# Patient Record
Sex: MALE | Race: WHITE | Employment: OTHER | URBAN - METROPOLITAN AREA
[De-identification: names, ages, dates, MRNs, and addresses within clinical notes are randomized per-mention and may not be internally consistent; named-entity substitution may affect disease eponyms.]

---

## 2017-01-05 DIAGNOSIS — F03.90 DEMENTIA WITHOUT BEHAVIORAL DISTURBANCE, UNSPECIFIED DEMENTIA TYPE: Primary | ICD-10-CM

## 2017-01-05 RX ORDER — DONEPEZIL HYDROCHLORIDE 5 MG/1
5 TABLET, FILM COATED ORAL AT BEDTIME
Qty: 90 TABLET | Refills: 0 | Status: SHIPPED | OUTPATIENT
Start: 2017-01-05 | End: 2017-05-02

## 2017-01-05 NOTE — TELEPHONE ENCOUNTER
Fax from pharmacy.  They have Rx for donepezil given 12/26/16 #0 with 1 refill.  They are wondering if you can resend as a 90 day supply?  I started reorder.

## 2017-01-08 PROBLEM — F03.90 DEMENTIA WITHOUT BEHAVIORAL DISTURBANCE, UNSPECIFIED DEMENTIA TYPE: Status: ACTIVE | Noted: 2017-01-08

## 2017-01-27 ENCOUNTER — TELEPHONE (OUTPATIENT)
Dept: FAMILY MEDICINE | Facility: OTHER | Age: 72
End: 2017-01-27

## 2017-01-27 NOTE — TELEPHONE ENCOUNTER
Duane Tegg is a 71 year old male who-   NURSING ASSESSMENT:  Description:  Daughter-in-law calls. Consent on file to speak with her. She is concerned because her dad's Bp this am was 101/62.  His bp medication was increased this past Dec. She is not with him as he is in [place] at this time. She is also concerned because he has been having increased memory confusion. She does not believe that he is taking extra of his medications because a neighbor is checking on him and setting up his medication. She is unable to answer questions regarding associated symptoms at this time.  Advised daughter-in-law to have him get a bp machine at home to start checking bp's daily and logging them. If his SBP drops below 90 or he has symptoms of lightheadedness, weakness, faint then he should seek emergency care. Otherwise he should find a provider locally to see in the mean time for bp follow up since he is not due back to [place] until April or May. She will contact patient to see how he is doing.   Precip. factors: unknown   Associated symptoms:  unknown  Pain scale (0-10)  Unable to rate  Last exam/Treatment:  12/26/2016  Allergies: No Known Allergies  NURSING PLAN: Nursing advice to patient See above     RECOMMENDED DISPOSITION:  Home care advice   Will comply with recommendation: Yes  If further questions/concerns or if symptoms do not improve, worsen or new symptoms develop, call your PCP or [facility] as soon as possible.      Guideline used: [employer]  Telephone Triage Protocols for Nurses, Fourth Edition, Myara Miranda RN

## 2017-01-27 NOTE — TELEPHONE ENCOUNTER
Reason for call:  Patient reporting a symptom     Symptom or request: Blood pressure 101 over 62 about 2 days ago, Daughter thinks that his blood pressure is to low & he does have a cold right now.    Duration (how long have symptoms been present): N/A    Have you been treated for this before? Yes    Additional comments: Daughter wants to know if her dads bp is ok or if she should be concerned. Also she stated that the Dr increased in his dosage on medication for BP.    Phone Number patient can be reached at:  Other phone number: Daughters phone#  [phone]    Best Time:  Anytime    Can we leave a detailed message on this number:  YES    Call taken on 1/27/2017 at 4:32 PM by Leidy Dias

## 2017-01-31 ENCOUNTER — TELEPHONE (OUTPATIENT)
Dept: FAMILY MEDICINE | Facility: OTHER | Age: 72
End: 2017-01-31

## 2017-01-31 DIAGNOSIS — I10 ESSENTIAL HYPERTENSION: Primary | ICD-10-CM

## 2017-01-31 NOTE — TELEPHONE ENCOUNTER
bp machine was order in 2015 but never picked up. On the AVS it looked like a new Rx which was confusing but one has not been ordered since 2015. Since pt never picked it up, could you order a new one to the [facility] [address] [place], [place] - [address] AT [address] OF [place] [address]  Since he is in [place] until end of March at least. With the new changes in medications he and his family would like him to check it daily at home instead of driving somewhere to check it as he is starting to have memory problems    Routing to  for review and order if appropriate.

## 2017-01-31 NOTE — TELEPHONE ENCOUNTER
Reason for call:  Other  Reason for Call: Request for an order or referral:    Order or referral being requested: blood pressure machine    Date needed: as soon as possible    Has the patient been seen by the PCP for this problem? YES    Additional comments: is wondering where this was sent to?     Phone number Patient can be reached at:  Other phone number:  Fatimah ovalle [phone]    Best Time:  any    Can we leave a detailed message on this number?  YES    Call taken on 1/31/2017 at 3:24 PM by Ladan Kim

## 2017-02-09 ENCOUNTER — TRANSFERRED RECORDS (OUTPATIENT)
Dept: HEALTH INFORMATION MANAGEMENT | Facility: CLINIC | Age: 72
End: 2017-02-09

## 2017-02-17 ENCOUNTER — TELEPHONE (OUTPATIENT)
Dept: FAMILY MEDICINE | Facility: OTHER | Age: 72
End: 2017-02-17

## 2017-02-17 NOTE — TELEPHONE ENCOUNTER
Summary:    Patient is due/failing the following:   BP CHECK    Action needed:   Patient needs nurse only appointment.    Type of outreach:    Phone, spoke to patient.  patient is now in [place] and doesnt know when or if he is coming back     Questions for provider review:    None                                                                                                                                    Sylwia Christianson       Chart routed to Care Team .        Panel Management Review      Patient has the following on his problem list:     Hypertension   Last three blood pressure readings:  BP Readings from Last 3 Encounters:   12/26/16 (!) 154/92   01/06/16 (!) 140/94   12/24/15 146/80     Blood pressure: Failed    HTN Guidelines:  Age 18-59 BP range:  Less than 140/90  Age 60-85 with Diabetes:  Less than 140/90  Age 60-85 without Diabetes:  less than 150/90      Composite cancer screening  Chart review shows that this patient is due/due soon for the following None

## 2017-03-10 ENCOUNTER — TRANSFERRED RECORDS (OUTPATIENT)
Dept: HEALTH INFORMATION MANAGEMENT | Facility: CLINIC | Age: 72
End: 2017-03-10

## 2017-03-10 LAB
ALT SERPL-CCNC: 29 U/L (ref 5–49)
AST SERPL-CCNC: 24 U/L (ref 9–48)
CHOLEST SERPL-MCNC: 131 MG/DL
CREAT SERPL-MCNC: 0.9 MG/DL (ref 0.4–1.4)
GFR SERPL CREATININE-BSD FRML MDRD: >60 ML/MIN/1.73M2
GLUCOSE SERPL-MCNC: 94 MG/DL (ref 65–100)
HDLC SERPL-MCNC: 40 MG/DL (ref 40–50)
HEP C HIM: NORMAL
POTASSIUM SERPL-SCNC: 4.6 MMOL/L (ref 3.6–5.3)
TRIGL SERPL-MCNC: 130 MG/DL (ref 35–150)

## 2017-05-02 DIAGNOSIS — F03.90 DEMENTIA WITHOUT BEHAVIORAL DISTURBANCE, UNSPECIFIED DEMENTIA TYPE: ICD-10-CM

## 2017-05-05 RX ORDER — DONEPEZIL HYDROCHLORIDE 5 MG/1
TABLET, FILM COATED ORAL
Qty: 90 TABLET | Refills: 0 | Status: SHIPPED | OUTPATIENT
Start: 2017-05-05 | End: 2017-10-02

## 2017-05-05 NOTE — TELEPHONE ENCOUNTER
Prescription approved per [facility] Refill Protocol.  Patient due for OV in June.    Destiny Osborn, RN, BSN

## 2017-05-08 ENCOUNTER — TRANSFERRED RECORDS (OUTPATIENT)
Dept: HEALTH INFORMATION MANAGEMENT | Facility: CLINIC | Age: 72
End: 2017-05-08

## 2017-09-20 NOTE — PROGRESS NOTES
SUBJECTIVE:                                                    Duane Tegg is a 72 year old male who presents to clinic today for the following health issues:      HPI    Back Pain       Duration: x few years        Specific cause: none    Description:   Location of pain: low back bilateral  Character of pain: stabbing and intermittent  Pain radiation:radiates into the left buttocks  New numbness or weakness in legs, not attributed to pain:  no     Intensity: At its worst 8/10    History:   Pain interferes with job: Not applicable  History of back problems: no prior back problems  Any previous MRI or X-rays: None  Sees a specialist for back pain:  No  Therapies tried without relief: Steroid and Physical Therapy    Alleviating factors:   Improved by: Steroid, only for a bit      Precipitating factors:  Worsened by: Standing and Sitting    Functional and Psychosocial Screen (Melody STarT Back):      -          Accompanying Signs & Symptoms:  Risk of Fracture:  None  Risk of Cauda Equina:  None  Risk of Infection:  None  Risk of Cancer:  None  Risk of Ankylosing Spondylitis:  Onset at age <35, male, AND morning back stiffness. no           Problem list and histories reviewed & adjusted, as indicated.  Additional history: as documented        Patient Active Problem List   Diagnosis     Right bundle branch block     Essential hypertension     Mild cognitive impairment     Dementia without behavioral disturbance, unspecified dementia type     Past Surgical History:   Procedure Laterality Date     COLONOSCOPY N/A 1/6/2016    Procedure: COMBINED COLONOSCOPY, SINGLE OR MULTIPLE BIOPSY/POLYPECTOMY BY BIOPSY;  Surgeon: Ryan Nicholas MD;  Location:  GI       Social History   Substance Use Topics     Smoking status: Former Smoker     Smokeless tobacco: Not on file     Alcohol use Yes     Family History   Problem Relation Age of Onset     DIABETES Father          Current Outpatient Prescriptions   Medication Sig Dispense  "Refill     predniSONE (DELTASONE) 20 MG tablet Take 2 tablets (40 mg) by mouth daily for 5 days 10 tablet 0     losartan (COZAAR) 100 MG tablet TAKE 1 TABLET(100 MG) BY MOUTH DAILY 90 tablet 0     donepezil (ARICEPT) 5 MG tablet TAKE 1 TABLET(5 MG) BY MOUTH AT BEDTIME 90 tablet 0     atorvastatin (LIPITOR) 20 MG tablet Take 1 tablet (20 mg) by mouth daily 90 tablet 3     aspirin 81 MG tablet Take 1 tablet (81 mg) by mouth daily 90 tablet 3     order for DME Equipment being ordered: blood pressure machine and cuff (Patient not taking: Reported on 9/21/2017) 1 each 0     No Known Allergies  BP Readings from Last 3 Encounters:   09/21/17 112/74   12/26/16 (!) 154/92   01/06/16 (!) 140/94    Wt Readings from Last 3 Encounters:   09/21/17 197 lb (89.4 kg)   12/26/16 204 lb (92.5 kg)   12/24/15 204 lb (92.5 kg)                  Labs reviewed in EPIC        ROS:  Constitutional, HEENT, cardiovascular, pulmonary, GI, , musculoskeletal, neuro, skin, endocrine and psych systems are negative, except as otherwise noted.      OBJECTIVE:   /74  Pulse 84  Temp 97.6  F (36.4  C) (Oral)  Resp 16  Ht 5' 7.25\" (1.708 m)  Wt 197 lb (89.4 kg)  BMI 30.63 kg/m2  Body mass index is 30.63 kg/(m^2).   Physical Exam   Constitutional: He is oriented to person, place, and time. He appears well-developed and well-nourished.   HENT:   Head: Normocephalic and atraumatic.   Musculoskeletal:   Milldy reduced strength on the left compared to the right. Normal reflexes, sensations. Positive straight leg rise   Neurological: He is alert and oriented to person, place, and time.   Psychiatric: He has a normal mood and affect.         Diagnostic Test Results:  none     ASSESSMENT/PLAN:     Problem List Items Addressed This Visit     None      Visit Diagnoses     Lumbar radiculopathy    -  Primary    Relevant Medications    predniSONE (DELTASONE) 20 MG tablet    Other Relevant Orders    XR Lumbar Spine 2/3 Views    [facility] PT, HAND, AND " CHIROPRACTIC REFERRAL    Chronic left-sided low back pain with left-sided sciatica        Relevant Medications    predniSONE (DELTASONE) 20 MG tablet    Other Relevant Orders    [facility] PT, HAND, AND CHIROPRACTIC REFERRAL         Symptoms consistent with radiculopathy   Trial prednisone  Get baseline X-ray   Trial therapy   Follow results of X-ray closely  If no response to conservative management - will get MRI for further eval  Discussed home care  Reportable signs and symptoms discussed  RTC if symptoms persist or fail to improve    Heike Antonio MD  [facility]

## 2017-09-21 ENCOUNTER — OFFICE VISIT (OUTPATIENT)
Dept: FAMILY MEDICINE | Facility: OTHER | Age: 72
End: 2017-09-21
Payer: COMMERCIAL

## 2017-09-21 ENCOUNTER — RADIANT APPOINTMENT (OUTPATIENT)
Dept: GENERAL RADIOLOGY | Facility: OTHER | Age: 72
End: 2017-09-21
Attending: FAMILY MEDICINE
Payer: COMMERCIAL

## 2017-09-21 VITALS
HEART RATE: 84 BPM | RESPIRATION RATE: 16 BRPM | WEIGHT: 197 LBS | SYSTOLIC BLOOD PRESSURE: 112 MMHG | HEIGHT: 67 IN | DIASTOLIC BLOOD PRESSURE: 74 MMHG | BODY MASS INDEX: 30.92 KG/M2 | TEMPERATURE: 97.6 F

## 2017-09-21 DIAGNOSIS — Z23 NEED FOR PROPHYLACTIC VACCINATION AND INOCULATION AGAINST INFLUENZA: ICD-10-CM

## 2017-09-21 DIAGNOSIS — G89.29 CHRONIC LEFT-SIDED LOW BACK PAIN WITH LEFT-SIDED SCIATICA: ICD-10-CM

## 2017-09-21 DIAGNOSIS — M54.42 CHRONIC LEFT-SIDED LOW BACK PAIN WITH LEFT-SIDED SCIATICA: ICD-10-CM

## 2017-09-21 DIAGNOSIS — M54.16 LUMBAR RADICULOPATHY: Primary | ICD-10-CM

## 2017-09-21 DIAGNOSIS — M54.16 LUMBAR RADICULOPATHY: ICD-10-CM

## 2017-09-21 PROCEDURE — 90662 IIV NO PRSV INCREASED AG IM: CPT | Performed by: FAMILY MEDICINE

## 2017-09-21 PROCEDURE — 72100 X-RAY EXAM L-S SPINE 2/3 VWS: CPT

## 2017-09-21 PROCEDURE — 99214 OFFICE O/P EST MOD 30 MIN: CPT | Mod: 25 | Performed by: FAMILY MEDICINE

## 2017-09-21 PROCEDURE — G0008 ADMIN INFLUENZA VIRUS VAC: HCPCS | Performed by: FAMILY MEDICINE

## 2017-09-21 RX ORDER — PREDNISONE 20 MG/1
40 TABLET ORAL DAILY
Qty: 10 TABLET | Refills: 0 | Status: SHIPPED | OUTPATIENT
Start: 2017-09-21 | End: 2017-09-26

## 2017-09-21 ASSESSMENT — PAIN SCALES - GENERAL: PAINLEVEL: NO PAIN (0)

## 2017-09-21 NOTE — PROGRESS NOTES
Injectable Influenza Immunization Documentation    1.  Is the person to be vaccinated sick today?   No    2. Does the person to be vaccinated have an allergy to a component   of the vaccine?   No    3. Has the person to be vaccinated ever had a serious reaction   to influenza vaccine in the past?   No    4. Has the person to be vaccinated ever had Guillain-Barré syndrome?   No    Form completed by Leigh Tillman MA

## 2017-09-21 NOTE — MR AVS SNAPSHOT
After Visit Summary   9/21/2017    Duane Tegg    MRN: [id number]           Patient Information     Date Of Birth          1945        Visit Information        Provider Department      9/21/2017 5:40 PM Heike Antonio MD [facility]        Today's Diagnoses     Lumbar radiculopathy    -  1    Chronic left-sided low back pain with left-sided sciatica           Follow-ups after your visit        Additional Services     SH[facility] PT, HAND, AND CHIROPRACTIC REFERRAL       **This order will print in the [facility] Scheduling Office**    Physical Therapy, Hand Therapy and Chiropractic Care are available through:    *[facility]  *[facility]  *[facility]    Call one number to schedule at any of the above locations: [phone].    Your provider has referred you to: Physical Therapy at [facility] or [facility]    Indication/Reason for Referral: Low Back Pain  Onset of Illness:   Therapy Orders: Evaluate and Treat  Special Programs: None  Special Request: Manual Therapy: Myofascial Release/Massage  None    Melody Oliver      Additional Comments for the Therapist or Chiropractor: .    Please be aware that coverage of these services is subject to the terms and limitations of your health insurance plan.  Call member services at your health plan with any benefit or coverage questions.      Please bring the following to your appointment:    *Your personal calendar for scheduling future appointments  *Comfortable clothing                  Follow-up notes from your care team     Return in about 4 weeks (around 10/19/2017).      Future tests that were ordered for you today     Open Future Orders        Priority Expected Expires Ordered    XR Lumbar Spine 2/3 Views Routine 9/21/2017 9/21/2018 9/21/2017            Who to contact     If you have questions or need follow up information about today's clinic visit or your schedule please contact [facility] directly at [phone].  Normal or non-critical lab and imaging results will be communicated to you by TigerTradehart, letter or phone within 4 business days after the clinic has received the results. If you do not hear from us within 7 days, please contact the clinic through [employer]Tradehart or phone. If you have a critical or abnormal lab result, we will notify you by phone as soon as possible.  Submit refill requests through [employer] or call your pharmacy and they will forward the refill request to us. Please allow 3 business days for your refill to be completed.          Additional Information About Your Visit        TigerTradeharSnappyTV Information     [employer] gives you secure access to your electronic health record. If you see a primary care provider, you can also send messages to your care team and make appointments. If you have questions, please call your primary care clinic.  If you do not have a primary care provider, please call [phone] and they will assist you.        Care EveryWhere ID     This is your Care EveryWhere ID. This could be used by other organizations to access your [facility] medical records  [id number]        Your Vitals Were     Pulse Temperature Respirations Height BMI (Body Mass Index)       84 97.6  F (36.4  C) (Oral) 16 5' 7.25\" (1.708 m) 30.63 kg/m2        Blood Pressure from Last 3 Encounters:   09/21/17 112/74   12/26/16 (!) 154/92   01/06/16 (!) 140/94    Weight from Last 3 Encounters:   09/21/17 197 lb (89.4 kg)   12/26/16 204 lb (92.5 kg)   12/24/15 204 lb (92.5 kg)              We Performed the Following     [facility] PT, HAND, AND CHIROPRACTIC REFERRAL          Today's Medication Changes          These changes are accurate as of: 9/21/17  6:36 PM.  If you have any questions, ask your nurse or doctor.               Start taking these medicines.        Dose/Directions    predniSONE 20 MG tablet   Commonly known as:  DELTASONE   Used for:  Lumbar radiculopathy   Started by:  Heike Antonio MD        " Dose:  40 mg   Take 2 tablets (40 mg) by mouth daily for 5 days   Quantity:  10 tablet   Refills:  0            Where to get your medicines      These medications were sent to [facility] - [place], [place] - [address] [facility] AT [facility] [address]  [address], [facility] [address]     Phone:  [phone]     predniSONE 20 MG tablet                Primary Care Provider    Physician No Ref-Primary       No address on file        Equal Access to Services     MARCELLA SY AH: Hadii aad ku hadasho Soomaali, waaxda luqadaha, qaybta kaalmada adeegyada, waxay idiin hayaan nader grace ladanielle moise. So [facility] [phone].    ATENCIÓN: Si habla español, tiene a jorge disposición servicios gratuitos de asistencia lingüística. [facility] [phone].    We comply with applicable federal civil rights laws and [place] laws. We do not discriminate on the basis of race, color, national origin, age, disability sex, sexual orientation or gender identity.            Thank you!     Thank you for choosing [facility]  for your care. Our goal is always to provide you with excellent care. Hearing back from our patients is one way we can continue to improve our services. Please take a few minutes to complete the written survey that you may receive in the mail after your visit with us. Thank you!             Your Updated Medication List - Protect others around you: Learn how to safely use, store and throw away your medicines at www.disposemymeds.org.          This list is accurate as of: 9/21/17  6:36 PM.  Always use your most recent med list.                   Brand Name Dispense Instructions for use Diagnosis    aspirin 81 MG tablet     90 tablet    Take 1 tablet (81 mg) by mouth daily    Benign essential hypertension       atorvastatin 20 MG tablet    LIPITOR    90 tablet    Take 1 tablet (20 mg) by mouth daily    Hyperlipidemia LDL goal <100       donepezil 5 MG tablet    ARIcept    90 tablet    TAKE  1 TABLET(5 MG) BY MOUTH AT BEDTIME    Dementia without behavioral disturbance, unspecified dementia type       losartan 100 MG tablet    COZAAR    90 tablet    TAKE 1 TABLET(100 MG) BY MOUTH DAILY    Benign essential hypertension       order for DME     1 each    Equipment being ordered: blood pressure machine and cuff    Essential hypertension       predniSONE 20 MG tablet    DELTASONE    10 tablet    Take 2 tablets (40 mg) by mouth daily for 5 days    Lumbar radiculopathy

## 2017-09-21 NOTE — NURSING NOTE
"Chief Complaint   Patient presents with     Back Pain     Panel Management     height, flu, tdap, honoring choices        Initial /74  Pulse 84  Temp 97.6  F (36.4  C) (Oral)  Resp 16  Ht 5' 7.25\" (1.708 m)  Wt 197 lb (89.4 kg)  BMI 30.63 kg/m2 Estimated body mass index is 30.63 kg/(m^2) as calculated from the following:    Height as of this encounter: 5' 7.25\" (1.708 m).    Weight as of this encounter: 197 lb (89.4 kg).  Medication Reconciliation: complete  \Leigh Tillman MA    "

## 2017-09-25 ENCOUNTER — TELEPHONE (OUTPATIENT)
Dept: FAMILY MEDICINE | Facility: OTHER | Age: 72
End: 2017-09-25

## 2017-09-25 NOTE — TELEPHONE ENCOUNTER
----- Message from Heike Antonio MD sent at 9/22/2017  5:03 PM CDT -----  Please inform pt that the X-ray of the back showed severe arthritis and degenerative changes which could be causing the pain. I would recommend therapy for 4 wks. If no improvement , I will get MRI for further eval

## 2017-09-25 NOTE — TELEPHONE ENCOUNTER
Contacted pt and let him know of below.  Will follow up with PT and MRI if not improving.  Bel Lyle, CMA

## 2017-09-25 NOTE — TELEPHONE ENCOUNTER
Spoke to patient and informed him of message below. He is scheduled for PT tomorrow and will let us know after 4 weeks how he is doing.  He said that the prednisone seems to be helping and say's it's only for 5 days. Wondering if he could get a refill on that. I told him that I would check with you.    prednisone      Last Written Prescription Date:  09/21/17  Last Fill Quantity: 10,   # refills: 0  Last Office Visit with [facility] or [facility] prescribing provider: 09/21/17  Future Office visit:       Routing refill request to provider for review/approval because:  Drug not on the [facility], [facility] or [facility] refill protocol or controlled substance

## 2017-09-26 ENCOUNTER — THERAPY VISIT (OUTPATIENT)
Dept: PHYSICAL THERAPY | Facility: CLINIC | Age: 72
End: 2017-09-26
Payer: MEDICARE

## 2017-09-26 DIAGNOSIS — G89.29 CHRONIC BILATERAL LOW BACK PAIN WITH LEFT-SIDED SCIATICA: Primary | Chronic | ICD-10-CM

## 2017-09-26 DIAGNOSIS — M53.3 SI (SACROILIAC) JOINT DYSFUNCTION: ICD-10-CM

## 2017-09-26 DIAGNOSIS — M54.42 CHRONIC BILATERAL LOW BACK PAIN WITH LEFT-SIDED SCIATICA: Primary | Chronic | ICD-10-CM

## 2017-09-26 PROCEDURE — 97161 PT EVAL LOW COMPLEX 20 MIN: CPT | Mod: GP | Performed by: PHYSICAL THERAPIST

## 2017-09-26 PROCEDURE — 97140 MANUAL THERAPY 1/> REGIONS: CPT | Mod: GP | Performed by: PHYSICAL THERAPIST

## 2017-09-26 PROCEDURE — G8979 MOBILITY GOAL STATUS: HCPCS | Mod: GP | Performed by: PHYSICAL THERAPIST

## 2017-09-26 PROCEDURE — G8978 MOBILITY CURRENT STATUS: HCPCS | Mod: GP | Performed by: PHYSICAL THERAPIST

## 2017-09-26 NOTE — PROGRESS NOTES
[facility] Initial Evaluation      Subjective:    Patient is a 72 year old male presenting with rehab back hpi. The history is provided by the patient. No  was used.   Duane Tegg is a 72 year old male with a lumbar condition.  Condition occurred with:  Insidious onset.  Condition occurred: for unknown reasons.  This is a chronic condition  Pt presets to PT today with primary complaint of low back pain, states he is much improved at this time as he has started taking prednisone. Pt reports has had the pain for 2 years in duration, chronic. Has some weakness in the LLE as well. Insidious in onset. Pain is rated at 7/10 and is intermittent, and brief, states gets sharp stabs of pain. Pain usually with transitional activity, following sitting too long. He reports prior numbness in the LLE, better since on the steroids. Pt followed up with physician on 9-21-17 and was referred to PT for evaluation and treatment. .    Patient reports pain:  Lower lumbar spine.  Radiates to:  Gluteals left, thigh left, knee left, lower leg left and foot left.  Pain is described as shooting and stabbing and is intermittent and reported as 6/10.  Associated symptoms:  Loss of motion/stiffness, loss of strength, numbness and tingling. Pain is the same all the time (depends on activity).  Symptoms are exacerbated by sitting (transitonal activities, riding long term in car ) and relieved by nothing (Rubbing leg).  Since onset symptoms are gradually improving (with use of the prednisone).  Special tests:  X-ray.  Previous treatment includes other (Steroids).  There was significant improvement following previous treatment.  General health as reported by patient is good.  Pertinent medical history includes:  High blood pressure and osteoarthritis.  Medical allergies: no.    Current medications:  High blood pressure medication.  Current occupation is Retired .        Barriers include:  None as reported by  the patient.    Red flags:  None as reported by the patient.                        Objective:    System         Lumbar/SI Evaluation  ROM:    AROM Lumbar:   Flexion:          75%, no change   Ext:                    50%, pulling anterior    Side Bend:        Left:  20 deg, no pain     Right:  20 deg, no pain   Rotation:           Left:     Right:   Side Glide:        Left:     Right:           Lumbar Myotomes:  normal            Lumbar DTR's:  not assessed        Lumbar Dermtomes:  normal                Neural Tension/Mobility:      Left side:Slump  negative.     Right side:   Slump  negative.   Lumbar Palpation:    Tenderness present at Left:    Quadratus Lumborum  Tenderness not present at Left:    Erector Spinae; Piriformis; PSIS; ASIS; Iliac Crest; Gluteus Medius; Greater Trochanter; Hamstrings; Hip Flexors or Vertebral  Tenderness present at Right: Quadratus Lumborum  Tenderness not present at Right:  Erector Spinae; Piriformis; PSIS; ASIS; Iliac Crest; Gluteus Medius; Greater Trochanter; Hamstrings; Hip flexors or Vertebral      Spinal Segmental Conclusions:     Level: Hypo noted at L1, L2, L3, L4 and L5      SI joint/Sacrum:    Low Riliac crest, low R PSIS, (+) standing flexion L, (+) giletts test L  Side, high L ASIS. Sacral flexion test (+) for L on R torsion         Sacral conclusion left:  Sacral torsion                                               General     ROS    Assessment/Plan:      Patient is a 72 year old male with lumbar and sacral complaints.    Patient has the following significant findings with corresponding treatment plan.                Diagnosis 1:  Low back pain, SI joint dysfunction   Pain -  US, electric stimulation, manual therapy, self management, education, directional preference exercise and home program  Decreased ROM/flexibility - manual therapy, therapeutic exercise and home program  Decreased joint mobility - manual therapy, therapeutic exercise and home program  Inflammation -  US, electric stimulation and self management/home program  Impaired muscle performance - neuro re-education and home program  Decreased function - therapeutic activities and home program    Therapy Evaluation Codes:   1) History comprised of:   Personal factors that impact the plan of care:      None.    Comorbidity factors that impact the plan of care are:      Osteoarthritis.     Medications impacting care: Steroids.  2) Examination of Body Systems comprised of:   Body structures and functions that impact the plan of care:      Hip, Knee, Lumbar spine and Sacral illiac joint.   Activity limitations that impact the plan of care are:      Sitting, Walking and transitional activities .  3) Clinical presentation characteristics are:   Stable/Uncomplicated.  4) Decision-Making    Low complexity using standardized patient assessment instrument and/or measureable assessment of functional outcome.  Cumulative Therapy Evaluation is: Low complexity.    Previous and current functional limitations:  (See Goal Flow Sheet for this information)    Short term and Long term goals: (See Goal Flow Sheet for this information)     Communication ability:  Patient appears to be able to clearly communicate and understand verbal and written communication and follow directions correctly.  Treatment Explanation - The following has been discussed with the patient:   RX ordered/plan of care  Anticipated outcomes  Possible risks and side effects  This patient would benefit from PT intervention to resume normal activities.   Rehab potential is good.    Frequency:  1 X week, once daily  Duration:  for 8 weeks  Discharge Plan:  Achieve all LTG.  Independent in home treatment program.  Reach maximal therapeutic benefit.    Please refer to the daily flowsheet for treatment today, total treatment time and time spent performing 1:1 timed codes.

## 2017-09-26 NOTE — LETTER
DEPARTMENT OF HEALTH AND HUMAN SERVICES  CENTERS FOR MEDICARE & MEDICAID SERVICES    PLAN/UPDATED PLAN OF PROGRESS FOR OUTPATIENT REHABILITATION    PATIENTS NAME:  Tegg, Duane     : 1945    PROVIDER NUMBER:    [id number]    [id number]    PROVIDER NAME: [facility]    MEDICAL RECORD NUMBER: [id number]     START OF CARE DATE:  SOC Date: 17   TYPE:  PT    PRIMARY/TREATMENT DIAGNOSIS: (Pertinent Medical Diagnosis)     Chronic bilateral low back pain with left-sided sciatica  SI (sacroiliac) joint dysfunction    VISITS FROM START OF CARE:  Rxs Used: 1     [facility] Initial Evaluation    Subjective:    Patient is a 72 year old male presenting with rehab back hpi. The history is provided by the patient. No  was used.   Duane Tegg is a 72 year old male with a lumbar condition.  Condition occurred with:  Insidious onset.  Condition occurred: for unknown reasons.  This is a chronic condition  Pt presets to PT today with primary complaint of low back pain, states he is much improved at this time as he has started taking prednisone. Pt reports has had the pain for 2 years in duration, chronic. Has some weakness in the LLE as well. Insidious in onset. Pain is rated at 7/10 and is intermittent, and brief, states gets sharp stabs of pain. Pain usually with transitional activity, following sitting too long. He reports prior numbness in the LLE, better since on the steroids. Pt followed up with physician on 17 and was referred to PT for evaluation and treatment. .    Patient reports pain:  Lower lumbar spine.  Radiates to:  Gluteals left, thigh left, knee left, lower leg left and foot left.  Pain is described as shooting and stabbing and is intermittent and reported as 6/10.  Associated symptoms:  Loss of motion/stiffness, loss of strength, numbness and tingling. Pain is the same all the time (depends on activity).   Symptoms are exacerbated by sitting (transitonal activities, riding long term in car ) and relieved by nothing (Rubbing leg).  Since onset symptoms are gradually improving (with use of the prednisone).  Special tests:  X-ray.  Previous treatment includes other (Steroids).  There was significant improvement following previous treatment.  General health as reported by patient is good.  Pertinent medical history includes:  High blood pressure and osteoarthritis.  Medical allergies: no.    Current medications:  High blood pressure medication.  Current occupation is Retired .        Barriers include:  None as reported by the patient.  Red flags:  None as reported by the patient.    Objective:       Lumbar/SI Evaluation  ROM:    AROM Lumbar:   Flexion:          75%, no change   Ext:                    50%, pulling anterior    Side Bend:        Left:  20 deg, no pain     Right:  20 deg, no pain   Rotation:           Left:     Right:   Side Glide:        Left:     Right:         Lumbar Myotomes:  normal    Lumbar DTR's:  not assessed    Lumbar Dermtomes:  normal    Neural Tension/Mobility:    Left side:Slump  negative.   Right side:   Slump  negative.     Lumbar Palpation:    Tenderness present at Left:    Quadratus Lumborum  Tenderness not present at Left:    Erector Spinae; Piriformis; PSIS; ASIS; Iliac Crest; Gluteus Medius; Greater Trochanter; Hamstrings; Hip Flexors or Vertebral  Tenderness present at Right: Quadratus Lumborum  Tenderness not present at Right:  Erector Spinae; Piriformis; PSIS; ASIS; Iliac Crest; Gluteus Medius; Greater Trochanter; Hamstrings; Hip flexors or Vertebral    Spinal Segmental Conclusions:   Level: Hypo noted at L1, L2, L3, L4 and L5    SI joint/Sacrum:    Low Riliac crest, low R PSIS, (+) standing flexion L, (+) giletts test L  Side, high L ASIS. Sacral flexion test (+) for L on R torsion   Sacral conclusion left:  Sacral torsion    Assessment/Plan:    Patient is a 72 year old male with  lumbar and sacral complaints.    Patient has the following significant findings with corresponding treatment plan.                Diagnosis 1:  Low back pain, SI joint dysfunction   Pain -  US, electric stimulation, manual therapy, self management, education, directional preference exercise and home program  Decreased ROM/flexibility - manual therapy, therapeutic exercise and home program  Decreased joint mobility - manual therapy, therapeutic exercise and home program  Inflammation - US, electric stimulation and self management/home program  Impaired muscle performance - neuro re-education and home program  Decreased function - therapeutic activities and home program    Therapy Evaluation Codes:   1) History comprised of:   Personal factors that impact the plan of care:      None.    Comorbidity factors that impact the plan of care are:      Osteoarthritis.     Medications impacting care: Steroids.  2) Examination of Body Systems comprised of:   Body structures and functions that impact the plan of care:      Hip, Knee, Lumbar spine and Sacral illiac joint.   Activity limitations that impact the plan of care are:      Sitting, Walking and transitional activities .  3) Clinical presentation characteristics are:   Stable/Uncomplicated.  4) Decision-Making    Low complexity using standardized patient assessment instrument and/or measureable assessment of functional outcome.  Cumulative Therapy Evaluation is: Low complexity.    Previous and current functional limitations:  (See Goal Flow Sheet for this information)    Short term and Long term goals: (See Goal Flow Sheet for this information)     Communication ability:  Patient appears to be able to clearly communicate and understand verbal and written communication and follow directions correctly.  Treatment Explanation - The following has been discussed with the patient:   RX ordered/plan of care  Anticipated outcomes  Possible risks and side effects  This patient  "would benefit from PT intervention to resume normal activities.   Rehab potential is good.    Frequency:  1 X week, once daily  Duration:  for 8 weeks  Discharge Plan:  Achieve all LTG.  Independent in home treatment program.  Reach maximal therapeutic benefit.      Caregiver Signature/Credentials _____________________________ Date ________       Treating Provider: Cruz Fish PT   I have reviewed and certified the need for these services and plan of treatment while under my care.        PHYSICIAN'S SIGNATURE:   _________________________________________  Date___________   Heike Antonio MD      Certification period:  Beginning of Cert date period: 09/26/17 to  End of Cert period date: 12/24/17     Functional Level Progress Report: Please see attached \"Goal Flow sheet for Functional level.\"    ____X____ Continue Services or       ________ DC Services                Service dates: From  SOC Date: 09/26/17 date to present                         "

## 2017-09-26 NOTE — MR AVS SNAPSHOT
After Visit Summary   9/26/2017    Duane Tegg    MRN: [id number]           Patient Information     Date Of Birth          1945        Visit Information        Provider Department      9/26/2017 12:10 PM Cruz Fish, PT [facility] Physical Therapy        Today's Diagnoses     Chronic bilateral low back pain with left-sided sciatica    -  1    SI (sacroiliac) joint dysfunction           Follow-ups after your visit        Your next 10 appointments already scheduled     Oct 03, 2017 12:10 PM CDT   SHRAVAN Spine with Cruz Fish PT   [facility] ([facility]  )    [address]  [place] [id number] [phone]            Oct 10, 2017 12:10 PM CDT   SHRAVAN Spine with Cruz Fish PT   [facility] ([facility]  )    [address]  [facility] [id number] [phone]              Who to contact     If you have questions or need follow up information about today's clinic visit or your schedule please contact [facility] [place] [facility] directly at [phone].  Normal or non-critical lab and imaging results will be communicated to you by Proxima Cancionhart, letter or phone within 4 business days after the clinic has received the results. If you do not hear from us within 7 days, please contact the clinic through Proxima Cancionhart or phone. If you have a critical or abnormal lab result, we will notify you by phone as soon as possible.  Submit refill requests through Tarena or call your pharmacy and they will forward the refill request to us. Please allow 3 business days for your refill to be completed.          Additional Information About Your Visit        Proxima Cancionhart Information     Tarena gives you secure access to your electronic health record. If you see a primary care provider, you can also send messages to your care team and  make appointments. If you have questions, please call your primary care clinic.  If you do not have a primary care provider, please call [phone] and they will assist you.        Care EveryWhere ID     This is your Care EveryWhere ID. This could be used by other organizations to access your [place] medical records  [id number]         Blood Pressure from Last 3 Encounters:   09/21/17 112/74   12/26/16 (!) 154/92   01/06/16 (!) 140/94    Weight from Last 3 Encounters:   09/21/17 89.4 kg (197 lb)   12/26/16 92.5 kg (204 lb)   12/24/15 92.5 kg (204 lb)              We Performed the Following     HC PT EVAL, LOW COMPLEXITY     SHRAVAN CERT REPORT     SHRAVAN INITIAL EVAL REPORT     MANUAL THER TECH,1+REGIONS,EA 15 MIN        Primary Care Provider    Physician No Ref-Primary       No address on file        Equal Access to Services     MARCELLA SY : Hadii telma evanso Sostephen, waaxda luqadaha, qaybta kaalmada adelorieyada, jr taveras . So [facility] [phone].    ATENCIÓN: Si habla español, tiene a jorge disposición servicios gratuitos de asistencia lingüística. Arminame al [phone].    We comply with applicable federal civil rights laws and [place] laws. We do not discriminate on the basis of race, color, national origin, age, disability sex, sexual orientation or gender identity.            Thank you!     Thank you for choosing [facility]ER[facility]  for your care. Our goal is always to provide you with excellent care. Hearing back from our patients is one way we can continue to improve our services. Please take a few minutes to complete the written survey that you may receive in the mail after your visit with us. Thank you!             Your Updated Medication List - Protect others around you: Learn how to safely use, store and throw away your medicines at www.disposemymeds.org.          This list is accurate as of: 9/26/17 12:49 PM.  Always use your most  recent med list.                   Brand Name Dispense Instructions for use Diagnosis    aspirin 81 MG tablet     90 tablet    Take 1 tablet (81 mg) by mouth daily    Benign essential hypertension       atorvastatin 20 MG tablet    LIPITOR    90 tablet    Take 1 tablet (20 mg) by mouth daily    Hyperlipidemia LDL goal <100       donepezil 5 MG tablet    ARIcept    90 tablet    TAKE 1 TABLET(5 MG) BY MOUTH AT BEDTIME    Dementia without behavioral disturbance, unspecified dementia type       losartan 100 MG tablet    COZAAR    90 tablet    TAKE 1 TABLET(100 MG) BY MOUTH DAILY    Benign essential hypertension       order for DME     1 each    Equipment being ordered: blood pressure machine and cuff    Essential hypertension       predniSONE 20 MG tablet    DELTASONE    10 tablet    Take 2 tablets (40 mg) by mouth daily for 5 days    Lumbar radiculopathy

## 2017-10-02 ENCOUNTER — TELEPHONE (OUTPATIENT)
Dept: FAMILY MEDICINE | Facility: OTHER | Age: 72
End: 2017-10-02

## 2017-10-02 DIAGNOSIS — I10 BENIGN ESSENTIAL HYPERTENSION: ICD-10-CM

## 2017-10-02 DIAGNOSIS — F03.90 DEMENTIA WITHOUT BEHAVIORAL DISTURBANCE, UNSPECIFIED DEMENTIA TYPE: ICD-10-CM

## 2017-10-02 NOTE — TELEPHONE ENCOUNTER
Reason for Call:  Form, our goal is to have forms completed with 72 hours, however, some forms may require a visit or additional information.    Type of letter, form or note:  SHRAVAN    Who is the form from?: SHRAVAN (if other please explain)    Where did the form come from: form was faxed in    What clinic location was the form placed at?: [facility] - [phone]    Where the form was placed: 's Box    What number is listed as a contact on the form?: [phone]       Additional comments: Fax to     Call taken on 10/2/2017 at 12:02 PM by Shahrzad Obando

## 2017-10-02 NOTE — TELEPHONE ENCOUNTER
donepezil      Last Written Prescription Date: 05/05/17  Last Fill Quantity: 90,  # refills: 0   Last Office Visit with [facility], [facility] or [facility] prescribing provider: 09/21/17

## 2017-10-02 NOTE — TELEPHONE ENCOUNTER
cozaar      Last Written Prescription Date: 07/03/17  Last Fill Quantity: 90, # refills: 0  Last Office Visit with [facility], [facility] or [facility] prescribing provider: 09/21/17       Potassium   Date Value Ref Range Status   03/10/2017 4.6 3.6 - 5.3 mmol/L Final     Creatinine   Date Value Ref Range Status   03/10/2017 0.9 0.4 - 1.4 mg/dL Final     BP Readings from Last 3 Encounters:   09/21/17 112/74   12/26/16 (!) 154/92   01/06/16 (!) 140/94

## 2017-10-03 ENCOUNTER — THERAPY VISIT (OUTPATIENT)
Dept: PHYSICAL THERAPY | Facility: CLINIC | Age: 72
End: 2017-10-03
Payer: MEDICARE

## 2017-10-03 DIAGNOSIS — M53.3 SI (SACROILIAC) JOINT DYSFUNCTION: ICD-10-CM

## 2017-10-03 DIAGNOSIS — G89.29 CHRONIC BILATERAL LOW BACK PAIN WITH LEFT-SIDED SCIATICA: ICD-10-CM

## 2017-10-03 DIAGNOSIS — M54.42 CHRONIC BILATERAL LOW BACK PAIN WITH LEFT-SIDED SCIATICA: ICD-10-CM

## 2017-10-03 PROCEDURE — 97110 THERAPEUTIC EXERCISES: CPT | Mod: GP | Performed by: PHYSICAL THERAPIST

## 2017-10-03 RX ORDER — LOSARTAN POTASSIUM 100 MG/1
TABLET ORAL
Qty: 90 TABLET | Refills: 0 | Status: ON HOLD | OUTPATIENT
Start: 2017-10-03 | End: 2018-10-01

## 2017-10-03 NOTE — TELEPHONE ENCOUNTER
Prescription approved per [facility] Refill Protocol.  Pt is due for labs in March.    Evelia Shaw RN

## 2017-10-05 RX ORDER — DONEPEZIL HYDROCHLORIDE 5 MG/1
5 TABLET, FILM COATED ORAL AT BEDTIME
Qty: 90 TABLET | Refills: 0 | Status: SHIPPED | OUTPATIENT
Start: 2017-10-05 | End: 2018-05-29

## 2017-10-10 ENCOUNTER — THERAPY VISIT (OUTPATIENT)
Dept: PHYSICAL THERAPY | Facility: CLINIC | Age: 72
End: 2017-10-10
Payer: MEDICARE

## 2017-10-10 DIAGNOSIS — G89.29 CHRONIC BILATERAL LOW BACK PAIN WITH LEFT-SIDED SCIATICA: ICD-10-CM

## 2017-10-10 DIAGNOSIS — M53.3 SI (SACROILIAC) JOINT DYSFUNCTION: ICD-10-CM

## 2017-10-10 DIAGNOSIS — M54.42 CHRONIC BILATERAL LOW BACK PAIN WITH LEFT-SIDED SCIATICA: ICD-10-CM

## 2017-10-10 PROCEDURE — 97112 NEUROMUSCULAR REEDUCATION: CPT | Mod: GP | Performed by: PHYSICAL THERAPIST

## 2017-10-10 PROCEDURE — 97110 THERAPEUTIC EXERCISES: CPT | Mod: GP | Performed by: PHYSICAL THERAPIST

## 2017-10-10 NOTE — MR AVS SNAPSHOT
After Visit Summary   10/10/2017    Duane Tegg    MRN: [id number]           Patient Information     Date Of Birth          1945        Visit Information        Provider Department      10/10/2017 12:10 PM Cruz Fish PT [facility] Physical Therapy        Today's Diagnoses     Chronic bilateral low back pain with left-sided sciatica        SI (sacroiliac) joint dysfunction           Follow-ups after your visit        Who to contact     If you have questions or need follow up information about today's clinic visit or your schedule please contact [facility] directly at [phone].  Normal or non-critical lab and imaging results will be communicated to you by JNS Towershart, letter or phone within 4 business days after the clinic has received the results. If you do not hear from us within 7 days, please contact the clinic through JNS Towershart or phone. If you have a critical or abnormal lab result, we will notify you by phone as soon as possible.  Submit refill requests through SnapUp or call your pharmacy and they will forward the refill request to us. Please allow 3 business days for your refill to be completed.          Additional Information About Your Visit        MyChart Information     SnapUp gives you secure access to your electronic health record. If you see a primary care provider, you can also send messages to your care team and make appointments. If you have questions, please call your primary care clinic.  If you do not have a primary care provider, please call [phone] and they will assist you.        Care EveryWhere ID     This is your Care EveryWhere ID. This could be used by other organizations to access your [place] medical records  [id number]         Blood Pressure from Last 3 Encounters:   09/21/17 112/74   12/26/16 (!) 154/92   01/06/16 (!) 140/94    Weight from Last 3 Encounters:   09/21/17 89.4  kg (197 lb)   12/26/16 92.5 kg (204 lb)   12/24/15 92.5 kg (204 lb)              We Performed the Following     NEUROMUSCULAR RE-EDUCATION     THERAPEUTIC EXERCISES        Primary Care Provider Office Phone # Fax #    Heike Antonio [phone]       [address]  [facility] [address]        Equal Access to Services     [facility]: Hadii aad ku hadasho Soomaali, waaxda luqadaha, qaybta kaalmada adeegyada, waxay christianoin hayaan adelorie newmancherriekatherine ladanielle . So [facility] [phone].    ATENCIÓN: Si chidila della, tiene a jorge disposición servicios gratuitos de asistencia lingüística. Tiago al [phone].    We comply with applicable federal civil rights laws and [place] laws. We do not discriminate on the basis of race, color, national origin, age, disability, sex, sexual orientation, or gender identity.            Thank you!     Thank you for choosing [place] [facility] THERAPY  for your care. Our goal is always to provide you with excellent care. Hearing back from our patients is one way we can continue to improve our services. Please take a few minutes to complete the written survey that you may receive in the mail after your visit with us. Thank you!             Your Updated Medication List - Protect others around you: Learn how to safely use, store and throw away your medicines at www.disposemymeds.org.          This list is accurate as of: 10/10/17 12:51 PM.  Always use your most recent med list.                   Brand Name Dispense Instructions for use Diagnosis    aspirin 81 MG tablet     90 tablet    Take 1 tablet (81 mg) by mouth daily    Benign essential hypertension       atorvastatin 20 MG tablet    LIPITOR    90 tablet    Take 1 tablet (20 mg) by mouth daily    Hyperlipidemia LDL goal <100       donepezil 5 MG tablet    ARIcept    90 tablet    Take 1 tablet (5 mg) by mouth At Bedtime    Dementia without behavioral disturbance, unspecified dementia  type       losartan 100 MG tablet    COZAAR    90 tablet    TAKE 1 TABLET(100 MG) BY MOUTH DAILY    Benign essential hypertension       order for DME     1 each    Equipment being ordered: blood pressure machine and cuff    Essential hypertension

## 2018-01-11 PROBLEM — G89.29 CHRONIC BILATERAL LOW BACK PAIN WITH LEFT-SIDED SCIATICA: Chronic | Status: RESOLVED | Noted: 2017-09-26 | Resolved: 2018-01-11

## 2018-01-11 PROBLEM — M54.42 CHRONIC BILATERAL LOW BACK PAIN WITH LEFT-SIDED SCIATICA: Chronic | Status: RESOLVED | Noted: 2017-09-26 | Resolved: 2018-01-11

## 2018-01-11 PROBLEM — M53.3 SI (SACROILIAC) JOINT DYSFUNCTION: Status: RESOLVED | Noted: 2017-09-26 | Resolved: 2018-01-11

## 2018-01-11 NOTE — PROGRESS NOTES
Discharge Note    Progress reporting period is from initial eval to Oct 10, 2017.     Duane failed to return for next follow up visit and current status is unknown.  Please see information below for last relevant information on current status.  Patient seen for 3 visits.  SUBJECTIVE  Subjective changes noted by patient:  Pt reports the low back is doing good, states he no longer has the issues with long term sitting and standing. His primary issue is a heaviness in the legs with long term walking, feels like his L leg drags under him. His shin pain is now gone. Pain rating down to 2/10 level.   .  Current pain level is 2/10.     Previous pain level was  6/10.   Changes in function:  Yes (See Goal flowsheet attached for changes in current functional level)  Adverse reaction to treatment or activity: None    OBJECTIVE  Changes noted in objective findings: SI re check demonstarting level landmarks and symmetrical motion.      ASSESSMENT/PLAN  Diagnosis: low back pain    DIAGP:  Diagnoses of Chronic bilateral low back pain with left-sided sciatica and SI (sacroiliac) joint dysfunction were pertinent to this visit.  Updated problem list and treatment plan:   Pain - HEP  Decreased ROM/flexibility - HEP  Decreased function - HEP  Impaired gait - HEP  STG/LTGs have been met or progress has been made towards goals:  Yes, please see goal flowsheet for most current information  Assessment of Progress: current status is unknown, unable to enter G Codes   Last current status: Pt is progressing as expected   Self Management Plans:  HEP  I have re-evaluated this patient and find that the nature, scope, duration and intensity of the therapy is appropriate for the medical condition of the patient.  Duane continues to require the following intervention to meet STG and LTG's:  HEP.    Recommendations:  Discharge with current home program.  Patient to follow up with MD as needed.    Please refer to the daily flowsheet for treatment  today, total treatment time and time spent performing 1:1 timed codes.

## 2018-01-31 DIAGNOSIS — E78.5 HYPERLIPIDEMIA LDL GOAL <100: ICD-10-CM

## 2018-02-02 RX ORDER — ATORVASTATIN CALCIUM 20 MG/1
20 TABLET, FILM COATED ORAL DAILY
Qty: 90 TABLET | Refills: 0 | Status: SHIPPED | OUTPATIENT
Start: 2018-02-02 | End: 2018-05-29

## 2018-02-02 NOTE — TELEPHONE ENCOUNTER
Atorvastatin    Routing refill request to provider for review/approval because:  Labs not current:  LDL  A break in medication    Peg Hughes RN, BSN

## 2018-04-03 ENCOUNTER — TRANSFERRED RECORDS (OUTPATIENT)
Dept: HEALTH INFORMATION MANAGEMENT | Facility: CLINIC | Age: 73
End: 2018-04-03

## 2018-04-11 ENCOUNTER — TRANSFERRED RECORDS (OUTPATIENT)
Dept: HEALTH INFORMATION MANAGEMENT | Facility: CLINIC | Age: 73
End: 2018-04-11

## 2018-05-29 DIAGNOSIS — F03.90 DEMENTIA WITHOUT BEHAVIORAL DISTURBANCE, UNSPECIFIED DEMENTIA TYPE: ICD-10-CM

## 2018-05-29 DIAGNOSIS — E78.5 HYPERLIPIDEMIA LDL GOAL <100: ICD-10-CM

## 2018-05-31 NOTE — TELEPHONE ENCOUNTER
Donepezil    Routing refill request to provider for review/approval because:  A break in medication      Atorvastatin    Routing refill request to provider for review/approval because:  Labs not current:  LDL    Peg Hughes RN, BSN

## 2018-06-01 RX ORDER — DONEPEZIL HYDROCHLORIDE 5 MG/1
TABLET, FILM COATED ORAL
Qty: 90 TABLET | Refills: 0 | Status: SHIPPED | OUTPATIENT
Start: 2018-06-01 | End: 2018-09-15

## 2018-06-01 RX ORDER — ATORVASTATIN CALCIUM 20 MG/1
TABLET, FILM COATED ORAL
Qty: 90 TABLET | Refills: 0 | Status: ON HOLD | OUTPATIENT
Start: 2018-06-01 | End: 2018-10-01

## 2018-07-09 NOTE — PROGRESS NOTES
SUBJECTIVE:   Duane Tegg is a 72 year old male who presents to clinic today for the following health issues:      History of Present Illness     Hypertension:     Outpatient blood pressures:  Are not being checked    Dietary sodium intake::  Not monitoring salt intake    Back Pain       Duration: few years        Specific cause: none    Description:   Location of pain: low back bilateral  Character of pain: sharp, stabbing and intermittent  Pain radiation:radiates into the right buttocks, radiates into the right leg, radiates into the left buttocks and radiates into the left leg  New numbness or weakness in legs, not attributed to pain:  no     Intensity: Currently 7/10, moderate    History:   Pain interferes with job: Not applicable  History of back problems: no prior back problems  Any previous MRI or X-rays: None  Sees a specialist for back pain:  No  Therapies tried without relief: Physical Therapy and steroid    Alleviating factors:   Improved by: steroid    Precipitating factors:  Worsened by: Standing and Sitting      Accompanying Signs & Symptoms:  Risk of Fracture:  None  Risk of Cauda Equina:  None  Risk of Infection:  None  Risk of Cancer:  None  Risk of Ankylosing Spondylitis:  Onset at age <35, male, AND morning back stiffness. no         Problem list and histories reviewed & adjusted, as indicated.  Additional history: as documented        Patient Active Problem List   Diagnosis     Right bundle branch block     Essential hypertension     Mild cognitive impairment     Dementia without behavioral disturbance, unspecified dementia type     Past Surgical History:   Procedure Laterality Date     COLONOSCOPY N/A 1/6/2016    Procedure: COMBINED COLONOSCOPY, SINGLE OR MULTIPLE BIOPSY/POLYPECTOMY BY BIOPSY;  Surgeon: Ryan Nicholas MD;  Location:  GI       Social History   Substance Use Topics     Smoking status: Former Smoker     Smokeless tobacco: Never Used     Alcohol use Yes     Family History  "  Problem Relation Age of Onset     Diabetes Father          Current Outpatient Prescriptions   Medication Sig Dispense Refill     aspirin 81 MG tablet Take 1 tablet (81 mg) by mouth daily 90 tablet 3     atorvastatin (LIPITOR) 20 MG tablet TAKE 1 TABLET(20 MG) BY MOUTH DAILY 90 tablet 0     diazepam (VALIUM) 10 MG tablet Take 30-60 minutes before procedure.  Do not operate a vehicle after taking this medication. 1 tablet 0     donepezil (ARICEPT) 5 MG tablet TAKE 1 TABLET(5 MG) BY MOUTH AT BEDTIME 90 tablet 0     losartan (COZAAR) 100 MG tablet TAKE 1 TABLET(100 MG) BY MOUTH DAILY 90 tablet 0     order for DME Equipment being ordered: blood pressure machine and cuff (Patient not taking: Reported on 7/12/2018) 1 each 0     No Known Allergies  BP Readings from Last 3 Encounters:   07/12/18 128/76   09/21/17 112/74   12/26/16 (!) 154/92    Wt Readings from Last 3 Encounters:   07/12/18 197 lb (89.4 kg)   09/21/17 197 lb (89.4 kg)   12/26/16 204 lb (92.5 kg)                  Labs reviewed in EPIC    ROS:  Constitutional, HEENT, cardiovascular, pulmonary, gi and gu systems are negative, except as otherwise noted.    OBJECTIVE:     /76 (BP Location: Left arm, Patient Position: Chair, Cuff Size: Adult Regular)  Pulse 83  Temp 97.3  F (36.3  C) (Temporal)  Resp 16  Ht 5' 7.2\" (1.707 m)  Wt 197 lb (89.4 kg)  SpO2 94%  BMI 30.67 kg/m2  Body mass index is 30.67 kg/(m^2).   Physical Exam   Constitutional: He is oriented to person, place, and time. He appears well-developed and well-nourished.   HENT:   Head: Normocephalic and atraumatic.   Eyes: EOM are normal.   Neck: Neck supple.   Cardiovascular: Normal rate, regular rhythm and normal heart sounds.    Pulmonary/Chest: Effort normal and breath sounds normal.   Neurological: He is alert and oriented to person, place, and time.   Psychiatric: He has a normal mood and affect.         Diagnostic Test Results:  none     ASSESSMENT/PLAN:     Problem List Items " Addressed This Visit     RESOLVED: Chronic bilateral low back pain with left-sided sciatica (Chronic)    Relevant Medications    diazepam (VALIUM) 10 MG tablet    Other Relevant Orders    [facility] PT, HAND, AND CHIROPRACTIC REFERRAL    MR Lumbar Spine w/o Contrast    Essential hypertension     Well controlled on the current dose of cozaar         Dementia without behavioral disturbance, unspecified dementia type                Relevant Medications    diazepam (VALIUM) 10 MG tablet      Other Visit Diagnoses     Need for prophylactic vaccination with tetanus-diphtheria (TD)    -  Primary    Balance problems        Relevant Orders    [facility] PT, HAND, AND CHIROPRACTIC REFERRAL    Claustrophobia        Relevant Medications    diazepam (VALIUM) 10 MG tablet         Persistent low back pain not responsive to therapy and mild improvement with oral steroids. Trial repeat therapy and get MRI to look for nerve compromise. Exam is not consistent with any myelopathy . Normal strength and sensations b/l lower extremities. Negative striaght leg rise.     Heike Antonio MD  [facility]

## 2018-07-12 ENCOUNTER — OFFICE VISIT (OUTPATIENT)
Dept: FAMILY MEDICINE | Facility: OTHER | Age: 73
End: 2018-07-12
Payer: COMMERCIAL

## 2018-07-12 ENCOUNTER — HOSPITAL ENCOUNTER (OUTPATIENT)
Dept: MRI IMAGING | Facility: CLINIC | Age: 73
Discharge: HOME | End: 2018-07-12
Attending: FAMILY MEDICINE | Admitting: FAMILY MEDICINE
Payer: MEDICARE

## 2018-07-12 ENCOUNTER — TELEPHONE (OUTPATIENT)
Dept: FAMILY MEDICINE | Facility: OTHER | Age: 73
End: 2018-07-12

## 2018-07-12 VITALS
BODY MASS INDEX: 30.92 KG/M2 | SYSTOLIC BLOOD PRESSURE: 128 MMHG | TEMPERATURE: 97.3 F | HEIGHT: 67 IN | DIASTOLIC BLOOD PRESSURE: 76 MMHG | WEIGHT: 197 LBS | OXYGEN SATURATION: 94 % | HEART RATE: 83 BPM | RESPIRATION RATE: 16 BRPM

## 2018-07-12 DIAGNOSIS — Z23 NEED FOR PROPHYLACTIC VACCINATION WITH TETANUS-DIPHTHERIA (TD): Primary | ICD-10-CM

## 2018-07-12 DIAGNOSIS — M54.42 CHRONIC BILATERAL LOW BACK PAIN WITH LEFT-SIDED SCIATICA: ICD-10-CM

## 2018-07-12 DIAGNOSIS — G89.29 CHRONIC BILATERAL LOW BACK PAIN WITH LEFT-SIDED SCIATICA: ICD-10-CM

## 2018-07-12 DIAGNOSIS — F40.240 CLAUSTROPHOBIA: ICD-10-CM

## 2018-07-12 DIAGNOSIS — I10 ESSENTIAL HYPERTENSION: ICD-10-CM

## 2018-07-12 DIAGNOSIS — R26.89 BALANCE PROBLEMS: ICD-10-CM

## 2018-07-12 DIAGNOSIS — F03.90 DEMENTIA WITHOUT BEHAVIORAL DISTURBANCE, UNSPECIFIED DEMENTIA TYPE: ICD-10-CM

## 2018-07-12 PROCEDURE — 72148 MRI LUMBAR SPINE W/O DYE: CPT

## 2018-07-12 PROCEDURE — 99214 OFFICE O/P EST MOD 30 MIN: CPT | Performed by: FAMILY MEDICINE

## 2018-07-12 RX ORDER — DIAZEPAM 10 MG
TABLET ORAL
Qty: 1 TABLET | Refills: 0 | Status: ON HOLD | OUTPATIENT
Start: 2018-07-12 | End: 2018-10-01

## 2018-07-12 ASSESSMENT — ANXIETY QUESTIONNAIRES
7. FEELING AFRAID AS IF SOMETHING AWFUL MIGHT HAPPEN: NOT AT ALL
GAD7 TOTAL SCORE: 1
2. NOT BEING ABLE TO STOP OR CONTROL WORRYING: NOT AT ALL
3. WORRYING TOO MUCH ABOUT DIFFERENT THINGS: NOT AT ALL
5. BEING SO RESTLESS THAT IT IS HARD TO SIT STILL: NOT AT ALL
GAD7 TOTAL SCORE: 1
4. TROUBLE RELAXING: NOT AT ALL
6. BECOMING EASILY ANNOYED OR IRRITABLE: NOT AT ALL
7. FEELING AFRAID AS IF SOMETHING AWFUL MIGHT HAPPEN: NOT AT ALL
GAD7 TOTAL SCORE: 1
1. FEELING NERVOUS, ANXIOUS, OR ON EDGE: SEVERAL DAYS

## 2018-07-12 ASSESSMENT — PATIENT HEALTH QUESTIONNAIRE - PHQ9
SUM OF ALL RESPONSES TO PHQ QUESTIONS 1-9: 4
SUM OF ALL RESPONSES TO PHQ QUESTIONS 1-9: 4

## 2018-07-12 ASSESSMENT — PAIN SCALES - GENERAL: PAINLEVEL: SEVERE PAIN (7)

## 2018-07-12 NOTE — TELEPHONE ENCOUNTER
Patient had MRI completed this afternoon already.  Did not need Valium for procedure, so script shredded.  Sonja Greene CMA ([facility])

## 2018-07-12 NOTE — MR AVS SNAPSHOT
After Visit Summary   7/12/2018    Duane Tegg    MRN: [id number]           Patient Information     Date Of Birth          1945        Visit Information        Provider Department      7/12/2018 11:20 AM Heike Antonio MD [facility]        Today's Diagnoses     Need for prophylactic vaccination with tetanus-diphtheria (TD)    -  1    Chronic bilateral low back pain with left-sided sciatica        Balance problems           Follow-ups after your visit        Additional Services     [facility] PT, HAND, AND CHIROPRACTIC REFERRAL       **This order will print in the [facility] Scheduling Office**    Physical Therapy, Hand Therapy and Chiropractic Care are available through:    *[facility]  *[facility]  *[facility]    Call one number to schedule at any of the above locations: [phone].    Your provider has referred you to: Physical Therapy at [facility] or [facility]    Indication/Reason for Referral: Low Back Pain, balance problems  Onset of Illness:   Therapy Orders: Evaluate and Treat  Special Programs: core strength and balance training   Special Reques  Exercise: Active/Assistive ROM and Conditioning  Manual Therapy:   Modalities: As Indicated:     Melody Oliver      Additional Comments for the Therapist or Chiropractor:    Please be aware that coverage of these services is subject to the terms and limitations of your health insurance plan.  Call member services at your health plan with any benefit or coverage questions.      Please bring the following to your appointment:    *Your personal calendar for scheduling future appointments  *Comfortable clothing                  Your next 10 appointments already scheduled     Jul 12, 2018  1:00 PM CDT   MR LUMBAR SPINE W/O CONTRAST with PHMR2   [facility] ([facility])    [address] [place] [id number] [phone]           Take your medicines as usual,  unless your doctor tells you not to. Bring a list of your current medicines to your exam (including vitamins, minerals and over-the-counter drugs). Also bring the results of similar scans you may have had.  Please remove any body piercings and hair extensions before you arrive.  Follow your doctor s orders. If you do not, we may have to postpone your exam.  You may or may not receive IV contrast for this exam pending the discretion of the Radiologist.  You do not need to do anything special to prepare.  The MRI machine uses a strong magnet. Please wear clothes without metal (snaps, zippers). A sweatsuit works well, or we may give you a hospital gown.   **IMPORTANT** THE INSTRUCTIONS BELOW ARE ONLY FOR THOSE PATIENTS WHO HAVE BEEN PRESCRIBED SEDATION OR GENERAL ANESTHESIA DURING THEIR MRI PROCEDURE:  IF YOUR DOCTOR PRESCRIBED ORAL SEDATION (take medicine to help you relax during your exam):   You must get the medicine from your doctor (oral medication) before you arrive. Bring the medicine to the exam. Do not take it at home. You ll be told when to take it upon arriving for your exam.   Arrive one hour early. Bring someone who can take you home after the test. Your medicine will make you sleepy. After the exam, you may not drive, take a bus or take a taxi by yourself.  IF YOUR DOCTOR PRESCRIBED IV SEDATION:   Arrive one hour early. Bring someone who can take you home after the test. Your medicine will make you sleepy. After the exam, you may not drive, take a bus or take a taxi by yourself.   No eating 6 hours before your exam. You may have clear liquids up until 4 hours before your exam. (Clear liquids include water, clear tea, black coffee and fruit juice without pulp.)  IF YOUR DOCTOR PRESCRIBED ANESTHESIA (be asleep for your exam):   Arrive 1 1/2 hours early. Bring someone who can take you home after the test. You may not drive, take a bus or take a taxi by yourself.   No eating 8 hours before your exam. You may  "have clear liquids up until 4 hours before your exam. (Clear liquids include water, clear tea, black coffee and fruit juice without pulp.)   You will spend four to five hours in the recovery room.  Please call the Imaging Department at your exam site with any questions.              Future tests that were ordered for you today     Open Future Orders        Priority Expected Expires Ordered    MR Lumbar Spine w/o Contrast Routine  7/12/2019 7/12/2018            Who to contact     If you have questions or need follow up information about today's clinic visit or your schedule please contact [facility] directly at [phone].  Normal or non-critical lab and imaging results will be communicated to you by [facility], letter or phone within 4 business days after the clinic has received the results. If you do not hear from us within 7 days, please contact the clinic through [email or web address] or phone. If you have a critical or abnormal lab result, we will notify you by phone as soon as possible.  Submit refill requests through [facility] or call your pharmacy and they will forward the refill request to us. Please allow 3 business days for your refill to be completed.          Additional Information About Your Visit        MyChart Information     [facility] gives you secure access to your electronic health record. If you see a primary care provider, you can also send messages to your care team and make appointments. If you have questions, please call your primary care clinic.  If you do not have a primary care provider, please call [phone] and they will assist you.        Care EveryWhere ID     This is your Care EveryWhere ID. This could be used by other organizations to access your [place] medical records  [id number]        Your Vitals Were     Pulse Temperature Respirations Height Pulse Oximetry BMI (Body Mass Index)    83 97.3  F (36.3  C) (Temporal) 16 5' 7.2\" (1.707 m) 94% 30.67 kg/m2       Blood Pressure from " Last 3 Encounters:   07/12/18 128/76   09/21/17 112/74   12/26/16 (!) 154/92    Weight from Last 3 Encounters:   07/12/18 197 lb (89.4 kg)   09/21/17 197 lb (89.4 kg)   12/26/16 204 lb (92.5 kg)              We Performed the Following     [facility] PT, HAND, AND CHIROPRACTIC REFERRAL        Primary Care Provider Office Phone # Fax #    Heike Antonio [phone]       [address] [facility] [address]  [facility] [address]        Equal Access to Services     [facility]: Hadii telma rincon Sostephen, waaxda chang, qaybta kaalmacyn mccarthy, jr taveras . So [facility] [phone].    ATENCIÓN: Si habla español, tiene a jorge disposición servicios gratuitos de asistencia lingüística. [facility] [phone].    We comply with applicable federal civil rights laws and [place] laws. We do not discriminate on the basis of race, color, national origin, age, disability, sex, sexual orientation, or gender identity.            Thank you!     Thank you for choosing [facility]  for your care. Our goal is always to provide you with excellent care. Hearing back from our patients is one way we can continue to improve our services. Please take a few minutes to complete the written survey that you may receive in the mail after your visit with us. Thank you!             Your Updated Medication List - Protect others around you: Learn how to safely use, store and throw away your medicines at www.disposemymeds.org.          This list is accurate as of 7/12/18 12:01 PM.  Always use your most recent med list.                   Brand Name Dispense Instructions for use Diagnosis    aspirin 81 MG tablet     90 tablet    Take 1 tablet (81 mg) by mouth daily    Benign essential hypertension       atorvastatin 20 MG tablet    LIPITOR    90 tablet    TAKE 1 TABLET(20 MG) BY MOUTH DAILY    Hyperlipidemia LDL goal <100       donepezil 5 MG tablet    ARIcept    90 tablet    TAKE 1 TABLET(5 MG) BY MOUTH AT  BEDTIME    Dementia without behavioral disturbance, unspecified dementia type       losartan 100 MG tablet    COZAAR    90 tablet    TAKE 1 TABLET(100 MG) BY MOUTH DAILY    Benign essential hypertension       order for DME     1 each    Equipment being ordered: blood pressure machine and cuff    Essential hypertension

## 2018-07-13 ASSESSMENT — PATIENT HEALTH QUESTIONNAIRE - PHQ9: SUM OF ALL RESPONSES TO PHQ QUESTIONS 1-9: 4

## 2018-07-13 ASSESSMENT — ANXIETY QUESTIONNAIRES: GAD7 TOTAL SCORE: 1

## 2018-07-16 ENCOUNTER — TELEPHONE (OUTPATIENT)
Dept: FAMILY MEDICINE | Facility: OTHER | Age: 73
End: 2018-07-16

## 2018-07-16 DIAGNOSIS — M43.10 SPONDYLOLISTHESIS, GRADE 1: Primary | ICD-10-CM

## 2018-07-16 NOTE — TELEPHONE ENCOUNTER
Left message for Duane to call clinic back for results-   See dr. Antonio's note..  And to help him schedule with neurology.

## 2018-07-16 NOTE — TELEPHONE ENCOUNTER
Please inform patient that the MRI of the back showed signs of mild slippage of a vertebral bone forward onto the bone below it and is noted to have some narrowing of the spinal  canal.  Given his severity of weakness in his lower extremity I would recommend being evaluated by neurosurgery.  Referral placed.  Please help schedule.

## 2018-07-17 NOTE — TELEPHONE ENCOUNTER
LM for patient to return phone call to clinic about message below.  Sonja Greene CMA ([facility])

## 2018-07-18 ENCOUNTER — MYC MEDICAL ADVICE (OUTPATIENT)
Dept: FAMILY MEDICINE | Facility: OTHER | Age: 73
End: 2018-07-18

## 2018-07-18 NOTE — TELEPHONE ENCOUNTER
Left message for patient to call back. Please see message below. 3rd attempt. MyChart message sent as well.  Yuly Crawford CMA

## 2018-07-18 NOTE — TELEPHONE ENCOUNTER
Pt returned call, amina benavidez, he asked specifically for Yuly and I offered to help him but he declined. Please call. Thank you!

## 2018-07-26 ENCOUNTER — OFFICE VISIT (OUTPATIENT)
Dept: NEUROSURGERY | Facility: OTHER | Age: 73
End: 2018-07-26
Payer: COMMERCIAL

## 2018-07-26 VITALS — DIASTOLIC BLOOD PRESSURE: 86 MMHG | TEMPERATURE: 98 F | HEART RATE: 82 BPM | SYSTOLIC BLOOD PRESSURE: 142 MMHG

## 2018-07-26 DIAGNOSIS — M43.16 SPONDYLOLISTHESIS, LUMBAR REGION: Primary | ICD-10-CM

## 2018-07-26 PROCEDURE — 99203 OFFICE O/P NEW LOW 30 MIN: CPT | Performed by: NEUROLOGICAL SURGERY

## 2018-07-26 NOTE — PROGRESS NOTES
I was asked by Dr. Antonio to see this patient in consultation    72M w/ L4-5 spondylolisthesis, back and leg pain.  2 years of 7/10, aching low back pain, radiating to the bilateral posterior thighs, worse in the mornings, worse after a long car ride home from [place] 4 weeks ago.  No motor symptoms.  MRI showing L4-5 spondylolisthesis with severe stenosis, bilateral L5-S1 foraminal stenosis.       No past medical history on file.  Past Surgical History:   Procedure Laterality Date     COLONOSCOPY N/A 1/6/2016    Procedure: COMBINED COLONOSCOPY, SINGLE OR MULTIPLE BIOPSY/POLYPECTOMY BY BIOPSY;  Surgeon: Ryan Nicholas MD;  Location:  GI     Social History     Social History     Marital status: Single     Spouse name: N/A     Number of children: N/A     Years of education: N/A     Occupational History     Not on file.     Social History Main Topics     Smoking status: Former Smoker     Smokeless tobacco: Never Used     Alcohol use Yes     Drug use: No     Sexual activity: No     Other Topics Concern     Not on file     Social History Narrative     Family History   Problem Relation Age of Onset     Diabetes Father         ROS: 10 point ROS neg other than the symptoms noted above in the HPI.    Physical Exam  /86  Pulse 82  Temp 98  F (36.7  C) (Temporal)  HEENT:  Normocephalic, atraumatic.  PERRLA.  EOM s intact.  Visual fields full to gross exam  Neck:  Supple, non-tender, without lymphadenopathy.  Heart:  No peripheral edema  Lungs:  No SOB  Abdomen:  Non-distended.   Skin:  Warm and dry.  Extremities:  No edema, cyanosis or clubbing.  Psychiatric:  No apparent distress  Musculoskeletal:  Normal bulk and tone    NEUROLOGICAL EXAMINATION:     Mental status:  Alert and Oriented x 3, speech is fluent.  Cranial nerves:  II-XII intact.   Motor:    Shoulder Abduction:  Right:  5/5   Left:  5/5  Biceps:                      Right:  5/5   Left:  5/5  Triceps:                     Right:  5/5   Left:  5/5  Wrist  Extensors:       Right:  5/5   Left:  5/5  Wrist Flexors:           Right:  5/5   Left:  5/5  interosseus :            Right:  5/5   Left:  5/5   Hip Flexor:                Right: 5/5  Left:  5/5  Hip Adductor:             Right:  5/5  Left:  5/5  Hip Abductor:             Right:  5/5  Left:  5/5  Gastroc Soleus:        Right:  5/5  Left:  5/5  Tib/Ant:                      Right:  5/5  Left:  5/5  EHL:                     Right:  5/5  Left:  5/5  Sensation:  Intact  Reflexes:  Negative Babinski.  Negative Clonus.  Negative Hall's.  Coordination:  Smooth finger to nose testing.   Negative pronator drift.  Smooth tandem walking.    A/P:  72M w/ L4-5 spondylolisthesis, back and leg pain    I had a discussion with the patient, reviewing the history, symptoms, and imaging  Will start a course of PT  Will order HUANG

## 2018-07-26 NOTE — NURSING NOTE
"Duane Tegg is a 72 year old male who presents for:  Chief Complaint   Patient presents with     Neurologic Problem     bilateral low back pain     Results     MRI 07/12/18        Initial Vitals:  /86  Pulse 82  Temp 98  F (36.7  C) (Temporal) Estimated body mass index is 30.67 kg/(m^2) as calculated from the following:    Height as of 7/12/18: 5' 7.2\" (1.707 m).    Weight as of 7/12/18: 197 lb (89.4 kg).. There is no height or weight on file to calculate BSA. BP completed using cuff size: regular  Data Unavailable    Do you feel safe in your environment?  Yes  Do you need any refills today? No    Nursing Comments:         Seb Seaman    "

## 2018-07-26 NOTE — MR AVS SNAPSHOT
After Visit Summary   7/26/2018    Duane Tegg    MRN: [id number]           Patient Information     Date Of Birth          1945        Visit Information        Provider Department      7/26/2018 10:00 AM Anthony Cooper MD [facility]        Care Instructions    1. Order for PT at [facility] in [place]. [phone].   2. Ordered a caudal Epidural Steroid Injection. [place] will call you within 48 hours to schedule this. If no decline in symptoms 2 weeks after injections, call our clinic and talk to a nurse.    Please call our clinic with any questions or concerns: [phone]    TODD Jean-Baptiste          Follow-ups after your visit        Who to contact     If you have questions or need follow up information about today's clinic visit or your schedule please contact [facility] directly at [phone].  Normal or non-critical lab and imaging results will be communicated to you by MyChart, letter or phone within 4 business days after the clinic has received the results. If you do not hear from us within 7 days, please contact the clinic through [employer] or phone. If you have a critical or abnormal lab result, we will notify you by phone as soon as possible.  Submit refill requests through [employer] or call your pharmacy and they will forward the refill request to us. Please allow 3 business days for your refill to be completed.          Additional Information About Your Visit        MyChart Information     [employer] gives you secure access to your electronic health record. If you see a primary care provider, you can also send messages to your care team and make appointments. If you have questions, please call your primary care clinic.  If you do not have a primary care provider, please call [phone] and they will assist you.        Care EveryWhere ID     This is your Care EveryWhere ID. This could be used by other organizations to access your [facility]  records  [id number]        Your Vitals Were     Pulse Temperature                82 98  F (36.7  C) (Temporal)           Blood Pressure from Last 3 Encounters:   07/26/18 142/86   07/12/18 128/76   09/21/17 112/74    Weight from Last 3 Encounters:   07/12/18 197 lb (89.4 kg)   09/21/17 197 lb (89.4 kg)   12/26/16 204 lb (92.5 kg)              Today, you had the following     No orders found for display       Primary Care Provider Office Phone # Fax #    Heike Antonio [phone]       [address]  [facility] [address]        Equal Access to Services     MARCELLA SY : Maykel Jiménez, sana downing, siobhan mccarthy, jr taveras . So [facility] [phone].    ATENCIÓN: Si habla español, tiene a jorge disposición servicios gratuitos de asistencia lingüística. Llame al [phone].    We comply with applicable federal civil rights laws and [place] laws. We do not discriminate on the basis of race, color, national origin, age, disability, sex, sexual orientation, or gender identity.            Thank you!     Thank you for choosing [facility] [place]  for your care. Our goal is always to provide you with excellent care. Hearing back from our patients is one way we can continue to improve our services. Please take a few minutes to complete the written survey that you may receive in the mail after your visit with us. Thank you!             Your Updated Medication List - Protect others around you: Learn how to safely use, store and throw away your medicines at www.disposemymeds.org.          This list is accurate as of 7/26/18 10:36 AM.  Always use your most recent med list.                   Brand Name Dispense Instructions for use Diagnosis    aspirin 81 MG tablet     90 tablet    Take 1 tablet (81 mg) by mouth daily    Benign essential hypertension       atorvastatin 20 MG tablet    LIPITOR    90 tablet    TAKE 1 TABLET(20 MG) BY  MOUTH DAILY    Hyperlipidemia LDL goal <100       diazepam 10 MG tablet    VALIUM    1 tablet    Take 30-60 minutes before procedure.  Do not operate a vehicle after taking this medication.    Chronic bilateral low back pain with left-sided sciatica, Claustrophobia       donepezil 5 MG tablet    ARIcept    90 tablet    TAKE 1 TABLET(5 MG) BY MOUTH AT BEDTIME    Dementia without behavioral disturbance, unspecified dementia type       losartan 100 MG tablet    COZAAR    90 tablet    TAKE 1 TABLET(100 MG) BY MOUTH DAILY    Benign essential hypertension       order for DME     1 each    Equipment being ordered: blood pressure machine and cuff    Essential hypertension

## 2018-07-26 NOTE — PATIENT INSTRUCTIONS
1. Order for PT at [facility] in [place]. [phone].   2. Ordered a caudal Epidural Steroid Injection. [place] will call you within 48 hours to schedule this. If no decline in symptoms 2 weeks after injections, call our clinic and talk to a nurse.    Please call our clinic with any questions or concerns: [phone]    TODD Jean-Baptiste

## 2018-07-26 NOTE — LETTER
7/26/2018         RE: Duane Tegg  [id number] [address] [facility] [address]        Dear Colleague,    Thank you for referring your patient, Duane Tegg, to the [facility]. Please see a copy of my visit note below.    I was asked by Dr. Antonio to see this patient in consultation    72M w/ L4-5 spondylolisthesis, back and leg pain.  2 years of 7/10, aching low back pain, radiating to the bilateral posterior thighs, worse in the mornings, worse after a long car ride home from [place] 4 weeks ago.  No motor symptoms.  MRI showing L4-5 spondylolisthesis with severe stenosis, bilateral L5-S1 foraminal stenosis.       No past medical history on file.  Past Surgical History:   Procedure Laterality Date     COLONOSCOPY N/A 1/6/2016    Procedure: COMBINED COLONOSCOPY, SINGLE OR MULTIPLE BIOPSY/POLYPECTOMY BY BIOPSY;  Surgeon: Ryan Nicholas MD;  Location:  GI     Social History     Social History     Marital status: Single     Spouse name: N/A     Number of children: N/A     Years of education: N/A     Occupational History     Not on file.     Social History Main Topics     Smoking status: Former Smoker     Smokeless tobacco: Never Used     Alcohol use Yes     Drug use: No     Sexual activity: No     Other Topics Concern     Not on file     Social History Narrative     Family History   Problem Relation Age of Onset     Diabetes Father         ROS: 10 point ROS neg other than the symptoms noted above in the HPI.    Physical Exam  /86  Pulse 82  Temp 98  F (36.7  C) (Temporal)  HEENT:  Normocephalic, atraumatic.  PERRLA.  EOM s intact.  Visual fields full to gross exam  Neck:  Supple, non-tender, without lymphadenopathy.  Heart:  No peripheral edema  Lungs:  No SOB  Abdomen:  Non-distended.   Skin:  Warm and dry.  Extremities:  No edema, cyanosis or clubbing.  Psychiatric:  No apparent distress  Musculoskeletal:  Normal bulk and tone    NEUROLOGICAL EXAMINATION:     Mental status:  Alert and Oriented x  3, speech is fluent.  Cranial nerves:  II-XII intact.   Motor:    Shoulder Abduction:  Right:  5/5   Left:  5/5  Biceps:                      Right:  5/5   Left:  5/5  Triceps:                     Right:  5/5   Left:  5/5  Wrist Extensors:       Right:  5/5   Left:  5/5  Wrist Flexors:           Right:  5/5   Left:  5/5  interosseus :            Right:  5/5   Left:  5/5   Hip Flexor:                Right: 5/5  Left:  5/5  Hip Adductor:             Right:  5/5  Left:  5/5  Hip Abductor:             Right:  5/5  Left:  5/5  Gastroc Soleus:        Right:  5/5  Left:  5/5  Tib/Ant:                      Right:  5/5  Left:  5/5  EHL:                     Right:  5/5  Left:  5/5  Sensation:  Intact  Reflexes:  Negative Babinski.  Negative Clonus.  Negative Hall's.  Coordination:  Smooth finger to nose testing.   Negative pronator drift.  Smooth tandem walking.    A/P:  72M w/ L4-5 spondylolisthesis, back and leg pain    I had a discussion with the patient, reviewing the history, symptoms, and imaging  Will start a course of PT  Will order HUANG         Again, thank you for allowing me to participate in the care of your patient.        Sincerely,        Anthony Cooper MD

## 2018-08-01 ENCOUNTER — TELEPHONE (OUTPATIENT)
Dept: PALLIATIVE MEDICINE | Facility: CLINIC | Age: 73
End: 2018-08-01

## 2018-08-01 NOTE — TELEPHONE ENCOUNTER
Pre-screening Questions for Radiology Injections:    Injection to be done at which interventional clinic site? [facility] [place]    Instruct patient to arrive as directed prior to the scheduled appointment time:    [place] minutes before      [place]: 1 hour before     Procedure ordered by DR. RIVAS    Procedure ordered? CAUDAL Epidural Steroid Injection    What insurance would patient like us to bill for this procedure? BCBS, MEDICARE      Worker's comp or MVA (motor vehicle accident) -Any injection DO NOT SCHEDULE and route to Lupe Virk.      [employer] - For SI joint injections, DO NOT SCHEDULE and route Pily Ojeda. OrderWithMe NO PA REQUIRED EFFECTIVE 2017      [employer]- MBB's must be scheduled at LEAST two weeks apart      [employer] - Any injection besides hip/shoulder/knee joint DO NOT SCHEDULE and route to Pily Ojeda. She will obtain PA and call pt back to schedule procedure or notify pt of denial.       [employer]-Route to Pily for review    Any chance of pregnancy? Not Applicable   If YES, do NOT schedule and route to RN pool    Is an  needed? No     Patient has a drive home? (mandatory) YES:     Is patient taking any blood thinners (plavix, coumadin, jantoven, warfarin, heparin, pradaxa or dabigatran )? No   If hold needed, do NOT schedule, route to RN pool     Is patient taking any aspirin products? Yes - Pt takes 81 mg daily; instructed to hold 0 day(s) prior to procedure.      If more than 325mg/day do NOT schedule; route to RN pool     For CERVICAL procedures, hold all aspirin products for 6 days.      Does the patient have a bleeding or clotting disorder? No     If YES, okay to schedule AND route to RN nurse pool    **For any patients with platelet count <100, must be forwarded to provider**    Is patient diabetic?  No  If YES, have them bring their glucometer.    Does patient have an active infection or treated for one within  the past week? No     Is patient currently taking any antibiotics?  No     For patients on chronic, preventative, or prophylactic antibiotics, procedures may be scheduled.     For patients on antibiotics for active or recent infection:    Florentino Clements Burton, [place]-antibiotic course must have been completed for 4 days    Is patient currently taking any steroid medications? (i.e. Prednisone, Medrol)  No     For patients on steroid medications:    Florentino Clements Burton, [place]-steroid course must have been completed for 4 days    Reviewed with patient:  If you are started on any steroids or antibiotics between now and your appointment, you must contact us because it may affect our ability to perform your procedure.  Yes    Is patient actively being treated for cancer or immunocompromised? No  If YES, do NOT schedule and route to RN pool     Are you able to get on and off an exam table with minimal or no assistance? Yes  If NO, do NOT schedule and route to RN pool    Are you able to roll over and lay on your stomach with minimal or no assistance? Yes  If NO, do NOT schedule and route to RN pool     Any allergies to contrast dye, iodine, shellfish, or numbing and steroid medications? No  If YES, route to RN pool AND add allergy information to appointment notes    Allergies: Review of patient's allergies indicates no known allergies.      Has the patient had a flu shot or any other vaccinations within 7 days before or after the procedure.  No     Does patient have an MRI/CT?  YES: MRI  (SI joint, hip injections, lumbar sympathetic blocks, and stellate ganglion blocks do not require an MRI)    Was the MRI done w/in the last 3 years?  Yes    Was MRI done at [facility]? Yes      If not, where was it done? N/A       If MRI was not done at [facility], [facility] or [facility] do NOT schedule and route to nursing.  If pt has an imaging disc, the injection may be scheduled but pt has to bring disc to appt.  If they show up w/out disc the injection cannot be done    Reminders (please tell patient if applicable):       Instructed pt to arrive 30 minutes early for IV start if this is for a cervical procedure, ALL sympathetic (stellate ganglion, hypogastric, or lumbar sympathetic block) and all sedation procedures (RFA, spinal cord stimulation trials).  Not Applicable   -IVs are not routinely placed for Dr. Thomspon cervical cases   -Dr. Thomas: IVs for cervical ESIs and cervical TBDs (not CMBBs/facet inj)      If NPO for sedation, informed patient that it is okay to take medications with sips of water (except if they are to hold blood thinners).  Not Applicable   *DO take blood pressure medication if it is prescribed*      If this is for a cervical HUANG, informed patient that aspirin needs to be held for 6 days.   Not Applicable      For all patients not having spinal cord stimulator (SCS) trials or radiofrequency ablations (RFAs), informed patient:    IV sedation is not provided for this procedure.  If you feel that an oral anti-anxiety medication is needed, you can discuss this further with your referring provider or primary care provider.  The Pain Clinic provider will discuss specifics of what the procedure includes at your appointment.  Most procedures last 10-20 minutes.  We use numbing medications to help with any discomfort during the procedure.  NO      Do not schedule procedures requiring IV placement in the first appointment of the day or first appointment after lunch. Do NOT schedule at 0745, 0815 or 1245.       For patients 85 or older we recommend having an adult stay w/ them for the remainder of the day.       Does the patient have any questions?  NO  Lupe Virk  [facility]

## 2018-08-02 ENCOUNTER — RADIANT APPOINTMENT (OUTPATIENT)
Dept: RADIOLOGY | Facility: CLINIC | Age: 73
End: 2018-08-02
Attending: PSYCHIATRY & NEUROLOGY
Payer: COMMERCIAL

## 2018-08-02 ENCOUNTER — RADIOLOGY INJECTION OFFICE VISIT (OUTPATIENT)
Dept: PALLIATIVE MEDICINE | Facility: CLINIC | Age: 73
End: 2018-08-02
Attending: NEUROLOGICAL SURGERY
Payer: COMMERCIAL

## 2018-08-02 VITALS
RESPIRATION RATE: 16 BRPM | HEART RATE: 69 BPM | DIASTOLIC BLOOD PRESSURE: 83 MMHG | OXYGEN SATURATION: 96 % | SYSTOLIC BLOOD PRESSURE: 163 MMHG

## 2018-08-02 DIAGNOSIS — M43.16 SPONDYLOLISTHESIS, LUMBAR REGION: ICD-10-CM

## 2018-08-02 DIAGNOSIS — M54.16 LUMBAR RADICULOPATHY: ICD-10-CM

## 2018-08-02 DIAGNOSIS — M54.16 LUMBAR RADICULOPATHY: Primary | ICD-10-CM

## 2018-08-02 PROCEDURE — 62323 NJX INTERLAMINAR LMBR/SAC: CPT | Performed by: PSYCHIATRY & NEUROLOGY

## 2018-08-02 ASSESSMENT — PAIN SCALES - GENERAL: PAINLEVEL: MODERATE PAIN (5)

## 2018-08-02 NOTE — NURSING NOTE
Pre-procedure Intake    Have you been fasting? NA    If yes, for how long? No     Are you taking a prescribed blood thinner such as coumadin, Plavix, Xarelto?    No    If yes, when did you take your last dose? No k    Do you take aspirin?  Yes     If cervical procedure, have you held aspirin for 6 days?      Do you have any allergies to contrast dye, iodine, steroid and/or numbing medications?  NO    Are you currently taking antibiotics or have an active infection?  NO    Have you had a fever/elevated temperature within the past week? NO    Are you currently taking oral steroids? NO    Do you have a ? Yes       Are you pregnant or breastfeeding?  Not Applicable    Are the vital signs normal?  Yes    Shanda Caldwell MA

## 2018-08-02 NOTE — NURSING NOTE
Discharge Information    IV Discontiued Time:  NA    Amount of Fluid Infused:  NA    Discharge Criteria = When patient returns to baseline or as per MD order    Consciousness:  Pt is fully awake    Circulation:  BP +/- 20% of pre-procedure level    Respiration:  Patient is able to breathe deeply    O2 Sat:  Patient is able to maintain O2 Sat >92% on room air    Activity:  Moves 4 extremities on command    Ambulation:  Patient is able to stand and walk or stand and pivot into wheelchair    Dressing:  Clean/dry or No Dressing    Notes:   Discharge instructions and AVS given to patient    Patient meets criteria for discharge?  YES    Admitted to PCU?  No    Responsible adult present to accompany patient home?  Yes    Signature/Title:    Jeremi Wolfe RN Care Coordinator  [facility]

## 2018-08-02 NOTE — PROGRESS NOTES
Pre procedure Diagnosis: Lumbar radiculopathy  Post procedure Diagnosis: Same  Procedure performed: L5-S1 interlaminar steroid injection  Anesthesia: none  Complications: none  Operators: Isabel Good MD and Brittni Bran MD (pain fellow)    Indications:   Duane Tegg is a 72 year old male was sent by Dr. Cooper for epidural steroid injection.  They have a history of bilateral lower extremity pain, numbness and tingling.  Exam shows tenderness to palpation on left posterior thigh more than right and they have tried conservative treatment including physical therapy and medications.    MRI was done on 7/12/2018 which showed     IMPRESSION:  1. 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  2. L4-L5 severe central stenosis.  3. Multilevel foraminal stenosis, greatest on the left at L4-L5 and  L5-S1.    Options/alternatives, benefits and risks were discussed with the patient including bleeding, infection, tissue trauma, numbness, weakness, paralysis, spinal cord injury, radiation exposure, headache, reaction to medications including seizure, and effects on the bladder from local anesthetic.  Questions were answered to his satisfaction and he agrees to proceed. Voluntary informed consent was obtained and signed.     Vitals were reviewed: Yes  Allergies were reviewed:  Yes   Medications were reviewed:  Yes   Pre-procedure pain score: 5/10    Procedure:  After getting informed consent, patient was brought into the procedure suite and was placed in a prone position on the procedure table.   A Pause for the Cause was performed.  Patient was prepped and draped in sterile fashion.     The L5-S1 interspace was identified with AP fluoroscopy.  A total of 3ml of 1% lidocaine was used to anesthetize the skin for a left paramedian approach.    A 20gauge 3.5inch Touhy needle was advanced under intermittent fluoroscopy.  A OBED syringe was used to advance the  needle into the epidural space.   After loss of resistance, there was no evidence of blood or CSF on aspiration. Location was verified with both AP and lateral fluoroscopic imaging.    A total of 1ml of Omnipaque 300 was injected demonstrating appropriate epidural spread and was checked in both the AP and lateral views. 9ml was wasted. There was no evidence of intravascular spread.    2 ml of 0.2% ropivacaine with 10mg of dexamethasone and 2ml of preservative free saline was injected.  The needle was removed from the epidural space, flushed with 1% lidocaine and removed.     Hemostasis was achieved, the area was cleaned, and bandaids were placed when appropriate.  The patient tolerated the procedure well, and was taken to the recovery room.    Images were saved to PACS.    Post-procedure pain score: 6/10  Follow-up includes:   -f/u phone call in one week  -f/u with  Dr. Kenneth Good MD  [facility]

## 2018-08-02 NOTE — MR AVS SNAPSHOT
After Visit Summary   8/2/2018    Duane Tegg    MRN: [id number]           Patient Information     Date Of Birth          1945        Visit Information        Provider Department      8/2/2018 10:45 AM Katrin Good MD [facility]        Today's Diagnoses     Spondylolisthesis, lumbar region          Care Instructions    [facility]   Procedure Discharge Instructions    Today you saw:    Dr. Katrin Good      You had an:  Caudal Epidural steroid injection      Medications used:  Lidocaine    Dexamethasone   Omnipaque  Ropivicaine    Normal saline            Be cautious when walking. Numbness and/or weakness in the lower extremities may occur for up to 6-8 hours after the procedure due to effect of the local anesthetic    Do not drive for 6 hours. The effect of the local anesthetic could slow your reflexes.     You may resume your regular activities after 24 hours    Avoid strenuous activity for the first 24 hours    You may shower, however avoid swimming, tub baths or hot tubs for 24 hours following your procedure    You may have a mild to moderate increase in pain for several days following the injection.    It may take up to 14 days for the steroid medication to start working although you may feel the effect as early as a few days after the procedure.       You may use ice packs for 10-15 minutes, 3 to 4 times a day at the injection site for comfort    Do not use heat to painful areas for 6 to 8 hours. This will give the local anesthetic time to wear off and prevent you from accidentally burning your skin.     You may use anti-inflammatory medications (such as Ibuprofen or Aleve or Advil) or Tylenol for pain control if necessary    If you experience any of the following, call the Pain Clinic during work hours at [phone] or the Provider Line after hours at [phone]:  -Fever over 100 degree F  -Swelling, bleeding, redness, drainage, warmth at  the injection site  -Progressive weakness or numbness in your legs   -Loss of bowel or bladder function  -Unusual new onset of pain that is not improving                  Follow-ups after your visit        Who to contact     If you have questions or need follow up information about today's clinic visit or your schedule please contact [facility] directly at [phone].  Normal or non-critical lab and imaging results will be communicated to you by MyChart, letter or phone within 4 business days after the clinic has received the results. If you do not hear from us within 7 days, please contact the clinic through [employer]hart or phone. If you have a critical or abnormal lab result, we will notify you by phone as soon as possible.  Submit refill requests through [employer] or call your pharmacy and they will forward the refill request to us. Please allow 3 business days for your refill to be completed.          Additional Information About Your Visit        MyChart Information     [employer] gives you secure access to your electronic health record. If you see a primary care provider, you can also send messages to your care team and make appointments. If you have questions, please call your primary care clinic.  If you do not have a primary care provider, please call [phone] and they will assist you.        Care EveryWhere ID     This is your Care EveryWhere ID. This could be used by other organizations to access your [place] medical records  [id number]        Your Vitals Were     Pulse                   72            Blood Pressure from Last 3 Encounters:   08/02/18 158/84   07/26/18 142/86   07/12/18 128/76    Weight from Last 3 Encounters:   07/12/18 89.4 kg (197 lb)   09/21/17 89.4 kg (197 lb)   12/26/16 92.5 kg (204 lb)              We Performed the Following     IR Caudal Epidural Injection Single        Primary Care Provider Office Phone # Fax #    Heike Antonio [phone]  [address] [facility] [address]  [facility] [address]        Equal Access to Services     MARCELLA SY : Hadii aad ku hadloretasaurabh Sostephen, waaxda luqadaha, qaybta kaamayacyn mccarthy, jr newmancherriekatherine moise. So [facility] [phone].    ATENCIÓN: Si habla español, tiene a jorge disposición servicios gratuitos de asistencia lingüística. [facility] [phone].    We comply with applicable federal civil rights laws and [place] laws. We do not discriminate on the basis of race, color, national origin, age, disability, sex, sexual orientation, or gender identity.            Thank you!     Thank you for choosing [facility]  for your care. Our goal is always to provide you with excellent care. Hearing back from our patients is one way we can continue to improve our services. Please take a few minutes to complete the written survey that you may receive in the mail after your visit with us. Thank you!             Your Updated Medication List - Protect others around you: Learn how to safely use, store and throw away your medicines at www.disposemymeds.org.          This list is accurate as of 8/2/18 10:52 AM.  Always use your most recent med list.                   Brand Name Dispense Instructions for use Diagnosis    aspirin 81 MG tablet     90 tablet    Take 1 tablet (81 mg) by mouth daily    Benign essential hypertension       atorvastatin 20 MG tablet    LIPITOR    90 tablet    TAKE 1 TABLET(20 MG) BY MOUTH DAILY    Hyperlipidemia LDL goal <100       diazepam 10 MG tablet    VALIUM    1 tablet    Take 30-60 minutes before procedure.  Do not operate a vehicle after taking this medication.    Chronic bilateral low back pain with left-sided sciatica, Claustrophobia       donepezil 5 MG tablet    ARIcept    90 tablet    TAKE 1 TABLET(5 MG) BY MOUTH AT BEDTIME    Dementia without behavioral disturbance, unspecified dementia type       losartan 100 MG tablet    COZAAR    90 tablet    TAKE 1 TABLET(100 MG) BY MOUTH DAILY     Benign essential hypertension       order for DME     1 each    Equipment being ordered: blood pressure machine and cuff    Essential hypertension

## 2018-08-10 ENCOUNTER — TELEPHONE (OUTPATIENT)
Dept: PALLIATIVE MEDICINE | Facility: CLINIC | Age: 73
End: 2018-08-10

## 2018-08-10 ENCOUNTER — THERAPY VISIT (OUTPATIENT)
Dept: PHYSICAL THERAPY | Facility: CLINIC | Age: 73
End: 2018-08-10
Payer: MEDICARE

## 2018-08-10 DIAGNOSIS — M54.50 LUMBAGO: Primary | ICD-10-CM

## 2018-08-10 PROCEDURE — 97110 THERAPEUTIC EXERCISES: CPT | Mod: GP | Performed by: PHYSICAL THERAPIST

## 2018-08-10 PROCEDURE — G8981 BODY POS CURRENT STATUS: HCPCS | Mod: GP | Performed by: PHYSICAL THERAPIST

## 2018-08-10 PROCEDURE — 97161 PT EVAL LOW COMPLEX 20 MIN: CPT | Mod: GP | Performed by: PHYSICAL THERAPIST

## 2018-08-10 PROCEDURE — G8982 BODY POS GOAL STATUS: HCPCS | Mod: GP | Performed by: PHYSICAL THERAPIST

## 2018-08-10 NOTE — MR AVS SNAPSHOT
After Visit Summary   8/10/2018    Duane Tegg    MRN: [id number]           Patient Information     Date Of Birth          1945        Visit Information        Provider Department      8/10/2018 9:40 AM Hilligoss, Amanda K, PT [facility]        Today's Diagnoses     Lumbago    -  1       Follow-ups after your visit        Your next 10 appointments already scheduled     Aug 16, 2018  8:40 AM CDT   SHRAVAN Spine with Amanda K Hilligoss, PT   [facility] ([facility]  )    [address]  [facility] [address]   [phone]            Aug 22, 2018 11:20 AM CDT   SHRAVAN Spine with Aileen Moreno PT   [facility] ([facility]  )    [address]  [facility] [address]   [phone]            Aug 30, 2018 11:20 AM CDT   SHRAVAN Spine with Amanda K Hilligoss, PT   [facility] ([facility]  )    [address]  [facility] [address]   [phone]              Who to contact     If you have questions or need follow up information about today's clinic visit or your schedule please contact [facility] directly at [phone].  Normal or non-critical lab and imaging results will be communicated to you by MyChart, letter or phone within 4 business days after the clinic has received the results. If you do not hear from us within 7 days, please contact the clinic through [employer] or phone. If you have a critical or abnormal lab result, we will notify you by phone as soon as possible.  Submit refill requests through [employer] or call your pharmacy and they will forward the refill request to us. Please allow 3 business days for your refill to be completed.          Additional Information About Your Visit        MyChart Information      [employer] gives you secure access to your electronic health record. If you see a primary care provider, you can also send messages to your care team and make appointments. If you have questions, please call your primary care clinic.  If you do not have a primary care provider, please call [phone] and they will assist you.        Care EveryWhere ID     This is your Care EveryWhere ID. This could be used by other organizations to access your [place] medical records  [id number]         Blood Pressure from Last 3 Encounters:   08/02/18 163/83   07/26/18 142/86   07/12/18 128/76    Weight from Last 3 Encounters:   07/12/18 89.4 kg (197 lb)   09/21/17 89.4 kg (197 lb)   12/26/16 92.5 kg (204 lb)              We Performed the Following     HC PT EVAL, LOW COMPLEXITY     SHRAVAN CERT REPORT     SHRAVAN INITIAL EVAL REPORT     THERAPEUTIC EXERCISES        Primary Care Provider Office Phone # Fax #    Heike Antonio [phone]       [address] [place] [id number]        Equal Access to Services     [facility]: Hadii aad ku hadasho Soomaali, waaxda luqadaha, qaybta kaalmada fbaio, jr taveras . So [facility] [phone].    ATENCIÓN: Si habla español, tiene a jorge disposición servicios gratwillardos de asistencia lingüística. [facility] [phone].    We comply with applicable federal civil rights laws and [place] laws. We do not discriminate on the basis of race, color, national origin, age, disability, sex, sexual orientation, or gender identity.            Thank you!     Thank you for choosing [facility]  for your care. Our goal is always to provide you with excellent care. Hearing back from our patients is one way we can continue to improve our services. Please take a few minutes to complete the written survey that you may receive in the mail after your visit with us. Thank you!             Your Updated Medication  List - Protect others around you: Learn how to safely use, store and throw away your medicines at www.disposemymeds.org.          This list is accurate as of 8/10/18 12:46 PM.  Always use your most recent med list.                   Brand Name Dispense Instructions for use Diagnosis    aspirin 81 MG tablet     90 tablet    Take 1 tablet (81 mg) by mouth daily    Benign essential hypertension       atorvastatin 20 MG tablet    LIPITOR    90 tablet    TAKE 1 TABLET(20 MG) BY MOUTH DAILY    Hyperlipidemia LDL goal <100       diazepam 10 MG tablet    VALIUM    1 tablet    Take 30-60 minutes before procedure.  Do not operate a vehicle after taking this medication.    Chronic bilateral low back pain with left-sided sciatica, Claustrophobia       donepezil 5 MG tablet    ARIcept    90 tablet    TAKE 1 TABLET(5 MG) BY MOUTH AT BEDTIME    Dementia without behavioral disturbance, unspecified dementia type       losartan 100 MG tablet    COZAAR    90 tablet    TAKE 1 TABLET(100 MG) BY MOUTH DAILY    Benign essential hypertension       order for DME     1 each    Equipment being ordered: blood pressure machine and cuff    Essential hypertension

## 2018-08-10 NOTE — PROGRESS NOTES
[facility] Initial Evaluation  Subjective:  Patient is a 72 year old male presenting with rehab back hpi. The history is provided by the patient. No  was used.   Duane Tegg is a 72 year old male with a lumbar condition.  Condition occurred with:  Degenerative joint disease.  Condition occurred: other.  This is a chronic condition  Has had low back pain for at least 2 years. Exacerbation of sx after a long car ride home from [place] Late June 2018.    Patient reports pain:  Lower lumbar spine.  Radiates to:  Gluteals right, gluteals left, thigh right and thigh left.  Pain is described as aching and is intermittent and reported as 4/10 and 7/10 (4-7/10 depending on day).  Associated symptoms:  Loss of motion/stiffness and loss of strength. Pain is worse in the A.M..  Symptoms are exacerbated by sitting and relieved by NSAID's and activity/movement.  Since onset symptoms are gradually worsening (before injection; gradually improving since injection on 8/2/18).  Special tests:  MRI (see chart (multiple level degenerative changes)).  Previous treatment includes physical therapy.  There was mild improvement following previous treatment.  General health as reported by patient is good.  Pertinent medical history includes:  High blood pressure.  Medical allergies: no.  Other surgeries include:  No.  Current medications:  Anti-inflammatory, high blood pressure medication and other (see chart).  Current occupation is Retired, Lives along w/ 2 cats; Hobbies include golfing..        Barriers include:  None as reported by the patient.    Red flags:  None as reported by the patient.                        Objective:  System         Lumbar/SI Evaluation  ROM:  Arom wnl lumbar: hamstring length [id number] deg B.  AROM Lumbar:   Flexion:            To ankles ++pull B LE  Ext:                    80% limited (compensates w/ knee flex)    Side Bend:        Left:  To knee +pull low back    Right:   To knee +pull low back  Rotation:           Left:  Min limitation +pull low back    Right:  Min limitation +pull low back  Side Glide:        Left:     Right:         Strength: Hip Flex, abd, Ext, Knee flex, and Ext all 5/5 B; Lower abdominal MMT: 2/5  Lumbar Myotomes:  Lumbar myotomes: all levels WNL when tested in sitting; (-) Toe walking, (-) heelwalking.                  Neural Tension/Mobility:      Left side:SLR or Slump  negative.     Right side:   Slump or SLR  negative.                                                        General     ROS    Assessment/Plan:    Patient is a 72 year old male with lumbar complaints.    Patient has the following significant findings with corresponding treatment plan.                Diagnosis 1:  Low Back pain Pain -  hot/cold therapy, electric stimulation, manual therapy, self management, education, directional preference exercise and home program  Decreased ROM/flexibility - manual therapy, therapeutic exercise, therapeutic activity and home program  Decreased strength - therapeutic exercise, therapeutic activities and home program  Impaired muscle performance - neuro re-education and home program  Decreased function - therapeutic activities and home program    Therapy Evaluation Codes:   1) History comprised of:   Personal factors that impact the plan of care:      Age, Coping style, Overall behavior pattern and Time since onset of symptoms.    Comorbidity factors that impact the plan of care are:      High blood pressure.     Medications impacting care: High blood pressure.  2) Examination of Body Systems comprised of:   Body structures and functions that impact the plan of care:      Lumbar spine.   Activity limitations that impact the plan of care are:      Bending, Cooking, Driving, Dressing, Lifting, Running, Sitting, Stairs, Standing and Walking.  3) Clinical presentation characteristics are:   Stable/Uncomplicated.  4) Decision-Making    Low complexity using  standardized patient assessment instrument and/or measureable assessment of functional outcome.  Cumulative Therapy Evaluation is: Low complexity.    Previous and current functional limitations:  (See Goal Flow Sheet for this information)    Short term and Long term goals: (See Goal Flow Sheet for this information)     Communication ability:  Patient appears to be able to clearly communicate and understand verbal and written communication and follow directions correctly.  Treatment Explanation - The following has been discussed with the patient:   RX ordered/plan of care  Anticipated outcomes  Possible risks and side effects  This patient would benefit from PT intervention to resume normal activities.   Rehab potential is good.    Frequency:  1 X week, once daily  Duration:  for 4 weeks  Discharge Plan:  Achieve all LTG.  Independent in home treatment program.  Reach maximal therapeutic benefit.    Please refer to the daily flowsheet for treatment today, total treatment time and time spent performing 1:1 timed codes.

## 2018-08-10 NOTE — TELEPHONE ENCOUNTER
Patient had a L5-S1 interlaminar steroid injection i on 8/2/18.  Called patient for an update.      Left message that we were calling for an update about how he was doing after the injection.  LM that if he has any problems or questions to call the clinic at [phone].

## 2018-08-10 NOTE — LETTER
DEPARTMENT OF HEALTH AND HUMAN SERVICES  CENTERS FOR MEDICARE & MEDICAID SERVICES    PLAN/UPDATED PLAN OF PROGRESS FOR OUTPATIENT REHABILITATION    PATIENTS NAME:  Tegg, Duane     : 1945    PROVIDER NUMBER:    [id number]    [id number]    PROVIDER NAME: [place] [facility]    MEDICAL RECORD NUMBER: [id number]     START OF CARE DATE:  SOC Date: 08/10/18   TYPE:  PT    PRIMARY/TREATMENT DIAGNOSIS: (Pertinent Medical Diagnosis)  Lumbago    VISITS FROM START OF CARE:  Rxs Used: 1     [place] [facility] Initial Evaluation  Subjective:  Patient is a 72 year old male presenting with rehab back hpi. The history is provided by the patient. No  was used.   Duane Tegg is a 72 year old male with a lumbar condition.  Condition occurred with:  Degenerative joint disease.  Condition occurred: other.  This is a chronic condition  Has had low back pain for at least 2 years. Exacerbation of sx after a long car ride home from [place] Late 2018.    Patient reports pain:  Lower lumbar spine.  Radiates to:  Gluteals right, gluteals left, thigh right and thigh left.  Pain is described as aching and is intermittent and reported as 4/10 and 7/10 (4-7/10 depending on day).  Associated symptoms:  Loss of motion/stiffness and loss of strength. Pain is worse in the A.M..  Symptoms are exacerbated by sitting and relieved by NSAID's and activity/movement.  Since onset symptoms are gradually worsening (before injection; gradually improving since injection on 18).  Special tests:  MRI (see chart (multiple level degenerative changes)).  Previous treatment includes physical therapy.  There was mild improvement following previous treatment.  General health as reported by patient is good.  Pertinent medical history includes:  High blood pressure.  Medical allergies: no.  Other surgeries include:  No.  Current medications:  Anti-inflammatory, high blood  pressure medication and other (see chart).  Current occupation is Retired, Lives along w/ 2 cats; Hobbies include golfing..        Barriers include:  None as reported by the patient.  Red flags:  None as reported by the patient.    Objective:       Lumbar/SI Evaluation  ROM:  Arom wnl lumbar: hamstring length [id number] deg B.  AROM Lumbar:   Flexion:            To ankles ++pull B LE  Ext:                    80% limited (compensates w/ knee flex)    Side Bend:        Left:  To knee +pull low back    Right:  To knee +pull low back  Rotation:           Left:  Min limitation +pull low back    Right:  Min limitation +pull low back  Side Glide:        Left:     Right:    Strength: Hip Flex, abd, Ext, Knee flex, and Ext all 5/5 B; Lower abdominal MMT: 2/5    Lumbar Myotomes:  Lumbar myotomes: all levels WNL when tested in sitting; (-) Toe walking, (-) heelwalking.    Neural Tension/Mobility:    Left side:SLR or Slump  negative.   Right side:   Slump or SLR  negative.     Assessment/Plan:    Patient is a 72 year old male with lumbar complaints.    Patient has the following significant findings with corresponding treatment plan.                Diagnosis 1:  Low Back pain Pain -  hot/cold therapy, electric stimulation, manual therapy, self management, education, directional preference exercise and home program  Decreased ROM/flexibility - manual therapy, therapeutic exercise, therapeutic activity and home program  Decreased strength - therapeutic exercise, therapeutic activities and home program  Impaired muscle performance - neuro re-education and home program  Decreased function - therapeutic activities and home program    Therapy Evaluation Codes:   1) History comprised of:   Personal factors that impact the plan of care:      Age, Coping style, Overall behavior pattern and Time since onset of symptoms.    Comorbidity factors that impact the plan of care are:      High blood pressure.     Medications impacting care: High  "blood pressure.  2) Examination of Body Systems comprised of:   Body structures and functions that impact the plan of care:      Lumbar spine.   Activity limitations that impact the plan of care are:      Bending, Cooking, Driving, Dressing, Lifting, Running, Sitting, Stairs, Standing and Walking.  3) Clinical presentation characteristics are:   Stable/Uncomplicated.  4) Decision-Making    Low complexity using standardized patient assessment instrument and/or measureable assessment of functional outcome.  Cumulative Therapy Evaluation is: Low complexity.    Previous and current functional limitations:  (See Goal Flow Sheet for this information)    Short term and Long term goals: (See Goal Flow Sheet for this information)   Communication ability:  Patient appears to be able to clearly communicate and understand verbal and written communication and follow directions correctly.  Treatment Explanation - The following has been discussed with the patient:   RX ordered/plan of care  Anticipated outcomes  Possible risks and side effects  This patient would benefit from PT intervention to resume normal activities.   Rehab potential is good.  Frequency:  1 X week, once daily  Duration:  for 4 weeks  Discharge Plan:  Achieve all LTG.  Independent in home treatment program.  Reach maximal therapeutic benefit.    Caregiver Signature/Credentials _____________________________ Date ________       Treating Provider: Amanda Hilligoss, DPT   I have reviewed and certified the need for these services and plan of treatment while under my care.        PHYSICIAN'S SIGNATURE:   _________________________________________  Date___________   Anthony Cooper MD    Certification period:  Beginning of Cert date period: 08/10/18 to  End of Cert period date: 11/07/18     Functional Level Progress Report: Please see attached \"Goal Flow sheet for Functional level.\"    ____X____ Continue Services or       ________ DC Services                Service " dates: From  SOC Date: 08/10/18 date to present

## 2018-08-16 ENCOUNTER — THERAPY VISIT (OUTPATIENT)
Dept: PHYSICAL THERAPY | Facility: CLINIC | Age: 73
End: 2018-08-16
Payer: MEDICARE

## 2018-08-16 DIAGNOSIS — R26.89 IMPAIRMENT OF BALANCE: ICD-10-CM

## 2018-08-16 DIAGNOSIS — M54.50 LUMBAGO: Primary | ICD-10-CM

## 2018-08-16 PROCEDURE — 97112 NEUROMUSCULAR REEDUCATION: CPT | Mod: GP | Performed by: PHYSICAL THERAPIST

## 2018-08-16 PROCEDURE — 97110 THERAPEUTIC EXERCISES: CPT | Mod: GP | Performed by: PHYSICAL THERAPIST

## 2018-08-22 ENCOUNTER — THERAPY VISIT (OUTPATIENT)
Dept: PHYSICAL THERAPY | Facility: CLINIC | Age: 73
End: 2018-08-22
Payer: MEDICARE

## 2018-08-22 DIAGNOSIS — M54.50 LUMBAGO: ICD-10-CM

## 2018-08-22 DIAGNOSIS — R26.89 IMPAIRMENT OF BALANCE: ICD-10-CM

## 2018-08-22 PROCEDURE — 97112 NEUROMUSCULAR REEDUCATION: CPT | Mod: GP | Performed by: PHYSICAL THERAPIST

## 2018-08-22 PROCEDURE — 97110 THERAPEUTIC EXERCISES: CPT | Mod: GP | Performed by: PHYSICAL THERAPIST

## 2018-08-30 ENCOUNTER — THERAPY VISIT (OUTPATIENT)
Dept: PHYSICAL THERAPY | Facility: CLINIC | Age: 73
End: 2018-08-30
Payer: MEDICARE

## 2018-08-30 DIAGNOSIS — R26.89 IMPAIRMENT OF BALANCE: ICD-10-CM

## 2018-08-30 DIAGNOSIS — M54.50 LUMBAGO: ICD-10-CM

## 2018-08-30 PROCEDURE — 97112 NEUROMUSCULAR REEDUCATION: CPT | Mod: GP | Performed by: PHYSICAL THERAPIST

## 2018-08-30 PROCEDURE — 97110 THERAPEUTIC EXERCISES: CPT | Mod: GP | Performed by: PHYSICAL THERAPIST

## 2018-08-30 NOTE — PROGRESS NOTES
Subjective:  HPI  Oswestry Score: 22.22 %                 Objective:  System    Physical Exam    General     ROS    Assessment/Plan:    DISCHARGE REPORT    Progress reporting period is from 8/10/18 to 8/30/18.       SUBJECTIVE  Continues to have radiating pain into R calf, especially first thing in the morning. Does feel balance has been improving, especially since starting balance exercises. Also had vision assessed and he states he does have some impairment in depth perception. Feels he would like to trial independent progression of HEP for now.    Current Pain level: 6/10 (first thing in AM; 2/10 current).     Initial Pain level: 4/10.   Changes in function:  Yes (See Goal flowsheet attached for changes in current functional level)  Adverse reaction to treatment or activity: None    OBJECTIVE  Lumbar AROM: Flex to Ankles ++pull B LE, Ext 60% limited, SB to knee B, Rotation L min limitation, R min limitation + pain; Hamstring length lacking 30 B; SLS 3 sec B; DGI Score 22/24 (improved from initial score of 18/24 on 8/16/18)     ASSESSMENT/PLAN  Updated problem list and treatment plan: Diagnosis 1:  Low Back pain  Pain -  self management and home program  Decreased ROM/flexibility - home program  Decreased strength - home program  Impaired balance - home program  Impaired muscle performance - home program  Decreased function - home program  STG/LTGs have been met or progress has been made towards goals:  Yes (See Goal flow sheet completed today.)  Assessment of Progress: Patient is meeting short term goals and is progressing towards long term goals.  Self Management Plans:  Patient is independent in a home treatment program.  Patient is independent in self management of symptoms.  I have re-evaluated this patient and find that the nature, scope, duration and intensity of the therapy is appropriate for the medical condition of the patient.  Duane continues to require the following intervention to meet STG and LTG's:   PT intervention is no longer required to meet STG/LTG.    Recommendations:  This patient is ready to be discharged from therapy and continue their home treatment program.    Please refer to the daily flowsheet for treatment today, total treatment time and time spent performing 1:1 timed codes.

## 2018-08-30 NOTE — MR AVS SNAPSHOT
After Visit Summary   8/30/2018    Duane Tegg    MRN: [id number]           Patient Information     Date Of Birth          1945        Visit Information        Provider Department      8/30/2018 11:20 AM Hilligoss, Amanda K, PT [facility]        Today's Diagnoses     Impairment of balance        Lumbago           Follow-ups after your visit        Who to contact     If you have questions or need follow up information about today's clinic visit or your schedule please contact [facility] directly at [phone].  Normal or non-critical lab and imaging results will be communicated to you by [employer], letter or phone within 4 business days after the clinic has received the results. If you do not hear from us within 7 days, please contact the clinic through [employer] or phone. If you have a critical or abnormal lab result, we will notify you by phone as soon as possible.  Submit refill requests through ver[employer]CAR or call your pharmacy and they will forward the refill request to us. Please allow 3 business days for your refill to be completed.          Additional Information About Your Visit        MyChart Information     [employer]iCAR gives you secure access to your electronic health record. If you see a primary care provider, you can also send messages to your care team and make appointments. If you have questions, please call your primary care clinic.  If you do not have a primary care provider, please call [phone] and they will assist you.        Care EveryWhere ID     This is your Care EveryWhere ID. This could be used by other organizations to access your [employer] medical records  [id number]         Blood Pressure from Last 3 Encounters:   08/02/18 163/83   07/26/18 142/86   07/12/18 128/76    Weight from Last 3 Encounters:   07/12/18 89.4 kg (197 lb)   09/21/17 89.4 kg (197 lb)   12/26/16 92.5 kg (204  wojciech)              We Performed the Following     SHRAVAN PROGRESS NOTES REPORT     NEUROMUSCULAR RE-EDUCATION     THERAPEUTIC EXERCISES        Primary Care Provider Office Phone # Fax #    Heike Antonio [phone]       [address] [place] [address]        Equal Access to Services     MRACELLA SY : Hadii telma raza hadolretao Soomaali, waaxda luqadaha, qaybta kaalmada adeegyada, jr newmancherriekatherine moise. So [facility] [phone].    ATENCIÓN: Si habla español, tiene a jorge disposición servicios gratuitos de asistencia lingüística. Llame al [phone].    We comply with applicable federal civil rights laws and [place] laws. We do not discriminate on the basis of race, color, national origin, age, disability, sex, sexual orientation, or gender identity.            Thank you!     Thank you for choosing [facility]  for your care. Our goal is always to provide you with excellent care. Hearing back from our patients is one way we can continue to improve our services. Please take a few minutes to complete the written survey that you may receive in the mail after your visit with us. Thank you!             Your Updated Medication List - Protect others around you: Learn how to safely use, store and throw away your medicines at www.disposemymeds.org.          This list is accurate as of 8/30/18  3:10 PM.  Always use your most recent med list.                   Brand Name Dispense Instructions for use Diagnosis    aspirin 81 MG tablet     90 tablet    Take 1 tablet (81 mg) by mouth daily    Benign essential hypertension       atorvastatin 20 MG tablet    LIPITOR    90 tablet    TAKE 1 TABLET(20 MG) BY MOUTH DAILY    Hyperlipidemia LDL goal <100       diazepam 10 MG tablet    VALIUM    1 tablet    Take 30-60 minutes before procedure.  Do not operate a vehicle after taking this medication.    Chronic bilateral low back pain with left-sided  sciatica, Claustrophobia       donepezil 5 MG tablet    ARIcept    90 tablet    TAKE 1 TABLET(5 MG) BY MOUTH AT BEDTIME    Dementia without behavioral disturbance, unspecified dementia type       losartan 100 MG tablet    COZAAR    90 tablet    TAKE 1 TABLET(100 MG) BY MOUTH DAILY    Benign essential hypertension       order for DME     1 each    Equipment being ordered: blood pressure machine and cuff    Essential hypertension

## 2018-09-15 DIAGNOSIS — F03.90 DEMENTIA WITHOUT BEHAVIORAL DISTURBANCE, UNSPECIFIED DEMENTIA TYPE: ICD-10-CM

## 2018-09-18 RX ORDER — DONEPEZIL HYDROCHLORIDE 5 MG/1
TABLET, FILM COATED ORAL
Qty: 90 TABLET | Refills: 1 | Status: ON HOLD | OUTPATIENT
Start: 2018-09-18 | End: 2018-10-01

## 2018-09-18 NOTE — TELEPHONE ENCOUNTER
"Requested Prescriptions   Pending Prescriptions Disp Refills     donepezil (ARICEPT) 5 MG tablet [Pharmacy Med Name: DONEPEZIL 5MG TABLETS] 90 tablet 0     Sig: TAKE 1 TABLET(5 MG) BY MOUTH AT BEDTIME    Miscellaneous Dementia Agents Passed    9/15/2018  3:59 PM       Passed - Recent (12 mo) or future (30 days) visit within the authorizing provider's specialty    Patient had office visit in the last 12 months or has a visit in the next 30 days with authorizing provider or within the authorizing provider's specialty.  See \"Patient Info\" tab in inbasket, or \"Choose Columns\" in Meds & Orders section of the refill encounter.           Passed - Patient is 18 years of age or older        Last OV 07/12/2018  Last filled 06/01/2018  Prescription approved per [facility] Refill Protocol.  Justin Aceves, RN, BSN          "

## 2018-09-28 ENCOUNTER — HOSPITAL ENCOUNTER (OUTPATIENT)
Facility: CLINIC | Age: 73
Setting detail: OBSERVATION
Discharge: NURSING, REHAB OR LONG-TERM CARE | End: 2018-10-01
Attending: FAMILY MEDICINE | Admitting: FAMILY MEDICINE
Payer: MEDICARE

## 2018-09-28 ENCOUNTER — APPOINTMENT (OUTPATIENT)
Dept: CT IMAGING | Facility: CLINIC | Age: 73
End: 2018-09-28
Attending: FAMILY MEDICINE
Payer: MEDICARE

## 2018-09-28 ENCOUNTER — APPOINTMENT (OUTPATIENT)
Dept: GENERAL RADIOLOGY | Facility: CLINIC | Age: 73
End: 2018-09-28
Attending: FAMILY MEDICINE
Payer: MEDICARE

## 2018-09-28 DIAGNOSIS — I10 ESSENTIAL HYPERTENSION: ICD-10-CM

## 2018-09-28 DIAGNOSIS — R29.898 WEAKNESS OF BOTH LOWER EXTREMITIES: ICD-10-CM

## 2018-09-28 DIAGNOSIS — I10 BENIGN ESSENTIAL HYPERTENSION: ICD-10-CM

## 2018-09-28 DIAGNOSIS — M62.81 MUSCLE WEAKNESS (GENERALIZED): ICD-10-CM

## 2018-09-28 DIAGNOSIS — M48.061 SPINAL STENOSIS AT L4-L5 LEVEL: ICD-10-CM

## 2018-09-28 DIAGNOSIS — G31.84 MILD COGNITIVE IMPAIRMENT: ICD-10-CM

## 2018-09-28 DIAGNOSIS — E78.5 HYPERLIPIDEMIA LDL GOAL <100: ICD-10-CM

## 2018-09-28 DIAGNOSIS — J06.9 VIRAL UPPER RESPIRATORY ILLNESS: ICD-10-CM

## 2018-09-28 DIAGNOSIS — M48.062 SPINAL STENOSIS OF LUMBAR REGION WITH NEUROGENIC CLAUDICATION: ICD-10-CM

## 2018-09-28 DIAGNOSIS — F03.90 DEMENTIA WITHOUT BEHAVIORAL DISTURBANCE, UNSPECIFIED DEMENTIA TYPE: ICD-10-CM

## 2018-09-28 DIAGNOSIS — Z87.891 PERSONAL HISTORY OF SMOKING: ICD-10-CM

## 2018-09-28 LAB
ALBUMIN SERPL-MCNC: 3.2 G/DL (ref 3.4–5)
ALP SERPL-CCNC: 107 U/L (ref 40–150)
ALT SERPL W P-5'-P-CCNC: 52 U/L (ref 0–70)
ANION GAP SERPL CALCULATED.3IONS-SCNC: 8 MMOL/L (ref 3–14)
AST SERPL W P-5'-P-CCNC: 40 U/L (ref 0–45)
BASOPHILS # BLD AUTO: 0 10E9/L (ref 0–0.2)
BASOPHILS NFR BLD AUTO: 0.5 %
BILIRUB SERPL-MCNC: 0.9 MG/DL (ref 0.2–1.3)
BUN SERPL-MCNC: 20 MG/DL (ref 7–30)
CALCIUM SERPL-MCNC: 8.2 MG/DL (ref 8.5–10.1)
CHLORIDE SERPL-SCNC: 101 MMOL/L (ref 94–109)
CO2 SERPL-SCNC: 25 MMOL/L (ref 20–32)
CREAT SERPL-MCNC: 0.91 MG/DL (ref 0.66–1.25)
CRP SERPL-MCNC: 63.1 MG/L (ref 0–8)
DIFFERENTIAL METHOD BLD: ABNORMAL
EOSINOPHIL NFR BLD AUTO: 2.4 %
ERYTHROCYTE [DISTWIDTH] IN BLOOD BY AUTOMATED COUNT: 12.3 % (ref 10–15)
GFR SERPL CREATININE-BSD FRML MDRD: 81 ML/MIN/1.7M2
GLUCOSE SERPL-MCNC: 113 MG/DL (ref 70–99)
HCT VFR BLD AUTO: 37.9 % (ref 40–53)
HGB BLD-MCNC: 12.9 G/DL (ref 13.3–17.7)
IMM GRANULOCYTES # BLD: 0 10E9/L (ref 0–0.4)
IMM GRANULOCYTES NFR BLD: 0.3 %
LACTATE BLD-SCNC: 0.9 MMOL/L (ref 0.7–2)
LIPASE SERPL-CCNC: 94 U/L (ref 73–393)
LYMPHOCYTES # BLD AUTO: 1.5 10E9/L (ref 0.8–5.3)
LYMPHOCYTES NFR BLD AUTO: 18.5 %
MCH RBC QN AUTO: 30.4 PG (ref 26.5–33)
MCHC RBC AUTO-ENTMCNC: 34 G/DL (ref 31.5–36.5)
MCV RBC AUTO: 89 FL (ref 78–100)
MONOCYTES # BLD AUTO: 1 10E9/L (ref 0–1.3)
MONOCYTES NFR BLD AUTO: 12.5 %
NEUTROPHILS # BLD AUTO: 5.2 10E9/L (ref 1.6–8.3)
NEUTROPHILS NFR BLD AUTO: 65.8 %
NRBC # BLD AUTO: 0 10*3/UL
NRBC BLD AUTO-RTO: 0 /100
PLATELET # BLD AUTO: 146 10E9/L (ref 150–450)
POTASSIUM SERPL-SCNC: 3.9 MMOL/L (ref 3.4–5.3)
PROT SERPL-MCNC: 7.2 G/DL (ref 6.8–8.8)
RBC # BLD AUTO: 4.25 10E12/L (ref 4.4–5.9)
SODIUM SERPL-SCNC: 134 MMOL/L (ref 133–144)
WBC # BLD AUTO: 7.9 10E9/L (ref 4–11)

## 2018-09-28 PROCEDURE — 99285 EMERGENCY DEPT VISIT HI MDM: CPT | Mod: 25 | Performed by: FAMILY MEDICINE

## 2018-09-28 PROCEDURE — 86140 C-REACTIVE PROTEIN: CPT | Performed by: FAMILY MEDICINE

## 2018-09-28 PROCEDURE — 96361 HYDRATE IV INFUSION ADD-ON: CPT | Performed by: FAMILY MEDICINE

## 2018-09-28 PROCEDURE — 83690 ASSAY OF LIPASE: CPT | Performed by: FAMILY MEDICINE

## 2018-09-28 PROCEDURE — 99285 EMERGENCY DEPT VISIT HI MDM: CPT | Mod: Z6 | Performed by: FAMILY MEDICINE

## 2018-09-28 PROCEDURE — 72131 CT LUMBAR SPINE W/O DYE: CPT

## 2018-09-28 PROCEDURE — 25000128 H RX IP 250 OP 636: Performed by: FAMILY MEDICINE

## 2018-09-28 PROCEDURE — 83605 ASSAY OF LACTIC ACID: CPT | Performed by: FAMILY MEDICINE

## 2018-09-28 PROCEDURE — 85025 COMPLETE CBC W/AUTO DIFF WBC: CPT | Performed by: FAMILY MEDICINE

## 2018-09-28 PROCEDURE — 96374 THER/PROPH/DIAG INJ IV PUSH: CPT | Performed by: FAMILY MEDICINE

## 2018-09-28 PROCEDURE — 71046 X-RAY EXAM CHEST 2 VIEWS: CPT | Mod: TC

## 2018-09-28 PROCEDURE — 80053 COMPREHEN METABOLIC PANEL: CPT | Performed by: FAMILY MEDICINE

## 2018-09-28 RX ORDER — SODIUM CHLORIDE 9 MG/ML
1000 INJECTION, SOLUTION INTRAVENOUS CONTINUOUS
Status: DISCONTINUED | OUTPATIENT
Start: 2018-09-28 | End: 2018-10-01 | Stop reason: HOSPADM

## 2018-09-28 RX ORDER — IPRATROPIUM BROMIDE AND ALBUTEROL SULFATE 2.5; .5 MG/3ML; MG/3ML
3 SOLUTION RESPIRATORY (INHALATION) ONCE
Status: COMPLETED | OUTPATIENT
Start: 2018-09-28 | End: 2018-09-29

## 2018-09-28 RX ADMIN — SODIUM CHLORIDE 1000 ML: 9 INJECTION, SOLUTION INTRAVENOUS at 23:42

## 2018-09-28 NOTE — IP AVS SNAPSHOT
Tegg, Duane #[id number] (CSN:[id number])  (73 year old M)  (Adm: 18)     [id number]               [address] MEDICAL SURGICAL: [phone]            Patient Demographics     Patient Name Sex          Age SSN Address Phone    Tegg, Duane Male 1945 (73 year old) [id number]  [facility] [address] [phone] (Home)  [phone] (Mobile)      Emergency Contact(s)     Name Relation Home Work Mobile    YUKI SLATER Son [phone]    SAW SLATER Son   [phone]    EDUARDA SLATER Relative [phone]    GLORIA SLATER Relative [phone]      Admission Information     Attending Provider Admitting Provider Admission Type Admission Date/Time    Nickolas Boggs MD Gould, Wilfred Edwin, MD Emergency 18  2304    Discharge Date Hospital Service Auth/Cert Status Service Area     Hospitalist Incomplete [facility]    Unit Room/Bed Admission Status        2A MEDICAL SURGICAL 255/255-01 Admission (Confirmed)       Admission     Complaint    Leg weakness, bilateral      Hospital Account     Name Acct ID Class Status Primary Coverage    Tegg, Duane [id number] Observation Open MEDICARE - MEDICARE FOR HB SUPPLEMENT            Guarantor Account (for Hospital Account #[id number])     Name Relation to Pt Service Area Active? Acct Type    Tegg, Duane Self FCS Yes Personal/Family    Address Phone           [place], [place] [id number] [phone](H)              Coverage Information (for Hospital Account #[id number])     1. MEDICARE/MEDICARE FOR HB SUPPLEMENT     F/O Payor/Plan Precert #    MEDICARE/MEDICARE FOR HB SUPPLEMENT     Subscriber Subscriber #    Tegg, Duane [id number]    Address Phone    ATTN CLAIMS  [address]  [place] [address] [phone]          2. BCBS/BCBS PLATINUM BLUE     F/O Payor/Plan Precert #    BCBS/BCBS PLATINUM BLUE     Subscriber Subscriber #    Tegg, Duane [id number]  "   Address Phone    [address] [address]  [place], [place] [id number] [phone]                                                      INTERAGENCY TRANSFER FORM - PHYSICIAN ORDERS   9/28/2018                       [address] MEDICAL SURGICAL: [phone]            Attending Provider: Nickolas Boggs MD     Allergies:  No Known Allergies    Infection:  None   Service:  HOSPITALIST    Ht:  1.727 m (5' 8\")   Wt:  91.7 kg (202 lb 2.6 oz)   Admission Wt:  91.7 kg (202 lb 2.6 oz)    BMI:  30.74 kg/m 2   BSA:  2.1 m 2            ED Clinical Impression     Diagnosis Description Comment Added By Time Added    Spinal stenosis at L4-L5 level [M48.061] Spinal stenosis at L4-L5 level [M48.061]  Ryan Benitez DO 9/29/2018  2:02 AM    Weakness of both lower extremities [R29.898] Weakness of both lower extremities [R29.898]  Ryan Benitez DO 9/29/2018  2:57 AM    Viral upper respiratory illness [J06.9, B97.89] Viral upper respiratory illness [J06.9, B97.89]  Ryan Benitez DO 9/29/2018  2:57 AM    Spinal stenosis of lumbar region with neurogenic claudication [M48.062] Spinal stenosis of lumbar region with neurogenic claudication [M48.062]  Ryan Benitez DO 9/29/2018  2:57 AM    Mild cognitive impairment [G31.84] Mild cognitive impairment [G31.84]  Ryan Benitez DO 9/29/2018  2:57 AM    Essential hypertension [I10] Essential hypertension [I10]  Ryan Benitez DO 9/29/2018  2:57 AM      Hospital Problems as of 10/1/2018              Priority Class Noted POA    Essential hypertension Medium  12/7/2015 Yes    Dementia without behavioral disturbance, unspecified dementia type Medium  1/8/2017 Yes    * (Principal)Weakness of both lower extremities Medium  9/29/2018 Yes    Spinal stenosis of lumbar region with neurogenic claudication Medium  9/29/2018 Yes    Viral upper respiratory illness Medium  9/29/2018 Yes    Hyperlipidemia LDL goal <130 Medium  9/29/2018 Yes    "   Non-Hospital Problems as of 10/1/2018              Priority Class Noted    Right bundle branch block Medium  11/26/2015    Mild cognitive impairment Medium  12/26/2015    Lumbago Medium  8/10/2018    Leg weakness, bilateral Medium  9/29/2018      Code Status History     Date Active Date Inactive Code Status Order ID Comments User Context    10/1/2018 11:52 AM  Full Code [id number]  Nickolas Boggs MD Outpatient    9/29/2018  3:13 AM 10/1/2018 11:52 AM Full Code [id number]  Nickolas Boggs MD Inpatient      Current Code Status     Date Active Code Status Order ID Comments User Context       Prior         Medication Review      START taking        Dose / Directions Comments    predniSONE 20 MG tablet   Commonly known as:  DELTASONE   Used for:  Spinal stenosis at L4-L5 level        Dose:  20 mg   Take 1 tablet (20 mg) by mouth daily for 5 days   Quantity:  5 tablet   Refills:  0          CONTINUE these medications which have NOT CHANGED        Dose / Directions Comments    aspirin 81 MG tablet   Used for:  Benign essential hypertension        Dose:  81 mg   Take 1 tablet (81 mg) by mouth daily   Quantity:  90 tablet   Refills:  3        atorvastatin 20 MG tablet   Commonly known as:  LIPITOR   Used for:  Hyperlipidemia LDL goal <100        TAKE 1 TABLET(20 MG) BY MOUTH DAILY   Quantity:  90 tablet   Refills:  0        donepezil 5 MG tablet   Commonly known as:  ARIcept   Used for:  Dementia without behavioral disturbance, unspecified dementia type        TAKE 1 TABLET(5 MG) BY MOUTH AT BEDTIME   Quantity:  90 tablet   Refills:  1        losartan 100 MG tablet   Commonly known as:  COZAAR   Used for:  Benign essential hypertension        TAKE 1 TABLET(100 MG) BY MOUTH DAILY   Quantity:  90 tablet   Refills:  0        order for DME   Used for:  Essential hypertension        Equipment being ordered: blood pressure machine and cuff   Quantity:  1 each   Refills:  0          STOP taking     diazepam 10 MG  "tablet   Commonly known as:  VALIUM                   After Care     Activity - Up with assistive device           Advance Diet as Tolerated       Follow this diet upon discharge: Regular       General info for SNF       Length of Stay Estimate: Short Term Care: Estimated # of Days <30  Condition at Discharge: Improving  Level of care:skilled   Rehabilitation Potential: Good  Admission H&P remains valid and up-to-date: Yes  Recent Chemotherapy: N/A  Use Nursing Home Standing Orders: Yes       Mantoux instructions       Give two-step Mantoux (PPD) Per Facility Policy Yes               Further instructions from your care team       Neurology Appointment:  Monday Oct. 8th at 1:00pm (check in 15 min early and have photo ID and ins card with you)  Dr. Raj BARBER of Neurology/[facility]  [id number] [facility] [place] [id number]  [phone]    Referrals     Occupational Therapy Adult Consult       Evaluate and treat as clinically indicated.    Reason:  Dementia with poor memory and impulsiveness       Physical Therapy Adult Consult       Evaluate and treat as clinically indicated.    Reason:  Leg weakness             Follow-Up Appointment Instructions     Follow Up and recommended labs and tests       Follow up with Nursing home physician.  No follow up labs or test are needed.             Statement of Approval     Ordered          10/01/18 1153  I have reviewed and agree with all the recommendations and orders detailed in this document.  EFFECTIVE NOW     Approved and electronically signed by:  Nickolas Boggs MD                                                 INTERAGENCY TRANSFER FORM - NURSING   9/28/2018                       [address] MEDICAL SURGICAL: [phone]            Attending Provider: Nickolas Boggs MD     Allergies:  No Known Allergies    Infection:  None   Service:  HOSPITALIST    Ht:  1.727 m (5' 8\")   Wt:  91.7 kg (202 lb 2.6 oz)   Admission Wt:  91.7 kg (202 lb 2.6 oz)    BMI:  30.74 " kg/m 2   BSA:  2.1 m 2            Advance Directives        Scanned docmt in ACP Activity?           No scanned doc        Immunizations     Name Date      Influenza (High Dose) 3 valent vaccine 10/01/18     Influenza (High Dose) 3 valent vaccine 09/21/17     Influenza (High Dose) 3 valent vaccine 12/26/16     Influenza (High Dose) 3 valent vaccine 12/24/15     Pneumo Conj 13-V (2010&after) 12/24/15     Pneumococcal 23 valent 12/26/16     Pneumococcal 23 valent 08/14/08     Tdap (Adacel,Boostrix) 08/27/07       ASSESSMENT     Discharge Profile Flowsheet     EXPECTED DISCHARGE     Patient's communication style  spoken language (English or Bilingual) 09/28/18 2300    Expected Discharge Date  10/01/18 10/01/18 1019   FINAL RESOURCES      DISCHARGE NEEDS ASSESSMENT     Resources List  Skilled Nursing Facility 10/01/18 1252    Patient/family verbalizes understanding of discharge plan recommendations?  Yes 10/01/18 1019   [facility] [phone], Fax: [phone] 10/01/18 1252    Medical Team notified of plan?  yes 10/01/18 1019   SKIN      Equipment Currently Used at Home  none 09/30/18 1056   Inspection of bony prominences  Full 10/01/18 0740    Transportation Available  family or friend will provide 10/01/18 1019   Inspection under devices  Full 10/01/18 0740    # of Referrals Placed by CTS  Post Acute Facilities 10/01/18 1019   Skin WDL  WDL 10/01/18 0740    Does Patient Need a Referral for Clinic CC  No 10/01/18 1252   SAFETY      Primary Care Clinic Name  [facility] 09/30/18 1056   Safety WDL  WDL 10/01/18 0740    Primary Care MD Name  Dr. Heike Antonio 09/30/18 1056   All Alarms  alarm(s) activated and audible 10/01/18 0740    COMMUNICATION ASSESSMENT                        Assessment WDL (Within Defined Limits) Definitions           Safety WDL     Effective: 09/28/15    Row Information: <b>WDL Definition:</b> Bed in low position, wheels locked; call  "light in reach; upper side rails up x 2; ID band on<br> <font color=\"gray\"><i>Item=AS safety wdl>>List=AS safety wdl>>Version=F14</i></font>      Skin WDL     Effective: 09/28/15    Row Information: <b>WDL Definition:</b> Warm; dry; intact; elastic; without discoloration; pressure points without redness<br> <font color=\"gray\"><i>Item=AS skin wdl>>List=AS skin wdl>>Version=F14</i></font>      Vitals     Vital Signs Flowsheet     VITAL SIGNS     Best Eye Response  4-->(E4) spontaneous 10/01/18 0734    Temp  96.2  F (35.7  C) 10/01/18 0718   Best Motor Response  6-->(M6) obeys commands 10/01/18 0734    Temp src  Oral 10/01/18 0718   Best Verbal Response  4-->(V4) confused 10/01/18 0734    Resp  20 10/01/18 0718   Asha Coma Scale Score  14 10/01/18 0734    Pulse  55 10/01/18 0718   HEIGHT AND WEIGHT      Heart Rate  55 10/01/18 0718   Height  1.727 m (5' 8\") 09/29/18 0318    Pulse/Heart Rate Source  Monitor 10/01/18 0718   Height Method  Stated 09/29/18 0318    BP  142/69 10/01/18 0718   Weight  91.7 kg (202 lb 2.6 oz) 09/29/18 0318    BP Location  Left arm 10/01/18 0718   Weight Method  Bed scale 09/29/18 0318    OXYGEN THERAPY     Bed Scale  Standard (fitted sheet, draw sheet/ pad, cover/flat sheet, blanket, two pillows) 09/29/18 0318    SpO2  94 % 10/01/18 0718   BSA (Calculated - sq m)  2.1 09/29/18 0318    O2 Device  None (Room air) 10/01/18 0718   BMI (Calculated)  30.8 09/29/18 0318    PAIN/COMFORT     DAILY CARE      Patient Currently in Pain  denies 10/01/18 1038   Activity Management  activity adjusted per tolerance 10/01/18 0319    Preferred Pain Scale  CAPA (Clinically Aligned Pain Assessment) ([facility] Adults Only) 10/01/18 1038   Activity Assistance Provided  assistance, 1 person 10/01/18 0319    CLINICALLY ALIGNED PAIN ASSESSMENT (CAPA) ([facility] ADULTS ONLY)     Assistive Device Utilized  gait belt;walker 10/01/18 0319    Comfort  negligible pain 10/01/18 0734   " Additional Documentation  Activity Device Assistance (Row) 09/29/18 1818    MALA COMA SCALE                   Patient Lines/Drains/Airways Status    Active LINES/DRAINS/AIRWAYS     None            Patient Lines/Drains/Airways Status    Active PICC/CVC     None            Intake/Output Detail Report     Date Intake     Output    Shift P.O. I.V. IV Piggyback Total Total       Noc 09/29/18 2300 - 09/30/18 0659 -- -- -- -- -- 0    Day 09/30/18 0700 - 09/30/18 1459 -- 1793 -- 1793 -- 1793    Laureen 09/30/18 1500 - 09/30/18 2259 500 -- -- 500 -- 500    Noc 09/30/18 2300 - 10/01/18 0659 -- -- -- -- -- 0    Day 10/01/18 0700 - 10/01/18 1459 200 348 -- 548 -- 548      Last Void/BM       Most Recent Value    Urine Occurrence 1 at 09/30/2018 1838    Stool Occurrence       Case Management/Discharge Planning     Case Management/Discharge Planning Flowsheet     REFERRAL INFORMATION     DISCHARGE PLANNING      Did the Initial Social Work Assessment result in a Social Work Case?  Yes 10/01/18 1019   Patient/family verbalizes understanding of discharge plan recommendations?  Yes 10/01/18 1019    Admission Type  observation 09/30/18 1056   Medical Team notified of plan?  yes 10/01/18 1019    # of Referrals Placed by CTS  Post Acute Facilities 10/01/18 1019   Transportation Available  family or friend will provide 10/01/18 1019    Reason For Consult  discharge planning 09/30/18 1056   Does Patient Need a Referral for Clinic CC  No 10/01/18 1252    Primary Care Clinic Name  [facility] 09/30/18 1056   FINAL RESOURCES      Primary Care MD Name  Dr. Heike nAtonio 09/30/18 1056   Equipment Currently Used at Home  none 09/30/18 1056    LIVING ENVIRONMENT     Resources List  Skilled Nursing Facility 10/01/18 1252    Lives With  alone 09/30/18 1056   Skilled Nursing Facility  [facility] [phone], Fax: [phone] 10/01/18 1252    Living Arrangements  house (town home) 09/30/18 1056   ABUSE RISK SCREEN       ASSESSMENT OF FAMILY/SOCIAL SUPPORT     QUESTION TO PATIENT:  Has a member of your family or a partner(now or in the past) intimidated, hurt, manipulated, or controlled you in any way?  patient declined to answer or is unable to answer 09/28/18 2302    Who is your support system?  Children 09/30/18 1056   QUESTION TO PATIENT: Do you feel safe going back to the place where you are living?  patient declined to answer or is unable to answer 09/28/18 2302    Description of Support System  Supportive 09/30/18 1056   OBSERVATION: Is there reason to believe there has been maltreatment of a vulnerable adult (ie. Physical/Sexual/Emotional abuse, self neglect, lack of adequate food, shelter, medical care, or financial exploitation)?  no 09/28/18 2302    Support Assessment  Adequate family and caregiver support 09/30/18 1056   OTHER      EXPECTED DISCHARGE     Are you depressed or being treated for depression?  No 09/29/18 0324    Expected Discharge Date  10/01/18 10/01/18 1019                       [address] SURGICAL: [phone]            Medication Administration Report for Tegg, Duane as of 10/01/18 1331   Legend:    Given Hold Not Given Due Canceled Entry Other Actions    Time Time (Time) Time  Time-Action       Inactive    Active    Linked        Medications 09/25/18 09/26/18 09/27/18 09/28/18 09/29/18 09/30/18 10/01/18    acetaminophen (TYLENOL) Suppository 650 mg  Dose: 650 mg  Freq: EVERY 4 HOURS PRN Route: RE  PRN Reason: mild pain  Start: 09/29/18 0313   Admin Instructions: Alternate ibuprofen (if ordered) with acetaminophen.  Maximum acetaminophen dose from all sources = 75 mg/kg/day not to exceed 4 grams/day.    Admin. Amount: 1 suppository (1 × 650 mg suppository)  Dispense Loc: [facility] ADS Med Surg               acetaminophen (TYLENOL) tablet 650 mg  Dose: 650 mg  Freq: EVERY 4 HOURS PRN Route: PO  PRN Reason: mild pain  Start: 09/29/18 0313   Admin Instructions: Alternate ibuprofen (if  "ordered) with acetaminophen.  Maximum acetaminophen dose from all sources = 75 mg/kg/day not to exceed 4 grams/day.    Admin. Amount: 2 tablet (2 × 325 mg tablet)  Last Admin: 09/29/18 0332  Dispense Loc: [facility] ADS Med Surg         0332 (650 mg)-Given             aspirin EC tablet 81 mg  Dose: 81 mg  Freq: DAILY Route: PO  Start: 09/29/18 0900   Admin. Amount: 1 tablet (1 × 81 mg tablet)  Last Admin: 10/01/18 0912  Dispense Loc: [facility] ADS Med Surg         0913 (81 mg)-Given        1110 (81 mg)-Given        0912 (81 mg)-Given           calcium carbonate (TUMS) chewable tablet 1,000 mg  Dose: 1,000 mg  Freq: 3 TIMES DAILY PRN Route: PO  PRN Reason: heartburn  Start: 09/30/18 2238   Admin. Amount: 2 tablet (2 × 500 mg tablet)  Last Admin: 09/30/18 2244  Dispense Loc: [facility] ADS Med Surg          2244 (1,000 mg)-Given            donepezil (ARIcept) tablet 5 mg  Dose: 5 mg  Freq: AT BEDTIME Route: PO  Start: 09/29/18 2200   Admin. Amount: 1 tablet (1 × 5 mg tablet)  Last Admin: 09/30/18 2035  Dispense Loc: [facility] ADS Med Surg         2150 (5 mg)-Given        2035 (5 mg)-Given        [ ] 2100           lidocaine (LMX4) kit  Freq: EVERY 1 HOUR PRN Route: Top  PRN Reason: pain  PRN Comment: with VAD insertion or accessing implanted port.  Start: 09/29/18 0313   Admin Instructions: Do NOT give if patient has a history of allergy to any local anesthetic or any \"yordy\" product.  Apply 30 minutes prior to VAD insertion or port access. MAX Dose: 2.5 g (  of 5 g tube).    Dispense Loc: [facility] ADS Med Surg               lidocaine 1 % 1 mL  Dose: 1 mL  Freq: EVERY 1 HOUR PRN Route: OTHER  PRN Comment: mild pain with VAD insertion or accessing implanted port  Start: 09/29/18 0313   Admin Instructions: Do NOT give if patient has a history of allergy to any local anesthetic or any \"yordy\" product. MAX dose 1 mL subcutaneous OR intradermal in divided doses.    Admin. Amount: 1 mL  Dispense Loc: [facility] ADS Med Surg  Volume: 2 mL               LORazepam " (ATIVAN) half-tab 0.25-0.5 mg  Dose: 0.25-0.5 mg  Freq: EVERY 4 HOURS PRN Route: PO  PRN Reason: other  PRN Comment: restlessness  Start: 09/29/18 0313   Admin. Amount: 1-2 half-tab (1-2 × 0.25 mg half-tab)  Last Admin: 09/29/18 0332  Dispense Loc: [facility] ADS Med Surg         0332 (0.25 mg)-Given             losartan (COZAAR) tablet 100 mg  Dose: 100 mg  Freq: DAILY Route: PO  Start: 09/29/18 0900   Admin. Amount: 2 tablet (2 × 50 mg tablet)  Last Admin: 10/01/18 0912  Dispense Loc: [facility] ADS Med Surg         0913 (100 mg)-Given        1110 (100 mg)-Given        0912 (100 mg)-Given           methylPREDNISolone sodium succinate (solu-MEDROL) injection 62.5 mg  Dose: 62.5 mg  Freq: EVERY 6 HOURS Route: IV  Start: 09/29/18 0800   Admin Instructions: Give IV Doses 125mg and less IV Push over 2-3 minutes - reconstitute with 2 mL of bacteriostatic water if no diluent is provided. Doses greater than 125 mg need to be in at least 50 mL IVPB.    Admin. Amount: 62.5 mg = 1 mL Conc: 125 mg/2 mL  Last Admin: 10/01/18 0912  Dispense Loc: [facility] ADS Med Surg  Volume: 2 mL         0821 (62.5 mg)-Given       1322 (62.5 mg)-Given       1943 (62.5 mg)-Given        0210 (62.5 mg)-Given       1117 (62.5 mg)-Given       1622 (62.5 mg)-Given       2237 (62.5 mg)-Given        0419 (62.5 mg)-Given       0912 (62.5 mg)-Given       [ ] 1600       [ ] 2200           naloxone (NARCAN) injection 0.1-0.4 mg  Dose: 0.1-0.4 mg  Freq: EVERY 2 MIN PRN Route: IV  PRN Reason: opioid reversal  Start: 09/29/18 0313   Admin Instructions: For respiratory rate LESS than or EQUAL to 8.  Partial reversal dose:  0.1 mg titrated q 2 minutes for Analgesia Side Effects Monitoring Sedation Level of 3 (frequently drowsy, arousable, drifts to sleep during conversation).Full reversal dose:  0.4 mg bolus for Analgesia Side Effects Monitoring Sedation Level of 4 (somnolent, minimal or no response to stimulation).  For ordered IV doses 0.1-2mg give IVP. Give each 0.4mg over 15  seconds in emergency situations. For non-emergent situations further dilute in 9mL of NS to facilitate titration of response.    Admin. Amount: 0.1-0.4 mg = 0.25-1 mL Conc: 0.4 mg/mL  Dispense Loc: [facility] ADS Med Surg  Volume: 1 mL               ondansetron (ZOFRAN-ODT) ODT tab 4 mg  Dose: 4 mg  Freq: EVERY 6 HOURS PRN Route: PO  PRN Reasons: nausea,vomiting  Start: 09/29/18 0313   Admin Instructions: This is Step 1 of nausea and vomiting management.  If nausea not resolved in 15 minutes, go to Step 2 prochlorperazine (COMPAZINE). Do not push through foil backing. Peel back foil and gently remove. Place on tongue immediately. Administration with liquid unnecessary  With dry hands, peel back foil backing and gently remove tablet; do not push oral disintegrating tablet through foil backing; administer immediately on tongue and oral disintegrating tablet dissolves in seconds; then swallow with saliva; liquid not required.    Admin. Amount: 1 tablet (1 × 4 mg tablet)  Dispense Loc: [facility] ADS Med Surg              Or  ondansetron (ZOFRAN) injection 4 mg  Dose: 4 mg  Freq: EVERY 6 HOURS PRN Route: IV  PRN Reasons: nausea,vomiting  Start: 09/29/18 0313   Admin Instructions: This is Step 1 of nausea and vomiting management.  If nausea not resolved in 15 minutes, go to Step 2 prochlorperazine (COMPAZINE).  Irritant. For ordered IV doses 0.1-4 mg, give IV Push undiluted over 2-5 minutes.    Admin. Amount: 4 mg = 2 mL Conc: 4 mg/2 mL  Dispense Loc: [facility] ADS Med Surg  Infused Over: 2-5 Minutes  Volume: 2 mL               oxyCODONE IR (ROXICODONE) tablet 5-10 mg  Dose: 5-10 mg  Freq: EVERY 3 HOURS PRN Route: PO  PRN Reason: other  PRN Comment: pain control or improvement in physical function.  Start: 09/29/18 0313   Admin Instructions: Start with the lowest dose.  May adjust dose by 5 mg every 3 hours as needed for pain control or improvement in physical function.  Hold dose for analgesic side effects.  Notify provider to assess for  uncontrolled pain or analgesic side effects.    Admin. Amount: 1-2 tablet (1-2 × 5 mg tablet)  Dispense Loc: [facility] ADS Med Surg               polyethylene glycol (MIRALAX/GLYCOLAX) Packet 17 g  Dose: 17 g  Freq: DAILY PRN Route: PO  PRN Reason: constipation  Start: 09/29/18 0313   Admin Instructions: Give in 8oz of  water, juice, or soda. Hold for loose stools.  This is the second step of a three step constipation treatment.  1 Packet = 17 grams. Mixed prescribed dose in 8 ounces of water. Follow with 8 oz. of water.    Admin. Amount: 17 g  Dispense Loc: [facility] ADS Med Surg               prochlorperazine (COMPAZINE) injection 5 mg  Dose: 5 mg  Freq: EVERY 6 HOURS PRN Route: IV  PRN Reasons: nausea,vomiting  Start: 09/29/18 0313   Admin Instructions: This is Step 2 of nausea and vomiting management. Give if nausea not resolved 15 minutes after giving ondansetron (ZOFRAN). If nausea not resolved in 15 minutes, go to Step 3 metoclopramide (REGLAN), if ordered.  For ordered IV doses 0.1-10 mg, give IV Push undiluted. Each 5mg over 1 minute.    Admin. Amount: 5 mg = 1 mL Conc: 5 mg/mL  Last Admin: 09/30/18 1112  Dispense Loc: [facility] ADS Med Surg  Infused Over: 1-2 Minutes  Volume: 1 mL          1112 (5 mg)-Given           Or  prochlorperazine (COMPAZINE) tablet 5 mg  Dose: 5 mg  Freq: EVERY 6 HOURS PRN Route: PO  PRN Reason: vomiting  Start: 09/29/18 0313   Admin Instructions: This is Step 2 of nausea and vomiting management. Give if nausea not resolved 15 minutes after giving ondansetron (ZOFRAN). If nausea not resolved in 15 minutes, go to Step 3 metoclopramide (REGLAN), if ordered.    Admin. Amount: 1 tablet (1 × 5 mg tablet)  Dispense Loc: [facility] ADS Med Surg                     Or  prochlorperazine (COMPAZINE) Suppository 12.5 mg  Dose: 12.5 mg  Freq: EVERY 12 HOURS PRN Route: RE  PRN Reasons: nausea,vomiting  Start: 09/29/18 0313   Admin Instructions: This is Step 2 of nausea and vomiting management. Give if nausea not  resolved 15 minutes after giving ondansetron (ZOFRAN). If nausea not resolved in 15 minutes, go to Step 3 metoclopramide (REGLAN), if ordered.    Admin. Amount: 0.5 suppository (0.5 × 25 mg suppository)  Dispense Loc: Contact Rx for dose                      senna-docusate (SENOKOT-S;PERICOLACE) 8.6-50 MG per tablet 1 tablet  Dose: 1 tablet  Freq: 2 TIMES DAILY PRN Route: PO  PRN Reason: constipation  Start: 09/29/18 0313   Admin Instructions: If no bowel movement in 24 hours, increase to 2 tablets PO.  Hold for loose stools.  This is the first step of a three step constipation treatment.    Admin. Amount: 1 tablet  Dispense Loc: [facility] ADS Med Surg              Or  senna-docusate (SENOKOT-S;PERICOLACE) 8.6-50 MG per tablet 2 tablet  Dose: 2 tablet  Freq: 2 TIMES DAILY PRN Route: PO  PRN Reason: constipation  Start: 09/29/18 0313   Admin Instructions: Hold for loose stools.  This is the first step of a three step constipation treatment.    Admin. Amount: 2 tablet  Dispense Loc: [facility] ADS Med Surg               sodium chloride (PF) 0.9% PF flush 3 mL  Dose: 3 mL  Freq: EVERY 8 HOURS Route: IK  Start: 09/29/18 0315   Admin Instructions: And Q1H PRN, to lock peripheral IV dormant line.    Admin. Amount: 3 mL  Last Admin: 09/29/18 1133  Dispense Loc: [facility] Floor Stock  Volume: 3 mL         (0333)-Not Given       1133 (3 mL)-Given       (1815)-Not Given        (0438)-Not Given       (1021)-Not Given       (1840)-Not Given        (0422)-Not Given       (1234)-Not Given       [ ] 1915           sodium chloride (PF) 0.9% PF flush 3 mL  Dose: 3 mL  Freq: EVERY 1 HOUR PRN Route: IK  PRN Reason: line flush  Start: 09/29/18 0313   Admin Instructions: for peripheral IV flush post IV meds    Admin. Amount: 3 mL  Dispense Loc: [facility] Floor Stock  Volume: 3 mL               sodium chloride 0.9% infusion  Rate: 125 mL/hr Dose: 1000 mL  Freq: CONTINUOUS Route: IV  Last Dose: Stopped (10/01/18 1154)  Start: 09/28/18 2332   Admin Instructions:  Administer after the bolus.    Admin. Amount: 1,000 mL  Last Admin: 10/01/18 0419  Dispense Loc: [facility] Floor Stock  Volume: 1,000 mL         0124 (1,000 mL)-New Bag       1539 (1,000 mL)-Rate/Dose Verify       1811 (1,000 mL)-New Bag        0211 (1,000 mL)-New Bag       1247 (1,000 mL)-New Bag       2035 (1,000 mL)-New Bag        0419 (1,000 mL)-New Bag       1154-Stopped          Completed Medications  Medications 09/25/18 09/26/18 09/27/18 09/28/18 09/29/18 09/30/18 10/01/18         Dose: 1,000 mL  Freq: ONCE Route: IV  Last Dose: Stopped (18)  Start: 18   End: 18   Admin. Amount: 1,000 mL  Last Admin: 18  Dispense Loc: [facility] Floor Stock  Infused Over: 1 Hours  Administrations Remainin  Volume: 1,000 mL        2342 (1,000 mL)-New Bag        0123-Stopped               Dose: 10 mL  Freq: ONCE Route: IV  Start: 1845   End: 18 1011   Admin Instructions: Supplied by, and administered by [facility].    Admin. Amount: 10 mL  Last Admin: 18 1011  Dispense Loc: [facility] Floor Stock  Administrations Remainin  Volume: 10 mL          1011 (10 mL)-Given              Dose: 0.5 mL  Freq: PRIOR TO DISCHARGE Route: IM  Start: 18 1000   End: 10/01/18 0939   Admin Instructions: Administer when afebrile (less than 100.4F) x 24 hours, or Prior to Discharge.  If not administering when scheduled , change the due time by following the instructions in the reference link below.  If patient refuses vaccine, chart as Vaccine Refused.    Admin. Amount: 0.5 mL  Last Admin: 10/01/18 0939  Dispense Loc: [facility]  Administrations Remainin  Volume: 0.5 mL           0939 (0.5 mL)-Given             Dose: 3 mL  Freq: ONCE Route: NEBULIZATION  Start: 18   End: 09/29/18 0044   Admin Instructions: For shortness of breath    Admin. Amount: 3 mL  Last Admin: 18  Dispense Loc: [facility] ED  Administrations Remainin  Volume: 3 mL         44 (3  mL)-Given               Dose: 125 mg  Freq: ONCE Route: IV  Start: 18   End: 18   Admin Instructions: Give IV Doses 125mg and less IV Push over 2-3 minutes - reconstitute with 2 mL of bacteriostatic water if no diluent is provided. Doses greater than 125 mg need to be in at least 50 mL IVPB.    Admin. Amount: 125 mg = 2 mL Conc: 125 mg/2 mL  Last Admin: 18  Dispense Loc: [facility] ED  Administrations Remainin  Volume: 2 mL         020 (125 mg)-Given          Medications 09/25/18 09/26/18 09/27/18 09/28/18 09/29/18 09/30/18 10/01/18               INTERAGENCY TRANSFER FORM - NOTES (H&P, Discharge Summary, Consults, Procedures, Therapies)   2018                       [address] SURGICAL: [phone]               History & Physicals      H&P by Nickolas Boggs MD at 2018  2:10 AM     Author:  Nickolas Boggs MD Service:  Hospitalist Author Type:  Physician    Filed:  2018  2:26 AM Date of Service:  2018  2:10 AM Creation Time:  2018  2:19 AM    Status:  Signed :  Nickolas Boggs MD (Physician)         [facility]    History and Physical  Hospitalist       Date of Admission:  2018[WG1.1]    Assessment & Plan[WG1.2]   Duane Tegg is a 73 year old male who presents with increasing weakness over the past 2 days.  He lives alone and currently is not able to safely stand by himself and care for self.  Some of the symptoms have been associated with upper respiratory symptoms for the past 2 days with a dry cough but more concerning is persistent low back pain with a history of severe lumbar stenosis.  He has had outpatient physical therapy with some improvement and steroid injections, but over the past 2 days the pain is worsened with increased weakness in his legs.  In the emergency department he was afebrile with normal vital signs, slightly tachycardic with normal CBC, normal BMP,  normal lactate with an elevated CRP.  Lumbar CT scan showing severe lumbar stenosis.  Case was discussed with neurosurgery on-call, Dr. Sydney Bojroquez, who is recommending IV steroids, PT/OT evaluation and MRI.  She told ER, that she will call back later today to reassess and determine whether he should be potentially transferred to [facility] for possible lumbar back surgery.  If the back is not the issue, it may be that he is not safe to go home, and may need TCU placement.  This point patient will be registered observation for further evaluation including MRI later today[WG1.1]    Weakness of both lower extremities[WG1.3]  Worsening over the past 2 days now to the point that he cannot stand safely by himself  Concern is that this could be secondary to severe lumbar stenosis, exacerbated by current URI   Per recommendation of neurosurgery, will bring in, has been given IV steroids and will complete MRI in a.m.  PT/OT evaluation has been ordered  Neurosurgery to call in later today to reassess and determine whether he should be transferred for consideration of possible back surgery[WG1.1]    Spinal stenosis of lumbar region with neurogenic claudication[WG1.3]  Noted previously on MRI having finished a course of PT and received spinal steroid injections  Worsening symptoms with increased weakness of the legs over the past 2 days  CT showing severe lumbar stenosis  Per recommendation of neurosurgery, will be given IV steroids and will order MRI for evaluation later to this morning  Neurosurgery to call back regarding next steps[WG1.1]    Essential hypertension[WG1.3]  Controlled at home taking losartan  Continue[WG1.1]    Viral upper respiratory illness[WG1.3]  Several day history of dry cough with low-grade temps and increased fatigue  Lab work today with normal CBC and chest x-ray without any infiltrate  Symptomatic cares[WG1.1]    Dementia without behavioral disturbance, unspecified dementia type[WG1.3]  Lives at  home alone with family stopping by  Increased confusion over the past several days, not currently safe to be at home alone  PT/OT evaluation in a.m.[WG1.1]    Hyperlipidemia LDL goal <130[WG1.3]  Controlled at home taking Lipitor  Continue at discharge      DVT Prophylaxis: Observation status  Code Status: Full Code[WG1.1]  Disposition Plan[WG1.2]   Expected discharge: Tomorrow, recommended to prior living arrangement once adequate pain management/ tolerating PO medications, safe disposition plan/ TCU bed available and MRI completed with neurosurgery recommendations done.     Entered:[WG1.1] Nickolas Boggs MD 09/29/2018[WG1.2],[WG1.1] 2:20 AM[WG1.2]   Information in the above section will display in the discharge planner report.[WG1.1]      Nickolas Boggs MD    Primary Care Physician   Heike Antonio    Chief Complaint[WG1.2]   73-year-old male with increasing weakness    History is obtained from the patient, electronic health record, emergency department physician and patient's family, including 2 sons and a daughter-in-law[WG1.1]    History of Present Illness[WG1.2]   Duane Tegg is a 73 year old male who presents with increasing weakness.  He lives at home alone with a history of mild dementia, getting by with family stopping by.  For the past 2 days he has been more weak with a  dry cough and fatigue, not eating well.  Family notes he is been more confused and that he is not been able to get up by himself.  Patient has a history of spinal stenosis, followed by neurosurgery.  MRI earlier this year showed severe spinal stenosis and this was followed up with spinal injections and PT therapy.  He seemed to improve somewhat.  He notes now that his pain in his lower back is worse, possibly exacerbated by the coughing.  He denies any falls although he so weak he feels like he might fall.  He said chills, denies fever.  He said decreased appetite with some nausea but no vomiting, no diarrhea and no urinary  symptoms other than he is been potentially having more incontinence than baseline.[WG1.1]    Past Medical History[WG1.2]    I have reviewed this patient's medical history and updated it with pertinent information if needed.[WG1.1]   History reviewed. No pertinent past medical history.[WG1.4]    Past Surgical History[WG1.2]   I have reviewed this patient's surgical history and updated it with pertinent information if needed.[WG1.1]  Past Surgical History:   Procedure Laterality Date     COLONOSCOPY N/A 1/6/2016    Procedure: COMBINED COLONOSCOPY, SINGLE OR MULTIPLE BIOPSY/POLYPECTOMY BY BIOPSY;  Surgeon: Ryan Nicholas MD;  Location: [facility][WG1.4]       Prior to Admission Medications   Prior to Admission Medications   Prescriptions Last Dose Informant Patient Reported? Taking?   aspirin 81 MG tablet Unknown at Unknown time  No No   Sig: Take 1 tablet (81 mg) by mouth daily   atorvastatin (LIPITOR) 20 MG tablet Past Week at Unknown time  No Yes   Sig: TAKE 1 TABLET(20 MG) BY MOUTH DAILY   diazepam (VALIUM) 10 MG tablet   No No   Sig: Take 30-60 minutes before procedure.  Do not operate a vehicle after taking this medication.   donepezil (ARICEPT) 5 MG tablet Past Week at Unknown time  No Yes   Sig: TAKE 1 TABLET(5 MG) BY MOUTH AT BEDTIME   losartan (COZAAR) 100 MG tablet Past Week at Unknown time  No Yes   Sig: TAKE 1 TABLET(100 MG) BY MOUTH DAILY   order for DME   No No   Sig: Equipment being ordered: blood pressure machine and cuff   Patient not taking: Reported on 7/12/2018      Facility-Administered Medications: None     Allergies   No Known Allergies    Social History[WG1.2]   I have reviewed this patient's social history and updated it with pertinent information if needed. Duane Tegg[WG1.1]  reports that he has quit smoking. He has never used smokeless tobacco. He reports that he drinks alcohol. He reports that he does not use illicit drugs.[WG1.5]    Family History[WG1.2]   I have reviewed this patient's family  history and updated it with pertinent information if needed.[WG1.1]   Family History   Problem Relation Age of Onset     Diabetes Father[WG1.5]        Review of Systems[WG1.2]   The 10 point Review of Systems is negative other than noted in the HPI or here.[WG1.1]     Physical Exam   Temp: 98.7  F (37.1  C)   BP: 120/70 Pulse: 92   Resp: 20 SpO2: 91 % O2 Device: None (Room air)[WG1.2]    Vital Signs with Ranges[WG1.1]  Temp:  [98.7  F (37.1  C)] 98.7  F (37.1  C)  Pulse:  [] 92  Resp:  [20] 20  BP: (119-165)/(63-86) 120/70  SpO2:  [91 %-95 %] 91 %  0 lbs 0 oz[WG1.2]    Constitutional: Appropriate age male, lying on the gurney.  He seems somewhat impulsive and a little disoriented  Eyes: Pupils equal, reactive, EOM intact  HEENT: Nose mouth throat unremarkable  Respiratory: Lungs are clear  Cardiovascular: Regular rate and rhythm, no murmur heard  GI: Belly soft, nontender  Lymph/Hematologic: Cervical nodes negative  Genitourinary: Not examined  Skin: No lesions or rashes  Musculoskeletal: Moving arms and legs without difficulty.  No deformity.  Tender across the low lumbar area with palpation over the spinal processes.  Did not attempt to get up and put through range of motion.  Neurologic: No signs of focal deficits.  Cranial nerves are normal.  Psychiatric: He is alert oriented to place but not time.  He seems impulsive and needs to be redirected to stay in bed[WG1.1]    Data[WG1.2]   Data reviewed today:  I personally reviewed the chest x-ray image(s) showing No signs of infiltrate, maybe some atelectasis at the right base.[WG1.1]    Recent Labs  Lab 09/28/18  2321   WBC 7.9   HGB 12.9*   MCV 89   *      POTASSIUM 3.9   CHLORIDE 101   CO2 25   BUN 20   CR 0.91   ANIONGAP 8   GERI 8.2*   *   ALBUMIN 3.2*   PROTTOTAL 7.2   BILITOTAL 0.9   ALKPHOS 107   ALT 52   AST 40   LIPASE 94       Recent Results (from the past 24 hour(s))   XR Chest 2 Views    Narrative    CHEST 2 VIEWS  9/29/2018  12:39 AM     HISTORY: Cough. Weakness.    COMPARISON: None.    FINDINGS: A linear opacity in the right lung base most likely  represents atelectasis or scarring. The lungs are otherwise clear.  Normal-sized cardiac silhouette. Atherosclerotic calcification in the  thoracic aorta.      Impression    IMPRESSION: No convincing evidence of active cardiopulmonary disease.[WG1.2]          Revision History        User Key Date/Time User Provider Type Action    > WG1.5 9/29/2018  2:26 AM Nickolas Boggs MD Physician Sign     WG1.4 9/29/2018  2:24 AM Nickolas Boggs MD Physician      WG1.3 9/29/2018  2:22 AM Nickolas Boggs MD Physician      WG1.2 9/29/2018  2:20 AM Nickolas Boggs MD Physician      WG1.1 9/29/2018  2:19 AM Nickolas Boggs MD Physician                      Discharge Summaries      Discharge Summaries by Nickolas Boggs MD at 10/1/2018 11:40 AM     Author:  Nickolas Boggs MD Service:  Hospitalist Author Type:  Physician    Filed:  10/1/2018 12:09 PM Date of Service:  10/1/2018 11:40 AM Creation Time:  10/1/2018 11:53 AM    Status:  Signed :  Nickolas Boggs MD (Physician)         [facility]    Discharge Summary  Hospitalist    Date of Admission:  9/28/2018  Date of Discharge:[WG1.1]  10/1/2018[WG1.2]  Discharging Provider:[WG1.1] Nickolas Boggs MD[WG1.2]  Date of Service (when I saw the patient):[WG1.1] 10/01/18    Discharge Diagnoses   Principal Problem:    Weakness of both lower extremities  Active Problems:    Essential hypertension    Dementia without behavioral disturbance, unspecified dementia type    Spinal stenosis of lumbar region with neurogenic claudication    Viral upper respiratory illness    Hyperlipidemia LDL goal <130        History of Present Illness[WG1.2]   Copied from H&P:  Duane Tegg is a 73 year old male who presents with increasing weakness.  He lives at home alone with a history of mild  dementia, getting by with family stopping by.  For the past 2 days he has been more weak with a  dry cough and fatigue, not eating well.  Family notes he is been more confused and that he is not been able to get up by himself.  Patient has a history of spinal stenosis, followed by neurosurgery.  MRI earlier this year showed severe spinal stenosis and this was followed up with spinal injections and PT therapy.  He seemed to improve somewhat.  He notes now that his pain in his lower back is worse, possibly exacerbated by the coughing.  He denies any falls although he so weak he feels like he might fall.  He said chills, denies fever.  He said decreased appetite with some nausea but no vomiting, no diarrhea and no urinary symptoms other than he is been potentially having more incontinence than baseline.[WG1.1]      Hospital Course[WG1.2]   Duane Tegg was admitted on 9/28/2018.  The following problems were addressed during his hospitalization:[WG1.1]    Principal Problem:    Weakness of both lower extremities[WG1.3]   Assessment: Etiology as MRI of the lumbar back is unremarkable for worsening stenosis.  MR I of the head and MRA of the head and neck showed no stroke or vessel obstruction or narrowing.  Vestibular examination by physical therapy has not shown an inner ear issue.  Patient did have an upper respiratory illness that began 3-4 days ago with known underlying dementia and an eye surgery 3 years ago which has made patient have difficulty with depth perception.  Did discuss with patient and his family that at this time no reversible etiology can be identified and it may be a combination of all the above which has allowed this to happen.  At this time patient is unsafe to return home alone and patient and his family are wanting TCU placement for ongoing physical therapy and initiation of occupational therapy services.  Patient be discharged to TCU at [facility].[WG1.1]  Active Problems:     Essential hypertension[WG1.3]   Discussed with patient and family that improved blood pressure control in the future may be appropriate but given his balance issues at this time, will continue home regimen of losartan 100 mg daily and monitor.  If blood pressure become elevated above 160s systolic, would consider addition of a second agent.  Ongoing monitoring at TCU[WG1.1]      Dementia without behavioral disturbance, unspecified dementia type[WG1.3]   present at baseline with family very supportive, patient currently lives home alone with family checking on him daily (by phone or in person) with increasing confusion for the 3 days prior to hospitalization but work up negative as above    Plan: Will discharge to TCU and monitor cognitive status and improvement.  Did discuss options and goals for long term management as patient's memory continues to decline.  Family is interested in learning more about assistive living facilities and will work with the TCU staff to explore options.[WG1.1]       Spinal stenosis of lumbar region with neurogenic claudication[WG1.3]   chronic and stable on MRI performed during this hospitalization , had been treated initially with IV steroids and will transition oral steroids for 5 more days after discharge[WG1.1]      Viral upper respiratory illness[WG1.3]   noted for 2-3 days prior to coming to the ED with patient having one fever of 101.3 early in this observation stay but work up negative and patient having no recurrence, no signs of respiratory worsening[WG1.1]      Hyperlipidemia LDL goal <130[WG1.3]   on lipitor at home.,  Will continue at discharge[WG1.1]        Nickolas Boggs MD    Significant Results and Procedures[WG1.2]    No change on MRI in lumbar spine[WG1.1]    Pending Results[WG1.2]   These results will be followed up by Dr. Antonio[WG1.1]  Unresulted Labs Ordered in the Past 30 Days of this Admission     Date and Time Order Name Status Description    9/29/2018  1010 Blood culture Preliminary     9/29/2018 1010 Blood culture Preliminary           Code Status[WG1.2]   Full Code[WG1.1]       Primary Care Physician   Heike Antonio[WG1.2]    Physical Exam   Temp: 96.2  F (35.7  C) Temp src: Oral BP: 142/69 Pulse: 55 Heart Rate: 55 Resp: 20 SpO2: 94 % O2 Device: None (Room air)    Vitals:    09/29/18 0315   Weight: 91.7 kg (202 lb 2.6 oz)[WG1.4]     Vital Signs with Ranges[WG1.1]  Temp:  [96  F (35.6  C)-96.6  F (35.9  C)] 96.2  F (35.7  C)  Pulse:  [55-75] 55  Heart Rate:  [55-75] 55  Resp:  [18-20] 20  BP: (142-162)/(69-81) 142/69  SpO2:  [94 %] 94 %  I/O last 3 completed shifts:  In: 2293 [P.O.:500; I.V.:1793]  Out: -[WG1.4]     Constitutional: awake, alert, cooperative, no apparent distress, and appears stated age  Respiratory: No increased work of breathing, good air exchange, clear to auscultation bilaterally, no crackles or wheezing  Cardiovascular: Normal apical impulse, regular rate and rhythm, normal S1 and S2, no S3 or S4, and no murmur noted  Neuropsychiatric: Pleasant but is mildly confused[WG1.1]    Discharge Disposition[WG1.2]   Discharged to rehabilitation facility  Condition at discharge: Good[WG1.1]    Consultations This Hospital Stay   SOCIAL WORK IP CONSULT  PHYSICAL THERAPY ADULT IP CONSULT  OCCUPATIONAL THERAPY ADULT IP CONSULT  PHYSICAL THERAPY ADULT IP CONSULT  OCCUPATIONAL THERAPY ADULT IP CONSULT    Time Spent on this Encounter[WG1.2]   INickolas, personally saw the patient today and spent less than or equal to 30 minutes discharging this patient.[WG1.1]    Discharge Orders     General info for SNF   Length of Stay Estimate: Short Term Care: Estimated # of Days <30  Condition at Discharge: Improving  Level of care:skilled   Rehabilitation Potential: Good  Admission H&P remains valid and up-to-date: Yes  Recent Chemotherapy: N/A  Use Nursing Home Standing Orders: Yes     Mantoux instructions   Give two-step Mantoux (PPD) Per Facility  Policy Yes     Follow Up and recommended labs and tests   Follow up with Nursing home physician.  No follow up labs or test are needed.     Activity - Up with assistive device     Full Code     Physical Therapy Adult Consult   Evaluate and treat as clinically indicated.    Reason:  Leg weakness     Occupational Therapy Adult Consult   Evaluate and treat as clinically indicated.    Reason:  Dementia with poor memory and impulsiveness       Discharge Medications   Current Discharge Medication List      START taking these medications    Details   predniSONE (DELTASONE) 20 MG tablet Take 1 tablet (20 mg) by mouth daily for 5 days  Qty: 5 tablet, Refills: 0    Associated Diagnoses: Spinal stenosis at L4-L5 level         CONTINUE these medications which have CHANGED    Details   aspirin 81 MG tablet Take 1 tablet (81 mg) by mouth daily  Qty: 90 tablet, Refills: 3    Associated Diagnoses: Benign essential hypertension      atorvastatin (LIPITOR) 20 MG tablet TAKE 1 TABLET(20 MG) BY MOUTH DAILY  Qty: 90 tablet, Refills: 0    Associated Diagnoses: Hyperlipidemia LDL goal <100      donepezil (ARICEPT) 5 MG tablet TAKE 1 TABLET(5 MG) BY MOUTH AT BEDTIME  Qty: 90 tablet, Refills: 1    Associated Diagnoses: Dementia without behavioral disturbance, unspecified dementia type      losartan (COZAAR) 100 MG tablet TAKE 1 TABLET(100 MG) BY MOUTH DAILY  Qty: 90 tablet, Refills: 0    Associated Diagnoses: Benign essential hypertension         CONTINUE these medications which have NOT CHANGED    Details   order for DME Equipment being ordered: blood pressure machine and cuff  Qty: 1 each, Refills: 0    Associated Diagnoses: Essential hypertension         STOP taking these medications       diazepam (VALIUM) 10 MG tablet Comments:   Reason for Stopping:             Allergies   No Known Allergies  Data[WG1.2]   Most Recent 3 CBC's:[WG1.1]  Recent Labs   Lab Test  09/29/18   1036  09/28/18   2321  12/26/16   1145   WBC  4.8  7.9  5.8   HGB   12.4*  12.9*  14.6   MCV  89  89  91   PLT  140*  146*  200[WG1.5]      Most Recent 3 BMP's:[WG1.1]  Recent Labs   Lab Test  09/29/18   1036  09/28/18   2321 03/10/17  12/26/16   1145   NA  141  134   --   142   POTASSIUM  4.1  3.9  4.6  4.2   CHLORIDE  108  101   --   107   CO2  24  25   --   23   BUN  16  20   --   17   CR  0.79  0.91  0.9  0.78   ANIONGAP  9  8   --   12   GERI  8.4*  8.2*   --   8.7   GLC  200*  113*  94  100*[WG1.5]     Most Recent 2 LFT's:[WG1.1]  Recent Labs   Lab Test  09/28/18   2321 03/10/17  12/26/16   1145   AST  40  24  14   ALT  52  29  29   ALKPHOS  107   --   74   BILITOTAL  0.9   --   0.8[WG1.5]     Most Recent INR's and Anticoagulation Dosing History:  Anticoagulation Dose History     There is no flowsheet data to display.        Most Recent 3 Troponin's:[WG1.1]No lab results found.[WG1.5]  Most Recent Cholesterol Panel:[WG1.1]  Recent Labs   Lab Test 03/10/17  01/08/16   0945   CHOL  131  172   LDL   --   105*   HDL  40  35*   TRIG  130  162*[WG1.5]     Most Recent 6 Bacteria Isolates From Any Culture (See EPIC Reports for Culture Details):[WG1.1]  Recent Labs   Lab Test  09/29/18   1035  09/29/18   1031   CULT  No growth after 2 days  No growth after 2 days[WG1.5]     Most Recent TSH, T4 and A1c Labs:[WG1.1]  Recent Labs   Lab Test  12/26/16   1145   TSH  1.14     Results for orders placed or performed during the hospital encounter of 09/28/18   XR Chest 2 Views    Narrative    CHEST 2 VIEWS  9/29/2018 12:39 AM     HISTORY: Cough. Weakness.    COMPARISON: None.    FINDINGS: A linear opacity in the right lung base most likely  represents atelectasis or scarring. The lungs are otherwise clear.  Normal-sized cardiac silhouette. Atherosclerotic calcification in the  thoracic aorta.      Impression    IMPRESSION: No convincing evidence of active cardiopulmonary disease.    JUANA GARCIA MD   Lumbar spine CT w/o contrast    Narrative    CT OF THE LUMBAR SPINE WITHOUT CONTRAST   9/29/2018 12:30 AM     COMPARISON: Lumbar spine MRI 7/12/2018    HISTORY:  Acute bilateral leg weakness, question of spinal stenosis.      TECHNIQUE: Axial images of the lumbar spine were acquired without  intravenous contrast. Multiplanar reformations were created from the  axial source images.      FINDINGS:  Grade 1 degenerative anterolisthesis of L4 upon L5 again  noted without change. Alignment of the lumbar vertebrae is otherwise  normal. Vertebral body heights of the lumbar spine remain normal.  There is no evidence for fracture of the lumbar spine. There is  moderate-severe degenerative facet arthropathy bilaterally at the  L3-L4, L4-L5 and L5-S1 levels. Posterior disc bulges at the L3-L4,  L4-L5 and L5-S1 levels are again noted without change. Moderate-severe  spinal canal narrowing at the L4-L5 level persists without change.  There is no other significant degenerative spinal canal narrowing of  the lumbar spine.      Impression    IMPRESSION: Degenerative changes of the lumbar spine as described  above without significant change from the comparison MRI.    Radiation dose for this scan was reduced using automated exposure  control, adjustment of the mA and/or kV according to patient size, or  iterative reconstruction technique.    KACIE ERWIN MD   MR Lumbar Spine w/o Contrast    Narrative    MRI OF THE LUMBAR SPINE WITHOUT CONTRAST  9/29/2018 8:50 AM     COMPARISON: Lumbar spine MRI 7/12/2018    HISTORY:  Increasing weakness in legs.     TECHNIQUE: Multiplanar, multisequence MRI images of the lumbar spine  were acquired without IV contrast.    FINDINGS: There are five lumbar-type vertebrae for the purposes of  this dictation.     There is continued normal alignment of the lumbar vertebrae. Vertebral  body heights of the lumbar spine remain normal.  Marrow signal  throughout the lumbar vertebrae remains normal. There is no evidence  for fracture or pathologic bony lesion of the lumbar spine.    There is  loss of disc height, disc desiccation and posterior disc  bulging at the L3-L4, L4-L5 and L5-S1 levels. The lumbar  intervertebral discs are otherwise within normal limits in height,  contour and signal intensity. There is a rudimentary disc at the S1-S2  level that remains normal in appearance.    The tip of the conus medullaris is at the lower L1 level which is  within normal limits. There is no evidence for intrathecal  abnormality.     Level by level:     L1-L2: There is no spinal canal or neural foraminal stenosis at this  level. No change from the comparison study.    L2-L3: There is mild facet arthropathy bilaterally. There is no spinal  canal or neural foraminal stenosis at this level. No change from the  comparison study.    L3-L4: There is a minimal circumferential disc bulge and mild facet  arthropathy bilaterally. These findings result in minimal right  foraminal narrowing but no spinal canal or left foraminal stenosis. No  change from the comparison study.    L4-L5: There is a circumferential disc bulge, thickening of the  ligamentum flavum bilaterally and severe facet arthropathy  bilaterally. These findings result in moderate-severe spinal canal  stenosis, moderate left foraminal stenosis and mild right foraminal  narrowing. No change from the comparison study.    L5-S1: There is a circumferential disc bulge and severe facet  arthropathy bilaterally. These findings result in severe left  foraminal stenosis, moderate right foraminal stenosis and mild spinal  canal narrowing. No change from the comparison study.      Impression    IMPRESSION: Degenerative changes of the lumbar spine as described  above without significant change from the comparison study.    KACIE ERWIN MD   CT Head w/o contrast*    Narrative    CT OF THE HEAD WITHOUT CONTRAST 9/29/2018 3:55 PM     COMPARISON: None.    HISTORY: Increased confusion and balance issues, left leg weakness all  starting approximately 3 days  ago.    TECHNIQUE: 5 mm thick axial CT images of the head were acquired  without IV contrast material.    FINDINGS: There is moderate diffuse cerebral volume loss. There are  subtle patchy areas of decreased density in the cerebral white matter  bilaterally that are consistent with sequela of chronic small vessel  ischemic disease.    The ventricles and basal cisterns are within normal limits in  configuration given the degree of cerebral volume loss.  There is no  midline shift. There are no extra-axial fluid collections.    No intracranial hemorrhage, mass or recent infarct.    The visualized paranasal sinuses are well-aerated. There is no  mastoiditis. There are no fractures of the visualized bones.      Impression    IMPRESSION: Diffuse cerebral volume loss and cerebral white matter  changes consistent with chronic small vessel ischemic disease. No  evidence for acute intracranial pathology.    Radiation dose for this scan was reduced using automated exposure  control, adjustment of the mA and/or kV according to patient size, or  iterative reconstruction technique.     AKCIE ERWIN MD   MR Brain w/o Contrast    Narrative    MRI OF THE BRAIN WITHOUT AND WITH CONTRAST 9/30/2018 10:12 AM     COMPARISON: Head CT 9/29/2018.    HISTORY:  Increasing confusion, balance impairment, left leg weakness  starting 3 days ago.      TECHNIQUE: Axial diffusion-weighted with ADC map, axial T2-weighted  with fat saturation, axial T1-weighted, axial turboFLAIR and coronal  T1-weighted images of the brain were acquired without intravenous  contrast.  Following intravenous administration of gadolinium (10 mL  Gadavist), axial T1-weighted images of the brain were acquired.     FINDINGS: There is moderate diffuse cerebral volume loss. There are  multiple tiny scattered focal areas of abnormal T2 signal  hyperintensity in the cerebral white matter bilaterally that are  consistent with sequela of chronic small vessel ischemic  disease.    The ventricles and basal cisterns are within normal limits in  configuration given the degree of cerebral volume loss.  There is no  midline shift.  There are no extra-axial fluid collections.  There is  no evidence for stroke or acute intracranial hemorrhage.  There is no  abnormal contrast enhancement in the brain or its coverings.    There is no sinusitis or mastoiditis.      Impression    IMPRESSION:  Diffuse cerebral volume loss and cerebral white matter  changes consistent with chronic small vessel ischemic disease. No  evidence for acute intracranial pathology.    KACIE ERWIN MD   MRA Brain ([place]) wo Contrast    Narrative    MR ANGIOGRAM OF THE HEAD WITHOUT CONTRAST   9/30/2018 10:12 AM     COMPARISON: None.    HISTORY: Increasing confusion, balance impairment, left leg weakness  starting 3 days ago.     TECHNIQUE:  3D time-of-flight MR angiogram of the head without  contrast. MIP reconstruction of all MR angiographic data was  performed.    FINDINGS:  The P1 segment of the left posterior cerebral artery is not  visualized and is likely hypoplastic or congenitally absent. The left  posterior cerebral artery is supplied by the patent left posterior  communicating artery. The bilateral distal internal carotid, basilar,  bilateral anterior cerebral, bilateral middle cerebral and bilateral  posterior cerebral arteries are patent and unremarkable with no  evidence for cerebral artery stenosis or aneurysm. The anterior  communicating artery is patent and unremarkable. The posterior  communicating artery on the left is patent and unremarkable.       Impression    IMPRESSION:  Normal MR angiogram of the head.      KACIE ERWIN MD   MRA Neck (Carotids) wo & w Contrast    Narrative    MRA NECK WITHOUT AND WITH CONTRAST  9/30/2018 10:13 AM     COMPARISON: None.    HISTORY:   Increasing confusion, balance impairment, left leg weakness  starting 3 days ago.     TECHNIQUE: 2D time-of-flight MR  angiogram of the neck without contrast  and 3D MR angiogram of the neck with 10 mL Gadavist gadolinium IV  contrast.  MIP reconstruction of all MR angiographic data was  performed. Estimates of carotid stenoses are made relative to the  distal internal carotid artery diameters except as noted.    FINDINGS:    Carotids: The common carotid arteries bilaterally are widely patent.  There is mild atherosclerotic narrowing (less than 50% luminal  diameter stenosis) at the origin of the right internal carotid artery.  The internal carotid arteries bilaterally are otherwise patent and  unremarkable.    Vertebrals: The origins of the vertebral arteries bilaterally are  obscured by motion artifact. The visualized portions of the vertebral  arteries bilaterally are patent without stenosis.    Aortic arch: The arteries as they arise from the aortic arch are  normally arranged with no evidence for stenosis.      Impression    IMPRESSION:    Mild atherosclerotic narrowing of the origin of the  right internal carotid artery. Nonvisualization of the origins of the  vertebral arteries bilaterally. Otherwise, normal neck MRA.    KACIE ERWIN MD[WG1.5]          Revision History        User Key Date/Time User Provider Type Action    > WG1.5 10/1/2018 12:09 PM Nickolas Boggs MD Physician Sign     WG1.4 10/1/2018 11:59 AM Nickolas Boggs MD Physician      WG1.3 10/1/2018 11:55 AM Nickolas Boggs MD Physician      WG1.2 10/1/2018 11:54 AM Nickolas Boggs MD Physician      WG1.1 10/1/2018 11:53 AM Nickolas Boggs MD Physician                      Consult Notes      Consults by Narda Lyons RN at 9/30/2018 10:50 AM     Author:  Narda Lyons RN Service:  (none) Author Type:      Filed:  9/30/2018 11:05 AM Date of Service:  9/30/2018 10:50 AM Creation Time:  9/30/2018 10:49 AM    Status:  Signed :  Narda Lyons RN ()     Consult Orders:    1. Social Work IP Consult  [[id number]] ordered by Nickolas Boggs MD at 09/29/18 0216                Care Transition Initial Assessment - RN    Reason For Consult: discharge planning   Met with: Patient and Family.  DATA   Principal Problem:    Weakness of both lower extremities  Active Problems:    Essential hypertension    Dementia without behavioral disturbance, unspecified dementia type    Spinal stenosis of lumbar region with neurogenic claudication    Viral upper respiratory illness    Hyperlipidemia LDL goal <130    Leg weakness, bilateral       Cognitive Status: awake, alert and oriented.  Primary [facility] Clinic [facility]  Primary Care MD Name: Dr. Heike Antonio  Contact information and PCP information verified: Yes  Lives With: alone  Living Arrangements: house (town home)     Description of Support System: Supportive   Who is your support system?: Children   Support Assessment: Adequate family and caregiver support   Insurance concerns: No Insurance issues identified  ASSESSMENT  Patient currently receives the following services: none        Identified issues/concerns regarding health management: Met with patient and children/family.  Prior to hospitalization patient was independent and living alone in his townhouse.  His family lives close and checks on him often.  Disposition not determined at this time, however, family has offered for patient to stay with them for recovery if needed.  Pt does not have life line currently, but would like information to think about.      PLAN  Financial costs for the patient include na .  Patient given options and choices for discharge Not at this time unclear of disposition needs .  Patient/family is agreeable to the plan?  Family is supportive and involved in care  Patient anticipates discharging to:  Patient would like to go back home is possible.     Patient anticipates needs for home equipment: Does not know  Plan/Disposition: unknown at this time   Appointments: na       Care  (CTS) will continue to follow as needed.    Sloane CHAMPION, RN, PHN  RN Care Coordinator  Care Transitions Department  [facility] [phone]  [facility] [phone][PW1.1]       Revision History        User Key Date/Time User Provider Type Action    > PW1.1 9/30/2018 11:05 AM Narda Lyons, RN Case Manager Sign                     Progress Notes - Physician (Notes for yesterday and today)      Progress Notes by Crissy Laird MD at 9/30/2018  2:38 PM     Author:  Crissy Laird MD Service:  Hospitalist Author Type:  Physician    Filed:  9/30/2018  8:03 PM Date of Service:  9/30/2018  2:38 PM Creation Time:  9/30/2018  2:38 PM    Status:  Signed :  Crissy Laird MD (Physician)         [facility] Progress Note[EP1.1]          Assessment and Plan:   Assessment:[EP1.2]   Principal Problem:    Weakness of both lower extremities[EP1.3]; balance impairment; impulsivity[EP1.2]    Assessment:[EP1.3] Etiology as MRI of the lumbar back is unremarkable for worsening stenosis.  MR I of the head and MRA of the head and neck showed no stroke or vessel obstruction or narrowing.  Vestibular examination by physical therapy has not shown an inner ear issue.  Patient did have an upper respiratory illness that began 3-4 days ago with known underlying dementia and an eye surgery 3 years ago which has made patient have difficulty with depth perception.  Did discuss with patient and his family that at this time no reversible etiology can be identified and it may be a combination of all the above which has allowed this to happen.  At this time patient is unsafe to return home alone and patient and his family are wanting TCU placement for ongoing physical therapy and initiation of occupational therapy services.  They are aware of the out-of-pocket cost and would like to proceed[EP1.2]    Plan:[EP1.3] Social work will be  consulted and will start looking for TCU placement for ongoing PT and OT services.  Patient is medically stable to discharge once appropriate placement is identified.  If patient does have clinical worsening, would consider inpatient admission for further evaluation.[EP1.2]    Active Problems:    Essential hypertension    Assessment:[EP1.3] blood pressures have been slightly elevated in the 140-150 range overnight[EP1.2]    Plan:[EP1.3] Discussed with patient and family that improved blood pressure control in the future may be appropriate but given his balance issues at this time, will continue home regimen of losartan 100 mg daily and monitor.  If blood pressure become elevated above 160s systolic, would consider addition of a second agent.  Ongoing monitoring at TCU[EP1.2]      Viral upper respiratory illness    Assessment:[EP1.3] noted for 2-3 days prior to coming to the ED with patient having one fever of 101.3 early in this observation stay but work up negative and patient having no recurrence, no signs of respiratory worsening.[EP1.2]     Plan:[EP1.3] Discussed that illness may be contributing to patient's decline (may have been the tipping point that allowed for decompensation).  Continue supportive cares.[EP1.2]        Dementia without behavioral disturbance, unspecified dementia type    Assessment:[EP1.3] present at baseline with family very supportive, patient currently lives home alone with family checking on him daily (by phone or in person) with increasing confusion for the 3 days prior to hospitalization but work up negative as above[EP1.2]    Plan:[EP1.3] Will discharge to TCU and monitor cognitive status and improvement.  Did discuss options and goals for long term management as patient's memory continues to decline.  Family is interested in learning more about assistive living facilities and will work with the TCU staff to explore options.[EP1.2]       Spinal stenosis of lumbar region with  neurogenic claudication    Assessment:[EP1.3] chronic and stable on MRI performed during this hospitalization[EP1.2]     Plan:[EP1.3] no acute intervention needed, ongoing neurosurgery as an outpatient[EP1.2]       Hyperlipidemia LDL goal <130    Assessment:[EP1.3] on lipitor at home[EP1.2]    Plan:[EP1.3] restart at time of discharge[EP1.2]      # Pain Assessment:  Current Pain Score 9/29/2018   Patient currently in pain? denies[EP1.1]   Duane s pain level was assessed and he currently denies pain.[EP1.2]        Disposition Plan   Discharge Planning and Disposition    1.  Clinically stable enough to begin D/C disposition and planning:[EP1.1] Yes      2.  Patient Goals for Discharge:      A.  Clinical - The following need to be achieved for discharge:  stable functional status, stable neurologic status and stable vital signs    B.  Diagnostic testing and/or procedures completed and all applicable results available before discharge - cultures, endoscopy, imaging, cardiac testing:  Met    C.  Anticipated post discharge treatment plan formulated and the following needs have been identified: rehab (PT, OT, ST)    3. Potential Barriers to Discharge:  awaiting TCU placement    4. Recommended Disposition:  Transitional Care Unit[EP1.2]          Entered: Crissy Laird 09/30/2018, 2:38 PM       VTE:[EP1.1]  Observation status[EP1.2]   Code Status:[EP1.1]  Full code[EP1.2]        Interval History:[EP1.1]   About the same today.  Vitals signs stable with no recurrance of the one time fever noted over 24 hours ago and no other symptoms concerning for worsening infection - still has mild cough and viral URI symptoms.  Tolerating medications and treatment plan without significant side effects or problems.  Eating and voiding well.  No new concerns today.  Continues to have difficulty with worse than normal confusion and balance issues.[EP1.2]          Significant Problems:[EP1.1]   History reviewed. No pertinent past  "medical history.[EP1.4]         Physical Exam:   Blood pressure (!) 169/96, pulse 80, temperature 96.8  F (36  C), temperature source Oral, resp. rate 18, height 1.727 m (5' 8\"), weight 91.7 kg (202 lb 2.6 oz), SpO2 95 %.[EP1.1]  Constitutional:   awake, alert, cooperative, no apparent distress, and appears stated age     Lungs:   No increased work of breathing, good air exchange, clear to auscultation bilaterally, no crackles or wheezing     Cardiovascular:   Normal apical impulse, regular rate and rhythm     Abdomen:   Bowel sounds present, abdomen soft and non-tender     Musculoskeletal:   Patient has 4-5/5 strength of upper and lower extremity muscles, no change in sensation of the lower legs on exam  no lower extremity pitting edema present     Neurologic:   Awake, alert, oriented to person and knows he is hospitalized but cannot remember the name.  CN 3-12 intact, strength and range of motion examined in the bed and is normal overall without focal deficit.  Patient is slightly tearful at times when discussing his current status and inability to care for himself but is focused on improving as much as he can going forward.[EP1.2]              Data:[EP1.1]   All laboratory and imaging data in the past 24 hours reviewed[EP1.2]    Attestation:  I have reviewed today's vital signs, notes, medications, labs and imaging.     Electronically Signed:  Crissy Laird MD    Note: Chart documentation done in part with Dragon Voice Recognition software. Although reviewed after completion, some word and grammatical errors may remain.[EP1.1]          Revision History        User Key Date/Time User Provider Type Action    > EP1.3 9/30/2018  8:03 PM Crissy Laird MD Physician Sign     EP1.4 9/30/2018  7:47 PM Crissy Laird MD Physician      EP1.2 9/30/2018  7:46 PM Crissy Laird MD Physician      EP1.1 9/30/2018  2:38 PM Crissy Laird MD Physician                 "   Procedure Notes     No notes of this type exist for this encounter.         Progress Notes - Therapies (Notes from 09/28/18 through 10/01/18)      Progress Notes by Aileen Brown PT at 9/29/2018  2:15 PM     Author:  Aileen Brown PT Service:  (none) Author Type:  Physical Therapist    Filed:  9/29/2018  2:15 PM Date of Service:  9/29/2018  2:15 PM Creation Time:  9/29/2018  2:15 PM    Status:  Signed :  Aileen Brown PT (Physical Therapist)            09/29/18 1400   Quick Adds   Type of Visit Initial PT Evaluation   Living Environment   Lives With alone   Living Arrangements house   Home Accessibility bed and bath on same level   Number of Stairs to Enter Home 2   Number of Stairs Within Home 0   Living Environment Comment Pt was an inconsistent historian during evaluation this date. Pt reports he lives alone and did not receive assist with any ADL/IADL tasks, however did state his sons check in on him frequently. Pt stated he drives, and later stated he no longer drives. No family present during evaluation. Unable to determine full living environment history and prior functional level at this time   Functional Level Prior   Ambulation 0-->independent   Transferring 0-->independent   Toileting 0-->independent   Bathing 0-->independent   Dressing 0-->independent   Eating 0-->independent   Communication 0-->understands/communicates without difficulty   Swallowing 0-->swallows foods/liquids without difficulty   Cognition 2 - difficulty with organizing thoughts  (baseline dementia)   Fall history within last six months yes   Number of times patient has fallen within last six months 1   Which of the above functional risks had a recent onset or change? ambulation;transferring;cognition;fall history   General Information   Onset of Illness/Injury or Date of Surgery - Date 09/28/18   Referring Physician    Patient/Family Goals Statement pt does not state   Pertinent History of Current Problem  (include personal factors and/or comorbidities that impact the POC) weakness issues, difficulties with amb   Precautions/Limitations fall precautions   Cognitive Status Examination   Orientation place   Level of Consciousness alert   Follows Commands and Answers Questions 75% of the time   Personal Safety and Judgment impaired   Memory impaired   Cognitive Comment Patient is slow to answer questions and is often confused by the questions asked, very slow to answer   Pain Assessment   Patient Currently in Pain No   Range of Motion (ROM)   ROM Comment WNL   Strength   Strength Comments WNL   Bed Mobility   Bed Mobility Comments supervision   Transfer Skills   Transfer Comments Mod A of 1, 4x LOB   Gait   Gait Comments Amb 100 ft CG A needed assist 4x with LOB.    Balance   Balance Comments poor, pt denies any spinning of the room   Coordination   Coordination Comments poor, when trying to turn to go back where he came from had LOB, stand to sit LOB   General Therapy Interventions   Planned Therapy Interventions balance training;gait training;transfer training   Clinical Impression   Criteria for Skilled Therapeutic Intervention yes, treatment indicated   PT Diagnosis balance is poor, gait difficulties and signs of dementia. possibly central nervous system problem.    Influenced by the following impairments dementia   Functional limitations due to impairments cannot wallk alone, cannot transfer alone   Clinical Presentation Stable/Uncomplicated   Clinical Presentation Rationale not quit sure why pt has the recent [facility]   Clinical Decision Making (Complexity) Moderate complexity   Therapy Frequency` daily   Predicted Duration of Therapy Intervention (days/wks) 4 days   Anticipated Discharge Disposition (pt at this point requires 24 - 7 supervision)   Risk & Benefits of therapy have been explained Yes   Patient, Family & other staff in agreement with plan of care Yes   Clinical Impression Comments Patient has great mm  "tone, good strength, but is requiring supervision and CGA when up because of sudden LOB   [facility] AM-PAC TM \"6 Clicks\"   2016, Trustees of [facility], under license to [employer].  All rights reserved.   6 Clicks Short Forms Basic Mobility Inpatient Short Form   [facility] AM-PAC  \"6 Clicks\" V.2 Basic Mobility Inpatient Short Form   1. Turning from your back to your side while in a flat bed without using bedrails? 3 - A Little   2. Moving from lying on your back to sitting on the side of a flat bed without using bedrails? 3 - A Little   3. Moving to and from a bed to a chair (including a wheelchair)? 3 - A Little   4. Standing up from a chair using your arms (e.g., wheelchair, or bedside chair)? 2 - A Lot   5. To walk in hospital room? 2 - A Lot   6. Climbing 3-5 steps with a railing? 2 - A Lot   Basic Mobility Raw Score (Score out of 24.Lower scores equate to lower levels of function) 15   Total Evaluation Time   Total Evaluation Time (Minutes) 15   Thank you for the referral,            Aileen Brown PT[SR1.1]       Revision History        User Key Date/Time User Provider Type Action    > SR1.1 9/29/2018  2:15 PM Aileen Brown PT Physical Therapist Sign            Progress Notes by Catrina Currie OT at 9/29/2018 12:12 PM     Author:  Catrina Currie OT Service:  (none) Author Type:  Occupational Therapist    Filed:  9/29/2018 12:12 PM Date of Service:  9/29/2018 12:12 PM Creation Time:  9/29/2018 12:12 PM    Status:  Signed :  Catrina Currie OT (Occupational Therapist)          09/29/18 1030   Quick Adds   Type of Visit Initial Occupational Therapy Evaluation   Living Environment   Lives With alone   Living Arrangements house   Home Accessibility bed and bath on same level;tub/shower is not walk in   Number of Stairs to Enter Home 2   Number of Stairs Within Home 0   Living Environment Comment Pt was an inconsistent historian during evaluation this date. Pt reports he lives " alone and did not receive assist with any ADL/IADL tasks, however did state his sons check in on him frequently. Pt stated he drives, and later stated he no longer drives. No family present during evaluation. Unable to determine full living environment history and prior functional level at this time   Self-Care   Dominant Hand right   Usual Activity Tolerance good   Current Activity Tolerance moderate   Regular Exercise no   Functional Level Prior   Ambulation 0-->independent   Transferring 0-->independent   Toileting 0-->independent   Bathing 0-->independent   Dressing 0-->independent   Eating 0-->independent   Communication 0-->understands/communicates without difficulty   Swallowing 0-->swallows foods/liquids without difficulty   Cognition 2 - difficulty with organizing thoughts  (baseline dementia)   Fall history within last six months yes   Number of times patient has fallen within last six months 1   Which of the above functional risks had a recent onset or change? ambulation;transferring;cognition;fall history   General Information   Onset of Illness/Injury or Date of Surgery - Date 09/28/18   Referring Physician Nickolas Boggs MD   Patient/Family Goals Statement Pt stated he plans to return home   Additional Occupational Profile Info/Pertinent History of Current Problem Pt is a 73 year old male being seen for OT evaluation for self care deficits and confusion. Pt presented to the emergency room with his 2 sons and daughter-in-law due to concerns of increased weakness and some confusion over the last few days. Pt also reports some confusion, dizziness, and balance deficits over the past few months. Pt has a history of dementia. Pt reports he lives alone and did not receive assistance with any ADL/IADL tasks.   Precautions/Limitations fall precautions   Cognitive Status Examination   Orientation person;place  (oriented to place with some verbal prompts)   Level of Consciousness confused   Able to Follow  Commands mild impairment   Personal Safety (Cognitive) at risk behaviors demonstrated;decreased awareness, need for assist;decreased awareness, need for safety;decreased insight to deficits;moderate impairment   Memory impaired   Attention Distractible during evaluation   Cognitive Comment Pt scored a 15/30 on the [facility] cognitive screen, indicating moderate-severe cognitive impairment. Pt has a dx of dementia with unknown cognitive baseline. Pt also demonstrated impairments with memory, command following, insight, and personal safety. Pt was an inconsistent historian this date and provided multiple/changing answers to questions. Based on pt's current cognitive level of function, pt would benefit from 24/7 supervision/assist with ADL/IADL tasks   Visual Perception   Occulomotor Impaired visual pursuits, saccades, and convergence. Sporatic tracking of moving objects. Convergence not present. Decreased efficiency with saccades.    Bathing   Level of [place] minimum assist (75% patients effort)   Physical Assist/Nonphysical Assist 1 person assist   Upper Body Dressing   Level of [place]: Dress Upper Body contact guard   Physical Assist/Nonphysical Assist: Dress Upper Body 1 person assist   Lower Body Dressing   Level of [place]: Dress Lower Body contact guard   Physical Assist/Nonphysical Assist: Dress Lower Body 1 person assist   Toileting   Level of [place]: Toilet contact guard   Physical Assist/Nonphysical Assist: Toilet 1 person assist   Grooming   Level of [place]: Grooming contact guard   Physical Assist/Nonphysical Assist: Grooming 1 person assist   Eating/Self Feeding   Level of [place]: Eating independent   Instrumental Activities of Daily Living (IADL)   Previous Responsibilities meal prep;housekeeping;laundry;shopping;yardwork;finances;medication management;driving   IADL Comments Unknown accuracy of previous responsibilities as pt is a poor historian   Activities of Daily  "Living Analysis   Impairments Contributing to Impaired Activities of Daily Living cognition impaired;balance impaired;strength decreased   Clinical Impression   Criteria for Skilled Therapeutic Interventions Met yes, treatment indicated   OT Diagnosis decreased independence with ADL/IADL tasks   Influenced by the following impairments impaired balance, cognition, visual deficits   Assessment of Occupational Performance 3-5 Performance Deficits   Identified Performance Deficits bathing, dressing, toileting, home mgmt   Clinical Decision Making (Complexity) Moderate complexity   Therapy Frequency daily   Predicted Duration of Therapy Intervention (days/wks) 1-3 days   Anticipated Equipment Needs at Discharge shower chair   Anticipated Discharge Disposition Home with Assist;Transitional Care Facility;Home with Home Therapy   Risks and Benefits of Treatment have been explained. Yes   Patient, Family & other staff in agreement with plan of care Yes   Clinical Impression Comments Pt would benefit from skilled OT services to increase independence with daily tasks and to ensure safe d/c plan from inpatient stay   [facility] TM \"6 Clicks\"   2016, Trustees of [facility], under license to [employer].  All rights reserved.   6 Clicks Short Forms Daily Activity Inpatient Short Form   [facility]  \"6 Clicks\" Daily Activity Inpatient Short Form   1. Putting on and taking off regular lower body clothing? 3 - A Little   2. Bathing (including washing, rinsing, drying)? 3 - A Little   3. Toileting, which includes using toilet, bedpan or urinal? 3 - A Little   4. Putting on and taking off regular upper body clothing? 3 - A Little   5. Taking care of personal grooming such as brushing teeth? 3 - A Little   6. Eating meals? 4 - None   Daily Activity Raw Score (Score out of 24.Lower scores equate to lower levels of function) 19   Total Evaluation Time   Total Evaluation Time (Minutes) 25     Catrina " HANK RyanR/L  [facility]  [phone][NS1.1]       Revision History        User Key Date/Time User Provider Type Action    > NS1.1 9/29/2018 12:12 PM Catrina Currie OT Occupational Therapist Sign                                                      INTERAGENCY TRANSFER FORM - LAB / IMAGING / EKG / EMG RESULTS   9/28/2018                       [address] SURGICAL: [phone]            Unresulted Labs     None         Lab Results - 3 Days      Blood culture [[id number]]  Resulted: 10/01/18 0818, Result status: Preliminary result    Ordering provider: Crissy Laird MD  09/29/18 1010 Resulting lab: [facility]    Specimen Information    Type Source Collected On   Blood  09/29/18 1031          Components       Value Reference Range Flag Lab   Specimen Description Blood      Special Requests Right Arm   FN Lab   Culture Micro No growth after 2 days   [facility]            Blood culture [[id number]]  Resulted: 10/01/18 0818, Result status: Preliminary result    Ordering provider: Crissy Laird MD  09/29/18 1010 Resulting lab: [facility]    Specimen Information    Type Source Collected On   Blood  09/29/18 1035          Components       Value Reference Range Flag Lab   Specimen Description Blood      Special Requests Left Arm   FN Lab   Culture Micro No growth after 2 days   [facility]            Basic metabolic panel [[id number]] (Abnormal)  Resulted: 09/29/18 1102, Result status: Final result    Ordering provider: Crissy Laird MD  09/29/18 1010 Resulting lab: [facility]    Specimen Information    Type Source Collected On   Blood  09/29/18 1036          Components       Value Reference Range Flag Lab   Sodium 141 133 - 144 mmol/L  [facility]   Potassium 4.1 3.4 - 5.3 mmol/L  [facility]   Chloride 108 94 - 109 mmol/L  [facility]   Carbon Dioxide 24 20 - 32 mmol/L  [facility]   Anion Gap 9 3 - 14  mmol/L  [facility]   Glucose 200 70 - 99 mg/dL H [facility]   Urea Nitrogen 16 7 - 30 mg/dL  [facility]   Creatinine 0.79 0.66 - 1.25 mg/dL  [facility]   GFR Estimate >90 >60 mL/min/1.7m2  [facility]   Comment:  Non  GFR Calc   GFR Estimate If Black >90 >60 mL/min/1.7m2  [facility]   Comment:  African American GFR Calc   Calcium 8.4 8.5 - 10.1 mg/dL L [facility]            CBC with platelets [[id number]] (Abnormal)  Resulted: 09/29/18 1044, Result status: Final result    Ordering provider: Crissy Laird MD  09/29/18 1010 Resulting lab: [facility]    Specimen Information    Type Source Collected On   Blood  09/29/18 1036          Components       Value Reference Range Flag Lab   WBC 4.8 4.0 - 11.0 10e9/L  [facility]   RBC Count 4.12 4.4 - 5.9 10e12/L L [facility]   Hemoglobin 12.4 13.3 - 17.7 g/dL L [facility]   Hematocrit 36.8 40.0 - 53.0 % L [facility]   MCV 89 78 - 100 fl  [facility]   MCH 30.1 26.5 - 33.0 pg  [facility]   MCHC 33.7 31.5 - 36.5 g/dL  [facility]   RDW 12.3 10.0 - 15.0 %  [facility]   Platelet Count 140 150 - 450 10e9/L L [facility]            UA reflex to Microscopic and Culture [[id number]] (Abnormal)  Resulted: 09/29/18 0026, Result status: Edited Result - FINAL    Ordering provider: Ryan Benitez DO  09/28/18 2331 Resulting lab: [facility]    Specimen Information    Type Source Collected On   Unspecified Urine Urine Midstream 09/29/18 0002          Components       Value Reference Range Flag Lab   Color Urine Yellow   [facility]   Appearance Urine Slightly Cloudy   [facility]   Glucose Urine Negative NEG^Negative mg/dL  [facility]   Bilirubin Urine Negative NEG^Negative  [facility]   Ketones Urine Negative NEG^Negative mg/dL  [facility]   Specific [place] Urine 1.025 1.003 - 1.035  [facility]   Blood Urine Negative NEG^Negative  [facility]   pH Urine 5.0 5.0 - 7.0 pH  [facility]   Protein Albumin Urine Negative NEG^Negative mg/dL  [facility]   Urobilinogen mg/dL 2.0 0.0 - 2.0 mg/dL   [facility]   Nitrite Urine Negative NEG^Negative  [facility]   Leukocyte Esterase Urine Negative NEG^Negative  [facility]   Source Unspecified Urine   [facility]   RBC Urine 1 0 - 2 /HPF  [facility]   WBC Urine 1 0 - 5 /HPF  [facility]   Mucous Urine Present NEG^Negative /LPF A [facility]            Comprehensive metabolic panel [[id number]] (Abnormal)  Resulted: 09/28/18 2347, Result status: Final result    Ordering provider: Ryan Benitez,   09/28/18 2331 Resulting lab: [facility]    Specimen Information    Type Source Collected On   Blood  09/28/18 2321          Components       Value Reference Range Flag Lab   Sodium 134 133 - 144 mmol/L  [facility]   Potassium 3.9 3.4 - 5.3 mmol/L  [facility]   Chloride 101 94 - 109 mmol/L  [facility]   Carbon Dioxide 25 20 - 32 mmol/L  [facility]   Anion Gap 8 3 - 14 mmol/L  [facility]   Glucose 113 70 - 99 mg/dL H [facility]   Urea Nitrogen 20 7 - 30 mg/dL  [facility]   Creatinine 0.91 0.66 - 1.25 mg/dL  [facility]   GFR Estimate 81 >60 mL/min/1.7m2  [facility]   Comment:  Non  GFR Calc   GFR Estimate If Black >90 >60 mL/min/1.7m2  [facility]   Comment:  African American GFR Calc   Calcium 8.2 8.5 - 10.1 mg/dL L [facility]   Bilirubin Total 0.9 0.2 - 1.3 mg/dL  [facility]   Albumin 3.2 3.4 - 5.0 g/dL L [facility]   Protein Total 7.2 6.8 - 8.8 g/dL  [facility]   Alkaline Phosphatase 107 40 - 150 U/L  [facility]   ALT 52 0 - 70 U/L  [facility]   AST 40 0 - 45 U/L  [facility]            Lipase [[id number]]  Resulted: 09/28/18 2347, Result status: Final result    Ordering provider: Ryan Benitez,   09/28/18 2331 Resulting lab: [facility]    Specimen Information    Type Source Collected On   Blood  09/28/18 2321          Components       Value Reference Range Flag Lab   Lipase 94 73 - 393 U/L  [facility]            CRP inflammation [[id number]] (Abnormal)  Resulted: 09/28/18 2347, Result status: Final result    Ordering provider: Ryan Benitez, DO  09/28/18 3310  Resulting lab: [facility]    Specimen Information    Type Source Collected On   Blood  09/28/18 2321          Components       Value Reference Range Flag Lab   CRP Inflammation 63.1 0.0 - 8.0 mg/L H [facility]            CBC with platelets differential [[id number]] (Abnormal)  Resulted: 09/28/18 2336, Result status: Final result    Ordering provider: Ryan Benitez,   09/28/18 2331 Resulting lab: [facility]    Specimen Information    Type Source Collected On   Blood  09/28/18 2321          Components       Value Reference Range Flag Lab   WBC 7.9 4.0 - 11.0 10e9/L  FN Lab   RBC Count 4.25 4.4 - 5.9 10e12/L L [facility]   Hemoglobin 12.9 13.3 - 17.7 g/dL L FN Lab   Hematocrit 37.9 40.0 - 53.0 % L [facility]   MCV 89 78 - 100 fl  [facility]   MCH 30.4 26.5 - 33.0 pg  [facility]   MCHC 34.0 31.5 - 36.5 g/dL  [facility]   RDW 12.3 10.0 - 15.0 %  [facility]   Platelet Count 146 150 - 450 10e9/L L [facility]   Diff Method Automated Method   [facility]   % Neutrophils 65.8 %  [facility]   % Lymphocytes 18.5 %  [facility]   % Monocytes 12.5 %  [facility]   % Eosinophils 2.4 %  [facility]   % Basophils 0.5 %  [facility]   % Immature Granulocytes 0.3 %  [facility]   Nucleated RBCs 0 0 /100  [facility]   Absolute Neutrophil 5.2 1.6 - 8.3 10e9/L  FN Lab   Absolute Lymphocytes 1.5 0.8 - 5.3 10e9/L  [facility]   Absolute Monocytes 1.0 0.0 - 1.3 10e9/L  [facility]   Absolute Basophils 0.0 0.0 - 0.2 10e9/L  [facility]   Abs Immature Granulocytes 0.0 0 - 0.4 10e9/L  [facility]   Absolute Nucleated RBC 0.0   [facility]            Lactic acid whole blood [[id number]]  Resulted: 09/28/18 2334, Result status: Final result    Ordering provider: Ryan Benitez,   09/28/18 2331 Resulting lab: [facility]    Specimen Information    Type Source Collected On   Blood  09/28/18 0551          Components       Value Reference Range Flag Lab   Lactic Acid 0.9 0.7 - 2.0 mmol/L  [facility]            Testing Performed By      Lab - Abbreviation Name Director Address Valid Date Range    22 - [facility] Unknown [address] [place] [address] 05/08/15 1057 - Present               Imaging Results - 3 Days      MRA Neck (Carotids) wo & w Contrast [[id number]]  Resulted: 09/30/18 1234, Result status: Final result    Ordering provider: Crissy Laird MD  09/30/18 0838 Resulted by: Kacie Weiss MD    Performed: 09/30/18 0844 - 09/30/18 1013 Resulting lab: RADIOLOGY RESULTS    Narrative:       MRA NECK WITHOUT AND WITH CONTRAST  9/30/2018 10:13 AM     COMPARISON: None.    HISTORY:   Increasing confusion, balance impairment, left leg weakness  starting 3 days ago.     TECHNIQUE: 2D time-of-flight MR angiogram of the neck without contrast  and 3D MR angiogram of the neck with 10 mL Gadavist gadolinium IV  contrast.  MIP reconstruction of all MR angiographic data was  performed. Estimates of carotid stenoses are made relative to the  distal internal carotid artery diameters except as noted.    FINDINGS:    Carotids: The common carotid arteries bilaterally are widely patent.  There is mild atherosclerotic narrowing (less than 50% luminal  diameter stenosis) at the origin of the right internal carotid artery.  The internal carotid arteries bilaterally are otherwise patent and  unremarkable.    Vertebrals: The origins of the vertebral arteries bilaterally are  obscured by motion artifact. The visualized portions of the vertebral  arteries bilaterally are patent without stenosis.    Aortic arch: The arteries as they arise from the aortic arch are  normally arranged with no evidence for stenosis.      Impression:       IMPRESSION:    Mild atherosclerotic narrowing of the origin of the  right internal carotid artery. Nonvisualization of the origins of the  vertebral arteries bilaterally. Otherwise, normal neck MRA.    KACIE WEISS MD      MRA Brain ([place]) wo Contrast [[id number]]   Resulted: 09/30/18 1234, Result status: Final result    Ordering provider: Crissy Laird MD  09/30/18 0812 Resulted by: Kacie Weiss MD    Performed: 09/30/18 0843 - 09/30/18 1012 Resulting lab: RADIOLOGY RESULTS    Narrative:       MR ANGIOGRAM OF THE HEAD WITHOUT CONTRAST   9/30/2018 10:12 AM     COMPARISON: None.    HISTORY: Increasing confusion, balance impairment, left leg weakness  starting 3 days ago.     TECHNIQUE:  3D time-of-flight MR angiogram of the head without  contrast. MIP reconstruction of all MR angiographic data was  performed.    FINDINGS:  The P1 segment of the left posterior cerebral artery is not  visualized and is likely hypoplastic or congenitally absent. The left  posterior cerebral artery is supplied by the patent left posterior  communicating artery. The bilateral distal internal carotid, basilar,  bilateral anterior cerebral, bilateral middle cerebral and bilateral  posterior cerebral arteries are patent and unremarkable with no  evidence for cerebral artery stenosis or aneurysm. The anterior  communicating artery is patent and unremarkable. The posterior  communicating artery on the left is patent and unremarkable.       Impression:       IMPRESSION:  Normal MR angiogram of the head.      KACIE WEISS MD      MR Brain w/o Contrast [[id number]]  Resulted: 09/30/18 1234, Result status: Final result    Ordering provider: Crissy Laird MD  09/30/18 0006 Resulted by: Kacie Weiss MD    Performed: 09/30/18 0843 - 09/30/18 1012 Resulting lab: RADIOLOGY RESULTS    Narrative:       MRI OF THE BRAIN WITHOUT AND WITH CONTRAST 9/30/2018 10:12 AM     COMPARISON: Head CT 9/29/2018.    HISTORY:  Increasing confusion, balance impairment, left leg weakness  starting 3 days ago.      TECHNIQUE: Axial diffusion-weighted with ADC map, axial T2-weighted  with fat saturation, axial T1-weighted, axial turboFLAIR and coronal  T1-weighted images of the brain were  acquired without intravenous  contrast.  Following intravenous administration of gadolinium (10 mL  Gadavist), axial T1-weighted images of the brain were acquired.     FINDINGS: There is moderate diffuse cerebral volume loss. There are  multiple tiny scattered focal areas of abnormal T2 signal  hyperintensity in the cerebral white matter bilaterally that are  consistent with sequela of chronic small vessel ischemic disease.    The ventricles and basal cisterns are within normal limits in  configuration given the degree of cerebral volume loss.  There is no  midline shift.  There are no extra-axial fluid collections.  There is  no evidence for stroke or acute intracranial hemorrhage.  There is no  abnormal contrast enhancement in the brain or its coverings.    There is no sinusitis or mastoiditis.      Impression:       IMPRESSION:  Diffuse cerebral volume loss and cerebral white matter  changes consistent with chronic small vessel ischemic disease. No  evidence for acute intracranial pathology.    KACIE WEISS MD      CT Head w/o contrast* [[id number]]  Resulted: 09/29/18 1601, Result status: Final result    Ordering provider: Crissy Laird MD  09/29/18 1517 Resulted by: Kacie Weiss MD    Performed: 09/29/18 1554 - 09/29/18 1555 Resulting lab: RADIOLOGY RESULTS    Narrative:       CT OF THE HEAD WITHOUT CONTRAST 9/29/2018 3:55 PM     COMPARISON: None.    HISTORY: Increased confusion and balance issues, left leg weakness all  starting approximately 3 days ago.    TECHNIQUE: 5 mm thick axial CT images of the head were acquired  without IV contrast material.    FINDINGS: There is moderate diffuse cerebral volume loss. There are  subtle patchy areas of decreased density in the cerebral white matter  bilaterally that are consistent with sequela of chronic small vessel  ischemic disease.    The ventricles and basal cisterns are within normal limits in  configuration given the degree of cerebral  volume loss.  There is no  midline shift. There are no extra-axial fluid collections.    No intracranial hemorrhage, mass or recent infarct.    The visualized paranasal sinuses are well-aerated. There is no  mastoiditis. There are no fractures of the visualized bones.      Impression:       IMPRESSION: Diffuse cerebral volume loss and cerebral white matter  changes consistent with chronic small vessel ischemic disease. No  evidence for acute intracranial pathology.    Radiation dose for this scan was reduced using automated exposure  control, adjustment of the mA and/or kV according to patient size, or  iterative reconstruction technique.     KACIE WEISS MD      MR Lumbar Spine w/o Contrast [[id number]]  Resulted: 09/29/18 1526, Result status: Final result    Ordering provider: Nickolas Boggs MD  09/29/18 0313 Resulted by: Kacie Weiss MD    Performed: 09/29/18 0812 - 09/29/18 0850 Resulting lab: RADIOLOGY RESULTS    Narrative:       MRI OF THE LUMBAR SPINE WITHOUT CONTRAST  9/29/2018 8:50 AM     COMPARISON: Lumbar spine MRI 7/12/2018    HISTORY:  Increasing weakness in legs.     TECHNIQUE: Multiplanar, multisequence MRI images of the lumbar spine  were acquired without IV contrast.    FINDINGS: There are five lumbar-type vertebrae for the purposes of  this dictation.     There is continued normal alignment of the lumbar vertebrae. Vertebral  body heights of the lumbar spine remain normal.  Marrow signal  throughout the lumbar vertebrae remains normal. There is no evidence  for fracture or pathologic bony lesion of the lumbar spine.    There is loss of disc height, disc desiccation and posterior disc  bulging at the L3-L4, L4-L5 and L5-S1 levels. The lumbar  intervertebral discs are otherwise within normal limits in height,  contour and signal intensity. There is a rudimentary disc at the S1-S2  level that remains normal in appearance.    The tip of the conus medullaris is at the lower L1 level which  is  within normal limits. There is no evidence for intrathecal  abnormality.     Level by level:     L1-L2: There is no spinal canal or neural foraminal stenosis at this  level. No change from the comparison study.    L2-L3: There is mild facet arthropathy bilaterally. There is no spinal  canal or neural foraminal stenosis at this level. No change from the  comparison study.    L3-L4: There is a minimal circumferential disc bulge and mild facet  arthropathy bilaterally. These findings result in minimal right  foraminal narrowing but no spinal canal or left foraminal stenosis. No  change from the comparison study.    L4-L5: There is a circumferential disc bulge, thickening of the  ligamentum flavum bilaterally and severe facet arthropathy  bilaterally. These findings result in moderate-severe spinal canal  stenosis, moderate left foraminal stenosis and mild right foraminal  narrowing. No change from the comparison study.    L5-S1: There is a circumferential disc bulge and severe facet  arthropathy bilaterally. These findings result in severe left  foraminal stenosis, moderate right foraminal stenosis and mild spinal  canal narrowing. No change from the comparison study.      Impression:       IMPRESSION: Degenerative changes of the lumbar spine as described  above without significant change from the comparison study.    KACIE WEISS MD      Lumbar spine CT w/o contrast [[id number]]  Resulted: 09/29/18 1525, Result status: Final result    Ordering provider: Ryan Benitez DO  09/28/18 2331 Resulted by: Kacie Weiss MD    Performed: 09/28/18 0015 - 09/29/18 0030 Resulting lab: RADIOLOGY RESULTS    Narrative:       CT OF THE LUMBAR SPINE WITHOUT CONTRAST  9/29/2018 12:30 AM     COMPARISON: Lumbar spine MRI 7/12/2018    HISTORY:  Acute bilateral leg weakness, question of spinal stenosis.      TECHNIQUE: Axial images of the lumbar spine were acquired without  intravenous contrast. Multiplanar  reformations were created from the  axial source images.      FINDINGS:  Grade 1 degenerative anterolisthesis of L4 upon L5 again  noted without change. Alignment of the lumbar vertebrae is otherwise  normal. Vertebral body heights of the lumbar spine remain normal.  There is no evidence for fracture of the lumbar spine. There is  moderate-severe degenerative facet arthropathy bilaterally at the  L3-L4, L4-L5 and L5-S1 levels. Posterior disc bulges at the L3-L4,  L4-L5 and L5-S1 levels are again noted without change. Moderate-severe  spinal canal narrowing at the L4-L5 level persists without change.  There is no other significant degenerative spinal canal narrowing of  the lumbar spine.      Impression:       IMPRESSION: Degenerative changes of the lumbar spine as described  above without significant change from the comparison MRI.    Radiation dose for this scan was reduced using automated exposure  control, adjustment of the mA and/or kV according to patient size, or  iterative reconstruction technique.    KACIE ERWIN MD      XR Chest 2 Views [[id number]]  Resulted: 09/29/18 0250, Result status: Final result    Ordering provider: Ryan Benitez DO  09/28/18 2331 Resulted by: Nolan Garcia MD    Performed: 09/28/18 0030 - 09/29/18 0039 Resulting lab: RADIOLOGY RESULTS    Narrative:       CHEST 2 VIEWS  9/29/2018 12:39 AM     HISTORY: Cough. Weakness.    COMPARISON: None.    FINDINGS: A linear opacity in the right lung base most likely  represents atelectasis or scarring. The lungs are otherwise clear.  Normal-sized cardiac silhouette. Atherosclerotic calcification in the  thoracic aorta.      Impression:       IMPRESSION: No convincing evidence of active cardiopulmonary disease.    NOLAN GARCIA MD      Testing Performed By     Lab - Abbreviation Name Director Address Valid Date Range    104 - Rad Rslts RADIOLOGY RESULTS Unknown Unknown 02/16/05 1553 - Present            Encounter-Level  Documents:     There are no encounter-level documents.      Order-Level Documents:     There are no order-level documents.

## 2018-09-28 NOTE — IP AVS SNAPSHOT
MRN:[id number]                      After Visit Summary   9/28/2018    Duane Tegg    MRN: [id number]           Thank you!     Thank you for choosing [facility] for your care. Our goal is always to provide you with excellent care. Hearing back from our patients is one way we can continue to improve our services. Please take a few minutes to complete the written survey that you may receive in the mail after you visit with us. Thank you!        Patient Information     Date Of Birth          1945        About your hospital stay     You were admitted on:  September 29, 2018 You last received care in the:  [address] [facility]    You were discharged on:  October 1, 2018       Who to Call     For medical emergencies, please call 911.  For non-urgent questions about your medical care, please call your primary care provider or clinic, [phone]          Attending Provider     Provider Specialty    Ryan Benitez DO [facility]    Nickolas Boggs MD [facility]       Primary Care Provider Office Phone # Fax #    Heike Antonio [phone]      After Care Instructions     Activity - Up with assistive device           Advance Diet as Tolerated       Follow this diet upon discharge: Regular            General info for SNF       Length of Stay Estimate: Short Term Care: Estimated # of Days <30  Condition at Discharge: Improving  Level of care:skilled   Rehabilitation Potential: Good  Admission H&P remains valid and up-to-date: Yes  Recent Chemotherapy: N/A  Use Nursing Home Standing Orders: Yes            Mantoux instructions       Give two-step Mantoux (PPD) Per Facility Policy Yes                  Follow-up Appointments     Follow Up and recommended labs and tests       Follow up with Nursing home physician.  No follow up labs or test are needed.                  Additional Services     Occupational Therapy Adult Consult       Evaluate and  "treat as clinically indicated.    Reason:  Dementia with poor memory and impulsiveness            Physical Therapy Adult Consult       Evaluate and treat as clinically indicated.    Reason:  Leg weakness                  Further instructions from your care team       Neurology Appointment:  Monday Oct. 8th at 1:00pm (check in 15 min early and have photo ID and ins card with you)  Dr. Raj BARBER of Neurology/[place]  [address] [address]  [phone]    Pending Results     Date and Time Order Name Status Description    9/29/2018 1010 Blood culture Preliminary     9/29/2018 1010 Blood culture Preliminary             Statement of Approval     Ordered          10/01/18 1153  I have reviewed and agree with all the recommendations and orders detailed in this document.  EFFECTIVE NOW     Approved and electronically signed by:  Nickolas Boggs MD             Admission Information     Date & Time Provider Department Dept. Phone    9/28/2018 Nickolas Boggs MD [address] [facility] [phone]      Your Vitals Were     Blood Pressure Pulse Temperature Respirations Height Weight    142/69 (BP Location: Left arm) 55 96.2  F (35.7  C) (Oral) 20 1.727 m (5' 8\") 91.7 kg (202 lb 2.6 oz)    Pulse Oximetry BMI (Body Mass Index)                94% 30.74 kg/m2          Intercom Information     [employer] gives you secure access to your electronic health record. If you see a primary care provider, you can also send messages to your care team and make appointments. If you have questions, please call your primary care clinic.  If you do not have a primary care provider, please call [phone] and they will assist you.        Care EveryWhere ID     This is your Care EveryWhere ID. This could be used by other organizations to access your [facility] medical records  [id number]        Equal Access to Services     MARCELLA SY AH: sana Villagomez, siobhan moncada " jr mccarthylorie trentrobinson laRennyaan ah. So [facility] [phone].    ATENCIÓN: Si habla della, tiene a jorge disposición servicios gratuitos de asistencia lingüística. Tiago al [phone].    We comply with applicable federal civil rights laws and [place] laws. We do not discriminate on the basis of race, color, national origin, age, disability, sex, sexual orientation, or gender identity.               Review of your medicines      START taking        Dose / Directions    predniSONE 20 MG tablet   Commonly known as:  DELTASONE   Used for:  Spinal stenosis at L4-L5 level        Dose:  20 mg   Take 1 tablet (20 mg) by mouth daily for 5 days   Quantity:  5 tablet   Refills:  0         CONTINUE these medicines which have NOT CHANGED        Dose / Directions    aspirin 81 MG tablet   Used for:  Benign essential hypertension        Dose:  81 mg   Take 1 tablet (81 mg) by mouth daily   Quantity:  90 tablet   Refills:  3       atorvastatin 20 MG tablet   Commonly known as:  LIPITOR   Used for:  Hyperlipidemia LDL goal <100        TAKE 1 TABLET(20 MG) BY MOUTH DAILY   Quantity:  90 tablet   Refills:  0       donepezil 5 MG tablet   Commonly known as:  ARIcept   Used for:  Dementia without behavioral disturbance, unspecified dementia type        TAKE 1 TABLET(5 MG) BY MOUTH AT BEDTIME   Quantity:  90 tablet   Refills:  1       losartan 100 MG tablet   Commonly known as:  COZAAR   Used for:  Benign essential hypertension        TAKE 1 TABLET(100 MG) BY MOUTH DAILY   Quantity:  90 tablet   Refills:  0       order for DME   Used for:  Essential hypertension        Equipment being ordered: blood pressure machine and cuff   Quantity:  1 each   Refills:  0         STOP taking     diazepam 10 MG tablet   Commonly known as:  VALIUM                Where to get your medicines      Some of these will need a paper prescription and others can be bought over the counter. Ask your nurse if you have questions.     You don't  need a prescription for these medications     aspirin 81 MG tablet    atorvastatin 20 MG tablet    donepezil 5 MG tablet    losartan 100 MG tablet    predniSONE 20 MG tablet                Protect others around you: Learn how to safely use, store and throw away your medicines at www.disposemymeds.org.             Medication List: This is a list of all your medications and when to take them. Check marks below indicate your daily home schedule. Keep this list as a reference.      Medications           Morning Afternoon Evening Bedtime As Needed    aspirin 81 MG tablet   Take 1 tablet (81 mg) by mouth daily                                   atorvastatin 20 MG tablet   Commonly known as:  LIPITOR   TAKE 1 TABLET(20 MG) BY MOUTH DAILY                                   donepezil 5 MG tablet   Commonly known as:  ARIcept   TAKE 1 TABLET(5 MG) BY MOUTH AT BEDTIME   Last time this was given:  5 mg on 9/30/2018  8:35 PM                                   losartan 100 MG tablet   Commonly known as:  COZAAR   TAKE 1 TABLET(100 MG) BY MOUTH DAILY   Last time this was given:  100 mg on 10/1/2018  9:12 AM                                   order for DME   Equipment being ordered: blood pressure machine and cuff                                predniSONE 20 MG tablet   Commonly known as:  DELTASONE   Take 1 tablet (20 mg) by mouth daily for 5 days

## 2018-09-28 NOTE — IP AVS SNAPSHOT
"[address] MEDICAL SURGICAL: [phone]                                              INTERAGENCY TRANSFER FORM - PHYSICIAN ORDERS   2018                    Hospital Admission Date: 2018  DUANE TEGG   : 1945  Sex: Male        Attending Provider: Nickolas Boggs MD     Allergies:  No Known Allergies    Infection:  None   Service:  HOSPITALIST    Ht:  1.727 m (5' 8\")   Wt:  91.7 kg (202 lb 2.6 oz)   Admission Wt:  91.7 kg (202 lb 2.6 oz)    BMI:  30.74 kg/m 2   BSA:  2.1 m 2            Patient PCP Information     Provider PCP Type    Heike Antonio MD General      ED Clinical Impression     Diagnosis Description Comment Added By Time Added    Spinal stenosis at L4-L5 level [M48.061] Spinal stenosis at L4-L5 level [M48.061]  Ryan Benitez DO 2018  2:02 AM    Weakness of both lower extremities [R29.898] Weakness of both lower extremities [R29.898]  Ryan Benitez DO 2018  2:57 AM    Viral upper respiratory illness [J06.9, B97.89] Viral upper respiratory illness [J06.9, B97.89]  Ryan Benitez DO 2018  2:57 AM    Spinal stenosis of lumbar region with neurogenic claudication [M48.062] Spinal stenosis of lumbar region with neurogenic claudication [M48.062]  Ryan Benitez DO 2018  2:57 AM    Mild cognitive impairment [G31.84] Mild cognitive impairment [G31.84]  Ryan Benitez DO 2018  2:57 AM    Essential hypertension [I10] Essential hypertension [I10]  Ryan Benitez DO 2018  2:57 AM      Hospital Problems as of 10/1/2018              Priority Class Noted POA    Essential hypertension Medium  2015 Yes    Dementia without behavioral disturbance, unspecified dementia type Medium  2017 Yes    * (Principal)Weakness of both lower extremities Medium  2018 Yes    Spinal stenosis of lumbar region with neurogenic claudication Medium  2018 Yes    Viral upper respiratory illness " Medium  9/29/2018 Yes    Hyperlipidemia LDL goal <130 Medium  9/29/2018 Yes      Non-Hospital Problems as of 10/1/2018              Priority Class Noted    Right bundle branch block Medium  11/26/2015    Mild cognitive impairment Medium  12/26/2015    Lumbago Medium  8/10/2018    Leg weakness, bilateral Medium  9/29/2018      Code Status History     Date Active Date Inactive Code Status Order ID Comments User Context    10/1/2018 11:52 AM  Full Code [id number]  Nickolas Boggs MD Outpatient    9/29/2018  3:13 AM 10/1/2018 11:52 AM Full Code [id number]  Nickolas Boggs MD Inpatient         Medication Review      START taking        Dose / Directions Comments    predniSONE 20 MG tablet   Commonly known as:  DELTASONE   Used for:  Spinal stenosis at L4-L5 level        Dose:  20 mg   Take 1 tablet (20 mg) by mouth daily for 5 days   Quantity:  5 tablet   Refills:  0          CONTINUE these medications which have NOT CHANGED        Dose / Directions Comments    aspirin 81 MG tablet   Used for:  Benign essential hypertension        Dose:  81 mg   Take 1 tablet (81 mg) by mouth daily   Quantity:  90 tablet   Refills:  3        atorvastatin 20 MG tablet   Commonly known as:  LIPITOR   Used for:  Hyperlipidemia LDL goal <100        TAKE 1 TABLET(20 MG) BY MOUTH DAILY   Quantity:  90 tablet   Refills:  0        donepezil 5 MG tablet   Commonly known as:  ARIcept   Used for:  Dementia without behavioral disturbance, unspecified dementia type        TAKE 1 TABLET(5 MG) BY MOUTH AT BEDTIME   Quantity:  90 tablet   Refills:  1        losartan 100 MG tablet   Commonly known as:  COZAAR   Used for:  Benign essential hypertension        TAKE 1 TABLET(100 MG) BY MOUTH DAILY   Quantity:  90 tablet   Refills:  0        order for DME   Used for:  Essential hypertension        Equipment being ordered: blood pressure machine and cuff   Quantity:  1 each   Refills:  0          STOP taking     diazepam 10 MG tablet   Commonly  known as:  VALIUM                     Further instructions from your care team       Neurology Appointment:  Monday Oct. 8th at 1:00pm (check in 15 min early and have photo ID and ins card with you)  Dr. Raj BARBER of Neurology/[place]  [address] [place] [address]  [phone]    After Care     Activity - Up with assistive device           General info for SNF       Length of Stay Estimate: Short Term Care: Estimated # of Days <30  Condition at Discharge: Improving  Level of care:skilled   Rehabilitation Potential: Good  Admission H&P remains valid and up-to-date: Yes  Recent Chemotherapy: N/A  Use Nursing Home Standing Orders: Yes       Mantoux instructions       Give two-step Mantoux (PPD) Per Facility Policy Yes             Referrals     Occupational Therapy Adult Consult       Evaluate and treat as clinically indicated.    Reason:  Dementia with poor memory and impulsiveness       Physical Therapy Adult Consult       Evaluate and treat as clinically indicated.    Reason:  Leg weakness             Follow-Up Appointment Instructions     Future Labs/Procedures    Follow Up and recommended labs and tests     Comments:    Follow up with Nursing home physician.  No follow up labs or test are needed.      Follow-Up Appointment Instructions     Follow Up and recommended labs and tests       Follow up with Nursing home physician.  No follow up labs or test are needed.             Statement of Approval     Ordered          10/01/18 1153  I have reviewed and agree with all the recommendations and orders detailed in this document.  EFFECTIVE NOW     Approved and electronically signed by:  Nickolas Boggs MD

## 2018-09-29 ENCOUNTER — APPOINTMENT (OUTPATIENT)
Dept: MRI IMAGING | Facility: CLINIC | Age: 73
End: 2018-09-29
Attending: FAMILY MEDICINE
Payer: MEDICARE

## 2018-09-29 ENCOUNTER — APPOINTMENT (OUTPATIENT)
Dept: PHYSICAL THERAPY | Facility: CLINIC | Age: 73
End: 2018-09-29
Payer: MEDICARE

## 2018-09-29 ENCOUNTER — APPOINTMENT (OUTPATIENT)
Dept: OCCUPATIONAL THERAPY | Facility: CLINIC | Age: 73
End: 2018-09-29
Payer: MEDICARE

## 2018-09-29 ENCOUNTER — APPOINTMENT (OUTPATIENT)
Dept: CT IMAGING | Facility: CLINIC | Age: 73
End: 2018-09-29
Attending: FAMILY MEDICINE
Payer: MEDICARE

## 2018-09-29 PROBLEM — J06.9 VIRAL UPPER RESPIRATORY ILLNESS: Status: ACTIVE | Noted: 2018-09-29

## 2018-09-29 PROBLEM — M48.062 SPINAL STENOSIS OF LUMBAR REGION WITH NEUROGENIC CLAUDICATION: Status: ACTIVE | Noted: 2018-09-29

## 2018-09-29 PROBLEM — E78.5 HYPERLIPIDEMIA LDL GOAL <130: Status: ACTIVE | Noted: 2018-09-29

## 2018-09-29 PROBLEM — R29.898 WEAKNESS OF BOTH LOWER EXTREMITIES: Status: ACTIVE | Noted: 2018-09-29

## 2018-09-29 PROBLEM — R29.898 LEG WEAKNESS, BILATERAL: Status: ACTIVE | Noted: 2018-09-29

## 2018-09-29 LAB
ALBUMIN UR-MCNC: NEGATIVE MG/DL
ANION GAP SERPL CALCULATED.3IONS-SCNC: 9 MMOL/L (ref 3–14)
APPEARANCE UR: ABNORMAL
BILIRUB UR QL STRIP: NEGATIVE
BUN SERPL-MCNC: 16 MG/DL (ref 7–30)
CALCIUM SERPL-MCNC: 8.4 MG/DL (ref 8.5–10.1)
CHLORIDE SERPL-SCNC: 108 MMOL/L (ref 94–109)
CO2 SERPL-SCNC: 24 MMOL/L (ref 20–32)
COLOR UR AUTO: YELLOW
CREAT SERPL-MCNC: 0.79 MG/DL (ref 0.66–1.25)
ERYTHROCYTE [DISTWIDTH] IN BLOOD BY AUTOMATED COUNT: 12.3 % (ref 10–15)
GFR SERPL CREATININE-BSD FRML MDRD: >90 ML/MIN/1.7M2
GLUCOSE SERPL-MCNC: 200 MG/DL (ref 70–99)
GLUCOSE UR STRIP-MCNC: NEGATIVE MG/DL
HCT VFR BLD AUTO: 36.8 % (ref 40–53)
HGB BLD-MCNC: 12.4 G/DL (ref 13.3–17.7)
HGB UR QL STRIP: NEGATIVE
KETONES UR STRIP-MCNC: NEGATIVE MG/DL
LEUKOCYTE ESTERASE UR QL STRIP: NEGATIVE
MCH RBC QN AUTO: 30.1 PG (ref 26.5–33)
MCHC RBC AUTO-ENTMCNC: 33.7 G/DL (ref 31.5–36.5)
MCV RBC AUTO: 89 FL (ref 78–100)
MUCOUS THREADS #/AREA URNS LPF: PRESENT /LPF
NITRATE UR QL: NEGATIVE
PH UR STRIP: 5 PH (ref 5–7)
PLATELET # BLD AUTO: 140 10E9/L (ref 150–450)
POTASSIUM SERPL-SCNC: 4.1 MMOL/L (ref 3.4–5.3)
RBC # BLD AUTO: 4.12 10E12/L (ref 4.4–5.9)
RBC #/AREA URNS AUTO: 1 /HPF (ref 0–2)
SODIUM SERPL-SCNC: 141 MMOL/L (ref 133–144)
SOURCE: ABNORMAL
SP GR UR STRIP: 1.02 (ref 1–1.03)
UROBILINOGEN UR STRIP-MCNC: 2 MG/DL (ref 0–2)
WBC # BLD AUTO: 4.8 10E9/L (ref 4–11)
WBC #/AREA URNS AUTO: 1 /HPF (ref 0–5)

## 2018-09-29 PROCEDURE — 87040 BLOOD CULTURE FOR BACTERIA: CPT | Mod: 91 | Performed by: FAMILY MEDICINE

## 2018-09-29 PROCEDURE — 72148 MRI LUMBAR SPINE W/O DYE: CPT

## 2018-09-29 PROCEDURE — G0378 HOSPITAL OBSERVATION PER HR: HCPCS

## 2018-09-29 PROCEDURE — 81001 URINALYSIS AUTO W/SCOPE: CPT | Performed by: FAMILY MEDICINE

## 2018-09-29 PROCEDURE — 96376 TX/PRO/DX INJ SAME DRUG ADON: CPT

## 2018-09-29 PROCEDURE — 99219 ZZC INITIAL OBSERVATION CARE,LEVL II: CPT | Mod: AI | Performed by: FAMILY MEDICINE

## 2018-09-29 PROCEDURE — 25000128 H RX IP 250 OP 636: Performed by: FAMILY MEDICINE

## 2018-09-29 PROCEDURE — 85027 COMPLETE CBC AUTOMATED: CPT | Performed by: FAMILY MEDICINE

## 2018-09-29 PROCEDURE — 96374 THER/PROPH/DIAG INJ IV PUSH: CPT | Performed by: FAMILY MEDICINE

## 2018-09-29 PROCEDURE — 70450 CT HEAD/BRAIN W/O DYE: CPT

## 2018-09-29 PROCEDURE — 25000132 ZZH RX MED GY IP 250 OP 250 PS 637: Mod: GY | Performed by: FAMILY MEDICINE

## 2018-09-29 PROCEDURE — A9270 NON-COVERED ITEM OR SERVICE: HCPCS | Mod: GY | Performed by: FAMILY MEDICINE

## 2018-09-29 PROCEDURE — 25000125 ZZHC RX 250: Performed by: FAMILY MEDICINE

## 2018-09-29 PROCEDURE — 36415 COLL VENOUS BLD VENIPUNCTURE: CPT | Performed by: FAMILY MEDICINE

## 2018-09-29 PROCEDURE — 97162 PT EVAL MOD COMPLEX 30 MIN: CPT | Mod: GP | Performed by: PHYSICAL THERAPIST

## 2018-09-29 PROCEDURE — 40000133 ZZH STATISTIC OT WARD VISIT

## 2018-09-29 PROCEDURE — 40000193 ZZH STATISTIC PT WARD VISIT: Performed by: PHYSICAL THERAPIST

## 2018-09-29 PROCEDURE — 80048 BASIC METABOLIC PNL TOTAL CA: CPT | Performed by: FAMILY MEDICINE

## 2018-09-29 PROCEDURE — 97165 OT EVAL LOW COMPLEX 30 MIN: CPT | Mod: GO

## 2018-09-29 PROCEDURE — 96361 HYDRATE IV INFUSION ADD-ON: CPT

## 2018-09-29 PROCEDURE — 97116 GAIT TRAINING THERAPY: CPT | Mod: GP | Performed by: PHYSICAL THERAPIST

## 2018-09-29 RX ORDER — DONEPEZIL HYDROCHLORIDE 5 MG/1
5 TABLET, FILM COATED ORAL AT BEDTIME
Status: DISCONTINUED | OUTPATIENT
Start: 2018-09-29 | End: 2018-10-01 | Stop reason: HOSPADM

## 2018-09-29 RX ORDER — ONDANSETRON 4 MG/1
4 TABLET, ORALLY DISINTEGRATING ORAL EVERY 6 HOURS PRN
Status: DISCONTINUED | OUTPATIENT
Start: 2018-09-29 | End: 2018-10-01 | Stop reason: HOSPADM

## 2018-09-29 RX ORDER — NALOXONE HYDROCHLORIDE 0.4 MG/ML
.1-.4 INJECTION, SOLUTION INTRAMUSCULAR; INTRAVENOUS; SUBCUTANEOUS
Status: DISCONTINUED | OUTPATIENT
Start: 2018-09-29 | End: 2018-10-01 | Stop reason: HOSPADM

## 2018-09-29 RX ORDER — OXYCODONE HYDROCHLORIDE 5 MG/1
5-10 TABLET ORAL
Status: DISCONTINUED | OUTPATIENT
Start: 2018-09-29 | End: 2018-10-01 | Stop reason: HOSPADM

## 2018-09-29 RX ORDER — METHYLPREDNISOLONE SODIUM SUCCINATE 125 MG/2ML
125 INJECTION, POWDER, LYOPHILIZED, FOR SOLUTION INTRAMUSCULAR; INTRAVENOUS ONCE
Status: COMPLETED | OUTPATIENT
Start: 2018-09-29 | End: 2018-09-29

## 2018-09-29 RX ORDER — ACETAMINOPHEN 650 MG/1
650 SUPPOSITORY RECTAL EVERY 4 HOURS PRN
Status: DISCONTINUED | OUTPATIENT
Start: 2018-09-29 | End: 2018-10-01 | Stop reason: HOSPADM

## 2018-09-29 RX ORDER — METHYLPREDNISOLONE SODIUM SUCCINATE 125 MG/2ML
60 INJECTION, POWDER, LYOPHILIZED, FOR SOLUTION INTRAMUSCULAR; INTRAVENOUS EVERY 6 HOURS
Status: DISCONTINUED | OUTPATIENT
Start: 2018-09-29 | End: 2018-10-01 | Stop reason: HOSPADM

## 2018-09-29 RX ORDER — POLYETHYLENE GLYCOL 3350 17 G/17G
17 POWDER, FOR SOLUTION ORAL DAILY PRN
Status: DISCONTINUED | OUTPATIENT
Start: 2018-09-29 | End: 2018-10-01 | Stop reason: HOSPADM

## 2018-09-29 RX ORDER — AMOXICILLIN 250 MG
2 CAPSULE ORAL 2 TIMES DAILY PRN
Status: DISCONTINUED | OUTPATIENT
Start: 2018-09-29 | End: 2018-10-01 | Stop reason: HOSPADM

## 2018-09-29 RX ORDER — PROCHLORPERAZINE 25 MG
12.5 SUPPOSITORY, RECTAL RECTAL EVERY 12 HOURS PRN
Status: DISCONTINUED | OUTPATIENT
Start: 2018-09-29 | End: 2018-10-01 | Stop reason: HOSPADM

## 2018-09-29 RX ORDER — LORAZEPAM 0.5 MG/1
.25-.5 TABLET ORAL EVERY 4 HOURS PRN
Status: DISCONTINUED | OUTPATIENT
Start: 2018-09-29 | End: 2018-10-01 | Stop reason: HOSPADM

## 2018-09-29 RX ORDER — AMOXICILLIN 250 MG
1 CAPSULE ORAL 2 TIMES DAILY PRN
Status: DISCONTINUED | OUTPATIENT
Start: 2018-09-29 | End: 2018-10-01 | Stop reason: HOSPADM

## 2018-09-29 RX ORDER — LOSARTAN POTASSIUM 50 MG/1
100 TABLET ORAL DAILY
Status: DISCONTINUED | OUTPATIENT
Start: 2018-09-29 | End: 2018-10-01 | Stop reason: HOSPADM

## 2018-09-29 RX ORDER — ASPIRIN 81 MG/1
81 TABLET ORAL DAILY
Status: DISCONTINUED | OUTPATIENT
Start: 2018-09-29 | End: 2018-10-01 | Stop reason: HOSPADM

## 2018-09-29 RX ORDER — LIDOCAINE 40 MG/G
CREAM TOPICAL
Status: DISCONTINUED | OUTPATIENT
Start: 2018-09-29 | End: 2018-10-01 | Stop reason: HOSPADM

## 2018-09-29 RX ORDER — ONDANSETRON 2 MG/ML
4 INJECTION INTRAMUSCULAR; INTRAVENOUS EVERY 6 HOURS PRN
Status: DISCONTINUED | OUTPATIENT
Start: 2018-09-29 | End: 2018-10-01 | Stop reason: HOSPADM

## 2018-09-29 RX ORDER — ACETAMINOPHEN 325 MG/1
650 TABLET ORAL EVERY 4 HOURS PRN
Status: DISCONTINUED | OUTPATIENT
Start: 2018-09-29 | End: 2018-10-01 | Stop reason: HOSPADM

## 2018-09-29 RX ORDER — PROCHLORPERAZINE MALEATE 5 MG
5 TABLET ORAL EVERY 6 HOURS PRN
Status: DISCONTINUED | OUTPATIENT
Start: 2018-09-29 | End: 2018-10-01 | Stop reason: HOSPADM

## 2018-09-29 RX ADMIN — METHYLPREDNISOLONE SODIUM SUCCINATE 125 MG: 125 INJECTION, POWDER, FOR SOLUTION INTRAMUSCULAR; INTRAVENOUS at 02:04

## 2018-09-29 RX ADMIN — SODIUM CHLORIDE 1000 ML: 9 INJECTION, SOLUTION INTRAVENOUS at 01:24

## 2018-09-29 RX ADMIN — ACETAMINOPHEN 650 MG: 325 TABLET ORAL at 03:32

## 2018-09-29 RX ADMIN — METHYLPREDNISOLONE SODIUM SUCCINATE 62.5 MG: 125 INJECTION, POWDER, FOR SOLUTION INTRAMUSCULAR; INTRAVENOUS at 19:43

## 2018-09-29 RX ADMIN — ASPIRIN 81 MG: 81 TABLET, COATED ORAL at 09:13

## 2018-09-29 RX ADMIN — METHYLPREDNISOLONE SODIUM SUCCINATE 62.5 MG: 125 INJECTION, POWDER, FOR SOLUTION INTRAMUSCULAR; INTRAVENOUS at 08:21

## 2018-09-29 RX ADMIN — LORAZEPAM 0.25 MG: 0.5 TABLET ORAL at 03:32

## 2018-09-29 RX ADMIN — IPRATROPIUM BROMIDE AND ALBUTEROL SULFATE 3 ML: .5; 2.5 SOLUTION RESPIRATORY (INHALATION) at 00:44

## 2018-09-29 RX ADMIN — METHYLPREDNISOLONE SODIUM SUCCINATE 62.5 MG: 125 INJECTION, POWDER, FOR SOLUTION INTRAMUSCULAR; INTRAVENOUS at 13:22

## 2018-09-29 RX ADMIN — LOSARTAN POTASSIUM 100 MG: 50 TABLET, FILM COATED ORAL at 09:13

## 2018-09-29 RX ADMIN — DONEPEZIL HYDROCHLORIDE 5 MG: 5 TABLET ORAL at 21:50

## 2018-09-29 RX ADMIN — SODIUM CHLORIDE 1000 ML: 9 INJECTION, SOLUTION INTRAVENOUS at 18:11

## 2018-09-29 ASSESSMENT — ENCOUNTER SYMPTOMS
WOUND: 0
DYSURIA: 0
FLANK PAIN: 0
FEVER: 0
WEAKNESS: 1
PALPITATIONS: 0
NECK PAIN: 0
DIAPHORESIS: 0
NUMBNESS: 0
COUGH: 1
FATIGUE: 1
BACK PAIN: 1
EYES NEGATIVE: 1
SHORTNESS OF BREATH: 1
ABDOMINAL PAIN: 0
CHILLS: 1
APPETITE CHANGE: 1
ACTIVITY CHANGE: 1
HEADACHES: 0
CONFUSION: 1
NECK STIFFNESS: 0

## 2018-09-29 ASSESSMENT — ACTIVITIES OF DAILY LIVING (ADL): PREVIOUS_RESPONSIBILITIES: MEAL PREP;HOUSEKEEPING;LAUNDRY;SHOPPING;YARDWORK;FINANCES;MEDICATION MANAGEMENT;DRIVING

## 2018-09-29 NOTE — PLAN OF CARE
Problem: Patient Care Overview  Goal: Plan of Care/Patient Progress Review  Temp: 96.3  F (35.7  C) Temp src: Oral BP: 142/78 Pulse: 98   Resp: 18 SpO2: 95 % O2 Device: None (Room air)     Pt is forgetful and confused at times.Pt has been afebrile today. Denies SOB, Pt is not steady on feet requires 1-2 assist. Resp even and unlabored. CT and MRI complete of Lumbar spine. No signs of distress noted will cont to monitor

## 2018-09-29 NOTE — ASSESSMENT & PLAN NOTE
Noted previously on MRI having finished a course of PT and received spinal steroid injections  Worsening symptoms with increased weakness of the legs over the past 2 days  CT showing severe lumbar stenosis  Per recommendation of neurosurgery, will be given IV steroids and will order MRI for evaluation later to this morning  Neurosurgery to call back regarding next steps

## 2018-09-29 NOTE — PROGRESS NOTES
Patient spiked a temp of 101.4 last evening but it resolved without intervention and no signs of acute illness other than ongoing mild cough.  Blood cultures were drawn this morning x2.  Repeat WBC shows ongoing normal WBC.  MRI of the back was unchanged from 2 months ago.  Patient did work with physical therapy and occupational therapy and there is concern for intercranial abnormality with patient having issues with visual impairment, balance impairment.  Per family, some of this has been present since his eye surgery (apparently he had a surgical procedure which allows one eye to focus near and the other to focus far) but they did note worsening as well as confusion and a left leg weakness only for the past 3 days.  Will continue with observation stay and monitor for any recurrent fever or worsening confusion.  Will perform CT of the head tonight and evaluate further with MRI of the brain tomorrow.  Will continue to have patient work with physical therapy and occupational therapy to help determine appropriate discharge plan as more is known.      Electronically Signed:  Crissy Laird MD

## 2018-09-29 NOTE — ED TRIAGE NOTES
Patient is here with his son's. They noticed confusion and weakness last evening that they thought improved after eating. Patient reported falling tonight, not able to stand, and fatigue.

## 2018-09-29 NOTE — ED NOTES
ED Nursing criteria listed below was addressed during verbal handoff:     Abnormal vitals: No  Abnormal results: Yes Stenosis per MRI   Med Reconciliation completed: Yes  Meds given in ED: Yes  Any Overdue Meds: No  Core Measures: No  Isolation: No  Special needs: Yes Fall risk  Skin assessment: Yes    Observation Patient  Education provided: Yes

## 2018-09-29 NOTE — ASSESSMENT & PLAN NOTE
Worsening over the past 2 days now to the point that he cannot stand safely by himself  Concern is that this could be secondary to severe lumbar stenosis, exacerbated by current URI   Per recommendation of neurosurgery, will bring in, has been given IV steroids and will complete MRI in a.m.  PT/OT evaluation has been ordered  Neurosurgery to call in later today to reassess and determine whether he should be transferred for consideration of possible back surgery

## 2018-09-29 NOTE — ED PROVIDER NOTES
History     Chief Complaint   Patient presents with     Generalized Weakness     HPI  Duane Tegg is a 73 year old male who presented to the emergency room with his 2 sons and daughter-in-law secondary to concerns of increased weakness and some confusion over the last few days.  Patient also admits to a cough this been present for last 3-4 days.  Patient states that he has had increased weakness into his legs bilaterally to the point where he cannot walk or get up from bed.  The patient's son reports that he has had a history for low back problems and had some spinal injections performed about 6 weeks ago which seemed to help him with his ambulation ability for about a week or so but it has seemingly wore off and he has had increasing weakness to lower extremity since.  The weakness has become much more pronounced over the last few days to the point where he can get out of bed any longer.  Patient denies any incontinence of stool or bladder however his son states that he thinks that he has had some leakage of urine probably because he cannot get out of bed.  Patient admits to cough this been present for last week.  He states the cough is nonproductive.  Denies any specific chest pain associated with cough.  He denies any abdominal pain symptoms.  She does live alone but his 2 sons and they check on him often.  The patient has a history for dementia and is on Aricept for last couple years for this.  Past medical history is somewhat difficult as the patient is a poor historian.    I reviewed a neurosurgery consult note from 7/22/2018 in the [facility] EMR.  I also found the following MRI scan report and copied it below:  MR LUMBAR SPINE WITHOUT CONTRAST  7/12/2018 1:42 PM     HISTORY:  Chronic bilateral low back pain with left-sided sciatica.     TECHNIQUE: Sagittal T1 and T2, sagittal IR, and transverse proton  density and T2-weighted pulse sequences.     FINDINGS: Five lumbar vertebrae are assumed. Apophyseal  joint  degenerative arthrosis is noted in the mid and lower lumbar spine,  most advanced at L4-L5, with minimal degenerative spondylolisthesis  but no periarticular marrow edema. Degenerative loss of disc signal  with normal disc height is present throughout the lumbar spine.  Vertebral body heights are normal. The conus medullaris is  unremarkable in appearance on the sagittal images.      L1-L2: Minimal if any annular bulge. No central or foraminal stenosis.     L2-L3: Mild lateral annular bulging with minimal foraminal narrowing  below the exiting nerve roots, right greater than left. No central  stenosis.     L3-L4: Mild lateral annular bulging and minimal foraminal narrowing  below the exiting nerve roots. No central stenosis.     L4-L5: Mild annular bulge, degenerative spondylolisthesis, and  ligamentum flavum thickening result in severe central stenosis.  Moderate to severe left and mild right foraminal stenosis.     L5-S1: Annular bulge with moderate left and mild right foraminal  stenosis. No central stenosis.         IMPRESSION:  1. 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  2. L4-L5 severe central stenosis.  3. Multilevel foraminal stenosis, greatest on the left at L4-L5 and  L5-S1.     DAVID COLEY MD    Problem List:    Patient Active Problem List    Diagnosis Date Noted     Weakness of both lower extremities 09/29/2018     Priority: Medium     Spinal stenosis of lumbar region with neurogenic claudication 09/29/2018     Priority: Medium     Viral upper respiratory illness 09/29/2018     Priority: Medium     Hyperlipidemia LDL goal <130 09/29/2018     Priority: Medium     Lumbago 08/10/2018     Priority: Medium     Dementia without behavioral disturbance, unspecified dementia type 01/08/2017     Priority: Medium     Mild cognitive impairment 12/26/2015     Priority: Medium     Essential hypertension 12/07/2015     Priority:  Medium     Right bundle branch block 11/26/2015     Priority: Medium        Past Medical History:    History reviewed. No pertinent past medical history.    Past Surgical History:    Past Surgical History:   Procedure Laterality Date     COLONOSCOPY N/A 1/6/2016    Procedure: COMBINED COLONOSCOPY, SINGLE OR MULTIPLE BIOPSY/POLYPECTOMY BY BIOPSY;  Surgeon: Ryan Nicholas MD;  Location:  GI       Family History:    Family History   Problem Relation Age of Onset     Diabetes Father        Social History:  Marital Status:  Single [1]  Social History   Substance Use Topics     Smoking status: Former Smoker     Smokeless tobacco: Never Used     Alcohol use Yes        Medications:      atorvastatin (LIPITOR) 20 MG tablet   donepezil (ARICEPT) 5 MG tablet   losartan (COZAAR) 100 MG tablet   aspirin 81 MG tablet   diazepam (VALIUM) 10 MG tablet   order for DME         Review of Systems   Constitutional: Positive for activity change, appetite change (His daughter in law states that he has not eaten for last 2 days.  He gets meals delivered to his home and they have noticed that he does not touch them.  Patient states he just does not feel like eating.), chills and fatigue. Negative for diaphoresis and fever.   HENT: Negative.    Eyes: Negative.    Respiratory: Positive for cough and shortness of breath.    Cardiovascular: Negative for chest pain, palpitations and leg swelling.   Gastrointestinal: Negative for abdominal pain.   Genitourinary: Negative for dysuria and flank pain.   Musculoskeletal: Positive for back pain (Patient with chronic back pain to the low back with recent spinal injections performed about 6 weeks ago which helped him with his mobility and pain for a week or so.) and gait problem (Patient states his legs are too weak to support him and he cannot get out of bed any longer over the last 2 days.). Negative for neck pain and neck stiffness.   Skin: Negative for rash and wound.   Neurological: Positive for  weakness (Lower extremities bilaterally.). Negative for numbness and headaches.   Psychiatric/Behavioral: Positive for confusion (History of mild dementia symptoms currently being treated with Aricept.).   All other systems reviewed and are negative.      Physical Exam   BP: 132/78  Pulse: 102  Temp: 98.7  F (37.1  C)  Resp: 20  SpO2: 95 %      Physical Exam   Constitutional: He is oriented to person, place, and time. He appears well-developed and well-nourished. He appears distressed (Mild distress secondary to lower extremity weakness.).   HENT:   Head: Normocephalic and atraumatic.   Dry oral mucosa.   Eyes: Conjunctivae and EOM are normal. Pupils are equal, round, and reactive to light.   Neck: Normal range of motion. Neck supple.   Cardiovascular: Regular rhythm, normal heart sounds, intact distal pulses and normal pulses.  Tachycardia present.    Pulmonary/Chest: He is in respiratory distress (Dry cough noted during the exam.). He has decreased breath sounds in the right lower field, the left middle field and the left lower field. He has wheezes. He has rhonchi. He has no rales.   Abdominal: Soft. Normal appearance. He exhibits no pulsatile midline mass and no mass. Bowel sounds are decreased. There is no tenderness.   Musculoskeletal:        Right shoulder: He exhibits tenderness and spasm.        Lumbar back: He exhibits tenderness, pain and spasm. He exhibits no bony tenderness.   Neurological: He is alert and oriented to person, place, and time. He displays no atrophy and no tremor. No cranial nerve deficit or sensory deficit. He exhibits abnormal muscle tone. Coordination and gait abnormal.   Patient with bilateral lower leg weakness   Skin: Skin is warm and intact. No bruising, no ecchymosis and no rash noted. He is not diaphoretic.   Psychiatric: He has a normal mood and affect. His speech is normal and behavior is normal. Thought content normal. Thought content is not paranoid and not delusional.  Cognition and memory are impaired.   Nursing note and vitals reviewed.      ED Course     ED Course     Procedures               Critical Care time:  none               Results for orders placed or performed during the hospital encounter of 09/28/18 (from the past 24 hour(s))   CBC with platelets differential   Result Value Ref Range    WBC 7.9 4.0 - 11.0 10e9/L    RBC Count 4.25 (L) 4.4 - 5.9 10e12/L    Hemoglobin 12.9 (L) 13.3 - 17.7 g/dL    Hematocrit 37.9 (L) 40.0 - 53.0 %    MCV 89 78 - 100 fl    MCH 30.4 26.5 - 33.0 pg    MCHC 34.0 31.5 - 36.5 g/dL    RDW 12.3 10.0 - 15.0 %    Platelet Count 146 (L) 150 - 450 10e9/L    Diff Method Automated Method     % Neutrophils 65.8 %    % Lymphocytes 18.5 %    % Monocytes 12.5 %    % Eosinophils 2.4 %    % Basophils 0.5 %    % Immature Granulocytes 0.3 %    Nucleated RBCs 0 0 /100    Absolute Neutrophil 5.2 1.6 - 8.3 10e9/L    Absolute Lymphocytes 1.5 0.8 - 5.3 10e9/L    Absolute Monocytes 1.0 0.0 - 1.3 10e9/L    Absolute Basophils 0.0 0.0 - 0.2 10e9/L    Abs Immature Granulocytes 0.0 0 - 0.4 10e9/L    Absolute Nucleated RBC 0.0    Lactic acid whole blood   Result Value Ref Range    Lactic Acid 0.9 0.7 - 2.0 mmol/L   Comprehensive metabolic panel   Result Value Ref Range    Sodium 134 133 - 144 mmol/L    Potassium 3.9 3.4 - 5.3 mmol/L    Chloride 101 94 - 109 mmol/L    Carbon Dioxide 25 20 - 32 mmol/L    Anion Gap 8 3 - 14 mmol/L    Glucose 113 (H) 70 - 99 mg/dL    Urea Nitrogen 20 7 - 30 mg/dL    Creatinine 0.91 0.66 - 1.25 mg/dL    GFR Estimate 81 >60 mL/min/1.7m2    GFR Estimate If Black >90 >60 mL/min/1.7m2    Calcium 8.2 (L) 8.5 - 10.1 mg/dL    Bilirubin Total 0.9 0.2 - 1.3 mg/dL    Albumin 3.2 (L) 3.4 - 5.0 g/dL    Protein Total 7.2 6.8 - 8.8 g/dL    Alkaline Phosphatase 107 40 - 150 U/L    ALT 52 0 - 70 U/L    AST 40 0 - 45 U/L   Lipase   Result Value Ref Range    Lipase 94 73 - 393 U/L   CRP inflammation   Result Value Ref Range    CRP Inflammation 63.1 (H) 0.0 -  8.0 mg/L   UA reflex to Microscopic and Culture   Result Value Ref Range    Color Urine Yellow     Appearance Urine Slightly Cloudy     Glucose Urine Negative NEG^Negative mg/dL    Bilirubin Urine Negative NEG^Negative    Ketones Urine Negative NEG^Negative mg/dL    Specific Gravity Urine 1.025 1.003 - 1.035    Blood Urine Negative NEG^Negative    pH Urine 5.0 5.0 - 7.0 pH    Protein Albumin Urine Negative NEG^Negative mg/dL    Urobilinogen mg/dL 2.0 0.0 - 2.0 mg/dL    Nitrite Urine Negative NEG^Negative    Leukocyte Esterase Urine Negative NEG^Negative    Source Unspecified Urine     RBC Urine 1 0 - 2 /HPF    WBC Urine 1 0 - 5 /HPF    Mucous Urine Present (A) NEG^Negative /LPF   XR Chest 2 Views    Narrative    CHEST 2 VIEWS  9/29/2018 12:39 AM     HISTORY: Cough. Weakness.    COMPARISON: None.    FINDINGS: A linear opacity in the right lung base most likely  represents atelectasis or scarring. The lungs are otherwise clear.  Normal-sized cardiac silhouette. Atherosclerotic calcification in the  thoracic aorta.      Impression    IMPRESSION: No convincing evidence of active cardiopulmonary disease.    JUANA GARCIA MD       Medications   0.9% sodium chloride BOLUS (0 mLs Intravenous Stopped 9/29/18 0123)     Followed by   sodium chloride 0.9% infusion (1,000 mLs Intravenous New Bag 9/29/18 0124)   ipratropium - albuterol 0.5 mg/2.5 mg/3 mL (DUONEB) neb solution 3 mL (3 mLs Nebulization Given 9/29/18 0044)   methylPREDNISolone sodium succinate (solu-MEDROL) injection 125 mg (125 mg Intravenous Given 9/29/18 0204)       Assessments & Plan (with Medical Decision Making)  73-year-old male to the emergency room secondary to increased weakness to bilateral lower legs for the last several days to the point where he can no longer walk and get out of bed.  Patient with a history for known spinal stenosis to L4- L5 region of the lumbar spine.  Patient received a epidural injection approximately 6-8 weeks ago.  The patient  is also with exam findings consistent with viral bronchitis.  I suspect the coughing is made his lumbar spinal stenosis more severe resulting in the increased weakness to lower extremities bilaterally.  I spoke with neurosurgeon on-call, Dr. Sydney Juares, and she recommended patient be admitted to the hospital with steroid treatment and PT and OT evaluation.  MRI scan will be planned for later this morning when they are available in the hospital.  Dr. Sydney Juares stated she would call the hospital inquiring on the patient's condition later this morning and make further recommendations at that time.I spoke to Dr. Boggs, hospitalist, who agrees to take over the care of the patient and came to the ER to evaluate the patient and place orders for the hospital floor in the [facility] EMR.       I have reviewed the nursing notes.    I have reviewed the findings, diagnosis, plan and need for additional period of evaluation and treatment with the patient's family.     Final diagnoses:   Spinal stenosis at L4-L5 level   Weakness of both lower extremities   Viral upper respiratory illness   Spinal stenosis of lumbar region with neurogenic claudication   Mild cognitive impairment   Essential hypertension       9/28/2018   [facility] EMERGENCY DEPARTMENT     Ryan Benitez, DO  09/29/18 0257

## 2018-09-29 NOTE — PLAN OF CARE
Discharge Planner OT   Patient plan for discharge: Home  Current status: Pt currently requires min assist or CGA for all ADL/IADL tasks due to impaired balance, cognition, and decreased awareness. Pt frequently loses balance during daily tasks and requires min assist for correction. Pt scored a 15/30 on the [employer] cognitive screen, indicating moderate-severe cognitive impairment. Pt has a dx of dementia with unknown cognitive baseline. Pt also demonstrated impairments with memory, command following, insight, and personal safety. Pt was an inconsistent historian this date and provided multiple/changing answers to questions. Pt demonstrated impaired visual pursuits, saccades, and convergence. Sporatic tracking of moving objects with eye jumping noted. Convergence not present. Decreased efficiency with saccades.   Barriers to return to prior living situation: Cognition; decreased balance; decreased awareness/orientation  Recommendations for discharge: TCU  Rationale for recommendations: Pt would benefit from further skilled OT services within the TCU setting to increase independence with ADLs and progress toward prior level of function, and for further cognitive testing/monitoring to assess pt's safety to return home as pt lives alone. Based on pt's current cognitive level of function, pt would benefit from 24/7 supervision/assist with all ADL/IADL tasks.       Entered by: Catrina Currie 09/29/2018 12:12 PM

## 2018-09-29 NOTE — PROGRESS NOTES
09/29/18 1400   Quick Adds   Type of Visit Initial PT Evaluation   Living Environment   Lives With alone   Living Arrangements house   Home Accessibility bed and bath on same level   Number of Stairs to Enter Home 2   Number of Stairs Within Home 0   Living Environment Comment Pt was an inconsistent historian during evaluation this date. Pt reports he lives alone and did not receive assist with any ADL/IADL tasks, however did state his sons check in on him frequently. Pt stated he drives, and later stated he no longer drives. No family present during evaluation. Unable to determine full living environment history and prior functional level at this time   Functional Level Prior   Ambulation 0-->independent   Transferring 0-->independent   Toileting 0-->independent   Bathing 0-->independent   Dressing 0-->independent   Eating 0-->independent   Communication 0-->understands/communicates without difficulty   Swallowing 0-->swallows foods/liquids without difficulty   Cognition 2 - difficulty with organizing thoughts  (baseline dementia)   Fall history within last six months yes   Number of times patient has fallen within last six months 1   Which of the above functional risks had a recent onset or change? ambulation;transferring;cognition;fall history   General Information   Onset of Illness/Injury or Date of Surgery - Date 09/28/18   Referring Physician    Patient/Family Goals Statement pt does not state   Pertinent History of Current Problem (include personal factors and/or comorbidities that impact the POC) weakness issues, difficulties with amb   Precautions/Limitations fall precautions   Cognitive Status Examination   Orientation place   Level of Consciousness alert   Follows Commands and Answers Questions 75% of the time   Personal Safety and Judgment impaired   Memory impaired   Cognitive Comment Patient is slow to answer questions and is often confused by the questions asked, very slow to answer   Pain  "Assessment   Patient Currently in Pain No   Range of Motion (ROM)   ROM Comment WNL   Strength   Strength Comments WNL   Bed Mobility   Bed Mobility Comments supervision   Transfer Skills   Transfer Comments Mod A of 1, 4x LOB   Gait   Gait Comments Amb 100 ft CG A needed assist 4x with LOB.    Balance   Balance Comments poor, pt denies any spinning of the room   Coordination   Coordination Comments poor, when trying to turn to go back where he came from had LOB, stand to sit LOB   General Therapy Interventions   Planned Therapy Interventions balance training;gait training;transfer training   Clinical Impression   Criteria for Skilled Therapeutic Intervention yes, treatment indicated   PT Diagnosis balance is poor, gait difficulties and signs of dementia. possibly central nervous system problem.    Influenced by the following impairments dementia   Functional limitations due to impairments cannot wallk alone, cannot transfer alone   Clinical Presentation Stable/Uncomplicated   Clinical Presentation Rationale not quit sure why pt has the recent [facility]   Clinical Decision Making (Complexity) Moderate complexity   Therapy Frequency` daily   Predicted Duration of Therapy Intervention (days/wks) 4 days   Anticipated Discharge Disposition (pt at this point requires 24 - 7 supervision)   Risk & Benefits of therapy have been explained Yes   Patient, Family & other staff in agreement with plan of care Yes   Clinical Impression Comments Patient has great mm tone, good strength, but is requiring supervision and CGA when up because of sudden LOB   [facility] [employer] TM \"6 Clicks\"   2016, Trustees of [facility], under license to [employer].  All rights reserved.   6 Clicks Short Forms Basic Mobility Inpatient Short Form   [facility] [employer]  \"6 Clicks\" V.2 Basic Mobility Inpatient Short Form   1. Turning from your back to your side while in a flat bed without using bedrails? 3 - A Little   2. Moving from lying " on your back to sitting on the side of a flat bed without using bedrails? 3 - A Little   3. Moving to and from a bed to a chair (including a wheelchair)? 3 - A Little   4. Standing up from a chair using your arms (e.g., wheelchair, or bedside chair)? 2 - A Lot   5. To walk in hospital room? 2 - A Lot   6. Climbing 3-5 steps with a railing? 2 - A Lot   Basic Mobility Raw Score (Score out of 24.Lower scores equate to lower levels of function) 15   Total Evaluation Time   Total Evaluation Time (Minutes) 15   Thank you for the referral,            Aileen Brown PT

## 2018-09-29 NOTE — PLAN OF CARE
Problem: Patient Care Overview  Goal: Plan of Care/Patient Progress Review  Discharge Planner PT   Patient plan for discharge: TCU, depending on balance and cognition  Current status: Patient has moderately poor balance, needed assist 4x to recover balance in 50ft.   Barriers to return to prior living situation: Balance and cognition  Recommendations for discharge: This to be determined after further PT and testing. Talked to DR about concerns about LOB.   Rationale for recommendations: Patient is losing balance and would potentially fall if PT was not holding onto pt and provided assist.        Entered by: Aileen Brown 09/29/2018 2:21 PM

## 2018-09-29 NOTE — PROGRESS NOTES
S-(situation): Patient registered to Observation. Patient arrived to room 255 via cart from ED    B-(background): lower extremetiy weakness, lumbar stenosis    A-(assessment): Temp 101.4, all other vitals stable. Pt very restless in bed    R-(recommendations): Orders and observation goals reviewed with patient.    Nursing Observation criteria listed below was met:    Skin issues/needs documented:NA  Isolation needs addressed, if appropriate: NA  Fall Prevention: Education given and documented: Yes  Education Assessment documented:Yes  Education Documented (Pre-existing chronic infection such as, MRSA/VRE need education on admission): Yes  Medication Reconciliation Complete: Yes  New medication patient education completed and documented (Possible Side Effects of Common Medications handout): Yes  Home medications if not able to send immediately home with family stored here: NA  Reminder note placed in discharge instructions: NA  Patient has discharge needs (If yes, please explain): Yes, safe disposition

## 2018-09-29 NOTE — ED NOTES
Pt unable to provide urine sample at this time.  Spoke with pt about after fluids are completed, attempting then.  Family at bedside.

## 2018-09-29 NOTE — H&P
[facility]    History and Physical  Hospitalist       Date of Admission:  9/28/2018    Assessment & Plan   Duane Tegg is a 73 year old male who presents with increasing weakness over the past 2 days.  He lives alone and currently is not able to safely stand by himself and care for self.  Some of the symptoms have been associated with upper respiratory symptoms for the past 2 days with a dry cough but more concerning is persistent low back pain with a history of severe lumbar stenosis.  He has had outpatient physical therapy with some improvement and steroid injections, but over the past 2 days the pain is worsened with increased weakness in his legs.  In the emergency department he was afebrile with normal vital signs, slightly tachycardic with normal CBC, normal BMP, normal lactate with an elevated CRP.  Lumbar CT scan showing severe lumbar stenosis.  Case was discussed with neurosurgery on-call, Dr. Sydney Bojorquez, who is recommending IV steroids, PT/OT evaluation and MRI.  She told ER, that she will call back later today to reassess and determine whether he should be potentially transferred to [facility] for possible lumbar back surgery.  If the back is not the issue, it may be that he is not safe to go home, and may need TCU placement.  This point patient will be registered observation for further evaluation including MRI later today    Weakness of both lower extremities  Worsening over the past 2 days now to the point that he cannot stand safely by himself  Concern is that this could be secondary to severe lumbar stenosis, exacerbated by current URI   Per recommendation of neurosurgery, will bring in, has been given IV steroids and will complete MRI in a.m.  PT/OT evaluation has been ordered  Neurosurgery to call in later today to reassess and determine whether he should be transferred for consideration of possible back surgery    Spinal stenosis of lumbar region with neurogenic  claudication  Noted previously on MRI having finished a course of PT and received spinal steroid injections  Worsening symptoms with increased weakness of the legs over the past 2 days  CT showing severe lumbar stenosis  Per recommendation of neurosurgery, will be given IV steroids and will order MRI for evaluation later to this morning  Neurosurgery to call back regarding next steps    Essential hypertension  Controlled at home taking losartan  Continue    Viral upper respiratory illness  Several day history of dry cough with low-grade temps and increased fatigue  Lab work today with normal CBC and chest x-ray without any infiltrate  Symptomatic cares    Dementia without behavioral disturbance, unspecified dementia type  Lives at home alone with family stopping by  Increased confusion over the past several days, not currently safe to be at home alone  PT/OT evaluation in a.m.    Hyperlipidemia LDL goal <130  Controlled at home taking Lipitor  Continue at discharge      DVT Prophylaxis: Observation status  Code Status: Full Code  Disposition Plan   Expected discharge: Tomorrow, recommended to prior living arrangement once adequate pain management/ tolerating PO medications, safe disposition plan/ TCU bed available and MRI completed with neurosurgery recommendations done.     Entered: Nickolas Boggs MD 09/29/2018, 2:20 AM   Information in the above section will display in the discharge planner report.      Nickolas Boggs MD    Primary Care Physician   Heike Antonio    Chief Complaint   73-year-old male with increasing weakness    History is obtained from the patient, electronic health record, emergency department physician and patient's family, including 2 sons and a daughter-in-law    History of Present Illness   Duane Tegg is a 73 year old male who presents with increasing weakness.  He lives at home alone with a history of mild dementia, getting by with family stopping by.  For the past 2 days he has  been more weak with a  dry cough and fatigue, not eating well.  Family notes he is been more confused and that he is not been able to get up by himself.  Patient has a history of spinal stenosis, followed by neurosurgery.  MRI earlier this year showed severe spinal stenosis and this was followed up with spinal injections and PT therapy.  He seemed to improve somewhat.  He notes now that his pain in his lower back is worse, possibly exacerbated by the coughing.  He denies any falls although he so weak he feels like he might fall.  He said chills, denies fever.  He said decreased appetite with some nausea but no vomiting, no diarrhea and no urinary symptoms other than he is been potentially having more incontinence than baseline.    Past Medical History    I have reviewed this patient's medical history and updated it with pertinent information if needed.   History reviewed. No pertinent past medical history.    Past Surgical History   I have reviewed this patient's surgical history and updated it with pertinent information if needed.  Past Surgical History:   Procedure Laterality Date     COLONOSCOPY N/A 1/6/2016    Procedure: COMBINED COLONOSCOPY, SINGLE OR MULTIPLE BIOPSY/POLYPECTOMY BY BIOPSY;  Surgeon: Ryan Nicholas MD;  Location: [facility]       Prior to Admission Medications   Prior to Admission Medications   Prescriptions Last Dose Informant Patient Reported? Taking?   aspirin 81 MG tablet Unknown at Unknown time  No No   Sig: Take 1 tablet (81 mg) by mouth daily   atorvastatin (LIPITOR) 20 MG tablet Past Week at Unknown time  No Yes   Sig: TAKE 1 TABLET(20 MG) BY MOUTH DAILY   diazepam (VALIUM) 10 MG tablet   No No   Sig: Take 30-60 minutes before procedure.  Do not operate a vehicle after taking this medication.   donepezil (ARICEPT) 5 MG tablet Past Week at Unknown time  No Yes   Sig: TAKE 1 TABLET(5 MG) BY MOUTH AT BEDTIME   losartan (COZAAR) 100 MG tablet Past Week at Unknown time  No Yes   Sig: TAKE 1  TABLET(100 MG) BY MOUTH DAILY   order for DME   No No   Sig: Equipment being ordered: blood pressure machine and cuff   Patient not taking: Reported on 7/12/2018      Facility-Administered Medications: None     Allergies   No Known Allergies    Social History   I have reviewed this patient's social history and updated it with pertinent information if needed. Duane Tegg  reports that he has quit smoking. He has never used smokeless tobacco. He reports that he drinks alcohol. He reports that he does not use illicit drugs.    Family History   I have reviewed this patient's family history and updated it with pertinent information if needed.   Family History   Problem Relation Age of Onset     Diabetes Father        Review of Systems   The 10 point Review of Systems is negative other than noted in the HPI or here.     Physical Exam   Temp: 98.7  F (37.1  C)   BP: 120/70 Pulse: 92   Resp: 20 SpO2: 91 % O2 Device: None (Room air)    Vital Signs with Ranges  Temp:  [98.7  F (37.1  C)] 98.7  F (37.1  C)  Pulse:  [] 92  Resp:  [20] 20  BP: (119-165)/(63-86) 120/70  SpO2:  [91 %-95 %] 91 %  0 lbs 0 oz    Constitutional: Appropriate age male, lying on the gurney.  He seems somewhat impulsive and a little disoriented  Eyes: Pupils equal, reactive, EOM intact  HEENT: Nose mouth throat unremarkable  Respiratory: Lungs are clear  Cardiovascular: Regular rate and rhythm, no murmur heard  GI: Belly soft, nontender  Lymph/Hematologic: Cervical nodes negative  Genitourinary: Not examined  Skin: No lesions or rashes  Musculoskeletal: Moving arms and legs without difficulty.  No deformity.  Tender across the low lumbar area with palpation over the spinal processes.  Did not attempt to get up and put through range of motion.  Neurologic: No signs of focal deficits.  Cranial nerves are normal.  Psychiatric: He is alert oriented to place but not time.  He seems impulsive and needs to be redirected to stay in bed    Data   Data  reviewed today:  I personally reviewed the chest x-ray image(s) showing No signs of infiltrate, maybe some atelectasis at the right base.    Recent Labs  Lab 09/28/18  2321   WBC 7.9   HGB 12.9*   MCV 89   *      POTASSIUM 3.9   CHLORIDE 101   CO2 25   BUN 20   CR 0.91   ANIONGAP 8   GERI 8.2*   *   ALBUMIN 3.2*   PROTTOTAL 7.2   BILITOTAL 0.9   ALKPHOS 107   ALT 52   AST 40   LIPASE 94       Recent Results (from the past 24 hour(s))   XR Chest 2 Views    Narrative    CHEST 2 VIEWS  9/29/2018 12:39 AM     HISTORY: Cough. Weakness.    COMPARISON: None.    FINDINGS: A linear opacity in the right lung base most likely  represents atelectasis or scarring. The lungs are otherwise clear.  Normal-sized cardiac silhouette. Atherosclerotic calcification in the  thoracic aorta.      Impression    IMPRESSION: No convincing evidence of active cardiopulmonary disease.

## 2018-09-29 NOTE — ASSESSMENT & PLAN NOTE
Several day history of dry cough with low-grade temps and increased fatigue  Lab work today with normal CBC and chest x-ray without any infiltrate  Symptomatic cares

## 2018-09-29 NOTE — ASSESSMENT & PLAN NOTE
Lives at home alone with family stopping by  Increased confusion over the past several days, not currently safe to be at home alone  PT/OT evaluation in a.m.

## 2018-09-29 NOTE — PROGRESS NOTES
09/29/18 1030   Quick Adds   Type of Visit Initial Occupational Therapy Evaluation   Living Environment   Lives With alone   Living Arrangements house   Home Accessibility bed and bath on same level;tub/shower is not walk in   Number of Stairs to Enter Home 2   Number of Stairs Within Home 0   Living Environment Comment Pt was an inconsistent historian during evaluation this date. Pt reports he lives alone and did not receive assist with any ADL/IADL tasks, however did state his sons check in on him frequently. Pt stated he drives, and later stated he no longer drives. No family present during evaluation. Unable to determine full living environment history and prior functional level at this time   Self-Care   Dominant Hand right   Usual Activity Tolerance good   Current Activity Tolerance moderate   Regular Exercise no   Functional Level Prior   Ambulation 0-->independent   Transferring 0-->independent   Toileting 0-->independent   Bathing 0-->independent   Dressing 0-->independent   Eating 0-->independent   Communication 0-->understands/communicates without difficulty   Swallowing 0-->swallows foods/liquids without difficulty   Cognition 2 - difficulty with organizing thoughts  (baseline dementia)   Fall history within last six months yes   Number of times patient has fallen within last six months 1   Which of the above functional risks had a recent onset or change? ambulation;transferring;cognition;fall history   General Information   Onset of Illness/Injury or Date of Surgery - Date 09/28/18   Referring Physician Nickolas Boggs MD   Patient/Family Goals Statement Pt stated he plans to return home   Additional Occupational Profile Info/Pertinent History of Current Problem Pt is a 73 year old male being seen for OT evaluation for self care deficits and confusion. Pt presented to the emergency room with his 2 sons and daughter-in-law due to concerns of increased weakness and some confusion over the last few  days. Pt also reports some confusion, dizziness, and balance deficits over the past few months. Pt has a history of dementia. Pt reports he lives alone and did not receive assistance with any ADL/IADL tasks.   Precautions/Limitations fall precautions   Cognitive Status Examination   Orientation person;place  (oriented to place with some verbal prompts)   Level of Consciousness confused   Able to Follow Commands mild impairment   Personal Safety (Cognitive) at risk behaviors demonstrated;decreased awareness, need for assist;decreased awareness, need for safety;decreased insight to deficits;moderate impairment   Memory impaired   Attention Distractible during evaluation   Cognitive Comment Pt scored a 15/30 on the [employer] cognitive screen, indicating moderate-severe cognitive impairment. Pt has a dx of dementia with unknown cognitive baseline. Pt also demonstrated impairments with memory, command following, insight, and personal safety. Pt was an inconsistent historian this date and provided multiple/changing answers to questions. Based on pt's current cognitive level of function, pt would benefit from 24/7 supervision/assist with ADL/IADL tasks   Visual Perception   Occulomotor Impaired visual pursuits, saccades, and convergence. Sporatic tracking of moving objects. Convergence not present. Decreased efficiency with saccades.    Bathing   Level of [place] minimum assist (75% patients effort)   Physical Assist/Nonphysical Assist 1 person assist   Upper Body Dressing   Level of [place]: Dress Upper Body contact guard   Physical Assist/Nonphysical Assist: Dress Upper Body 1 person assist   Lower Body Dressing   Level of [place]: Dress Lower Body contact guard   Physical Assist/Nonphysical Assist: Dress Lower Body 1 person assist   Toileting   Level of [place]: Toilet contact guard   Physical Assist/Nonphysical Assist: Toilet 1 person assist   Grooming   Level of [place]: Grooming contact guard  "  Physical Assist/Nonphysical Assist: Grooming 1 person assist   Eating/Self Feeding   Level of [place]: Eating independent   Instrumental Activities of Daily Living (IADL)   Previous Responsibilities meal prep;housekeeping;laundry;shopping;yardwork;finances;medication management;driving   IADL Comments Unknown accuracy of previous responsibilities as pt is a poor historian   Activities of Daily Living Analysis   Impairments Contributing to Impaired Activities of Daily Living cognition impaired;balance impaired;strength decreased   Clinical Impression   Criteria for Skilled Therapeutic Interventions Met yes, treatment indicated   OT Diagnosis decreased independence with ADL/IADL tasks   Influenced by the following impairments impaired balance, cognition, visual deficits   Assessment of Occupational Performance 3-5 Performance Deficits   Identified Performance Deficits bathing, dressing, toileting, home mgmt   Clinical Decision Making (Complexity) Moderate complexity   Therapy Frequency daily   Predicted Duration of Therapy Intervention (days/wks) 1-3 days   Anticipated Equipment Needs at Discharge shower chair   Anticipated Discharge Disposition Home with Assist;Transitional Care Facility;Home with Home Therapy   Risks and Benefits of Treatment have been explained. Yes   Patient, Family & other staff in agreement with plan of care Yes   Clinical Impression Comments Pt would benefit from skilled OT services to increase independence with daily tasks and to ensure safe d/c plan from inpatient stay   [facility] TM \"6 Clicks\"   2016, Trustees of [facility], under license to [employer].  All rights reserved.   6 Clicks Short Forms Daily Activity Inpatient Short Form   [facility]  \"6 Clicks\" Daily Activity Inpatient Short Form   1. Putting on and taking off regular lower body clothing? 3 - A Little   2. Bathing (including washing, rinsing, drying)? 3 - A Little   3. Toileting, " which includes using toilet, bedpan or urinal? 3 - A Little   4. Putting on and taking off regular upper body clothing? 3 - A Little   5. Taking care of personal grooming such as brushing teeth? 3 - A Little   6. Eating meals? 4 - None   Daily Activity Raw Score (Score out of 24.Lower scores equate to lower levels of function) 19   Total Evaluation Time   Total Evaluation Time (Minutes) 25     KAREN Black/L  [facility]  [phone]

## 2018-09-29 NOTE — ED NOTES
Labs drawn with IV insertion and sent.  Kian a blood culture at this time and sent to lab to hold in case it is needed.

## 2018-09-30 ENCOUNTER — APPOINTMENT (OUTPATIENT)
Dept: MRI IMAGING | Facility: CLINIC | Age: 73
End: 2018-09-30
Attending: FAMILY MEDICINE
Payer: MEDICARE

## 2018-09-30 ENCOUNTER — APPOINTMENT (OUTPATIENT)
Dept: PHYSICAL THERAPY | Facility: CLINIC | Age: 73
End: 2018-09-30
Payer: MEDICARE

## 2018-09-30 PROCEDURE — 99225 ZZC SUBSEQUENT OBSERVATION CARE,LEVEL II: CPT | Performed by: FAMILY MEDICINE

## 2018-09-30 PROCEDURE — 70544 MR ANGIOGRAPHY HEAD W/O DYE: CPT | Mod: XS

## 2018-09-30 PROCEDURE — 70549 MR ANGIOGRAPH NECK W/O&W/DYE: CPT

## 2018-09-30 PROCEDURE — A9270 NON-COVERED ITEM OR SERVICE: HCPCS | Mod: GY | Performed by: FAMILY MEDICINE

## 2018-09-30 PROCEDURE — 96375 TX/PRO/DX INJ NEW DRUG ADDON: CPT | Mod: 59

## 2018-09-30 PROCEDURE — 25000132 ZZH RX MED GY IP 250 OP 250 PS 637: Mod: GY | Performed by: FAMILY MEDICINE

## 2018-09-30 PROCEDURE — G0378 HOSPITAL OBSERVATION PER HR: HCPCS

## 2018-09-30 PROCEDURE — 70551 MRI BRAIN STEM W/O DYE: CPT

## 2018-09-30 PROCEDURE — 25000128 H RX IP 250 OP 636: Performed by: FAMILY MEDICINE

## 2018-09-30 PROCEDURE — A9585 GADOBUTROL INJECTION: HCPCS | Performed by: FAMILY MEDICINE

## 2018-09-30 PROCEDURE — 40000193 ZZH STATISTIC PT WARD VISIT: Performed by: PHYSICAL THERAPIST

## 2018-09-30 PROCEDURE — 96376 TX/PRO/DX INJ SAME DRUG ADON: CPT | Mod: 59

## 2018-09-30 PROCEDURE — 96361 HYDRATE IV INFUSION ADD-ON: CPT

## 2018-09-30 PROCEDURE — 97110 THERAPEUTIC EXERCISES: CPT | Mod: GP | Performed by: PHYSICAL THERAPIST

## 2018-09-30 PROCEDURE — 97116 GAIT TRAINING THERAPY: CPT | Mod: GP | Performed by: PHYSICAL THERAPIST

## 2018-09-30 PROCEDURE — 25500064 ZZH RX 255 OP 636: Performed by: FAMILY MEDICINE

## 2018-09-30 RX ORDER — GADOBUTROL 604.72 MG/ML
10 INJECTION INTRAVENOUS ONCE
Status: COMPLETED | OUTPATIENT
Start: 2018-09-30 | End: 2018-09-30

## 2018-09-30 RX ORDER — CALCIUM CARBONATE 500 MG/1
1000 TABLET, CHEWABLE ORAL 3 TIMES DAILY PRN
Status: DISCONTINUED | OUTPATIENT
Start: 2018-09-30 | End: 2018-10-01 | Stop reason: HOSPADM

## 2018-09-30 RX ADMIN — CALCIUM CARBONATE (ANTACID) CHEW TAB 500 MG 1000 MG: 500 CHEW TAB at 22:44

## 2018-09-30 RX ADMIN — ASPIRIN 81 MG: 81 TABLET, COATED ORAL at 11:10

## 2018-09-30 RX ADMIN — METHYLPREDNISOLONE SODIUM SUCCINATE 62.5 MG: 125 INJECTION, POWDER, FOR SOLUTION INTRAMUSCULAR; INTRAVENOUS at 22:37

## 2018-09-30 RX ADMIN — SODIUM CHLORIDE 1000 ML: 9 INJECTION, SOLUTION INTRAVENOUS at 02:11

## 2018-09-30 RX ADMIN — SODIUM CHLORIDE 1000 ML: 9 INJECTION, SOLUTION INTRAVENOUS at 20:35

## 2018-09-30 RX ADMIN — GADOBUTROL 10 ML: 604.72 INJECTION INTRAVENOUS at 10:11

## 2018-09-30 RX ADMIN — DONEPEZIL HYDROCHLORIDE 5 MG: 5 TABLET ORAL at 20:35

## 2018-09-30 RX ADMIN — METHYLPREDNISOLONE SODIUM SUCCINATE 62.5 MG: 125 INJECTION, POWDER, FOR SOLUTION INTRAMUSCULAR; INTRAVENOUS at 02:10

## 2018-09-30 RX ADMIN — PROCHLORPERAZINE EDISYLATE 5 MG: 5 INJECTION INTRAMUSCULAR; INTRAVENOUS at 11:12

## 2018-09-30 RX ADMIN — METHYLPREDNISOLONE SODIUM SUCCINATE 62.5 MG: 125 INJECTION, POWDER, FOR SOLUTION INTRAMUSCULAR; INTRAVENOUS at 16:22

## 2018-09-30 RX ADMIN — LOSARTAN POTASSIUM 100 MG: 50 TABLET, FILM COATED ORAL at 11:10

## 2018-09-30 RX ADMIN — METHYLPREDNISOLONE SODIUM SUCCINATE 62.5 MG: 125 INJECTION, POWDER, FOR SOLUTION INTRAMUSCULAR; INTRAVENOUS at 11:17

## 2018-09-30 RX ADMIN — SODIUM CHLORIDE 1000 ML: 9 INJECTION, SOLUTION INTRAVENOUS at 12:47

## 2018-09-30 NOTE — PLAN OF CARE
"Problem: Patient Care Overview  Goal: Plan of Care/Patient Progress Review  Outcome: No Change  VSS. Afebrile. Denies any pain/discomfort. Pt confused. Oriented to self only.  Pt called staff into room at 0000 and had light string attached to IV tubing. Pt stated that \"it attached itself to the tube and my arm was turning black. So I called that girl in here and she fixed it.  But then she broke it and she will come back in the morning to fix it.\" Pt tells writer that he is at home and his kids are \"cleaning it all night tonight.\"  Pt's IV pulled out.  When writer commented on IV being out pt stated \"I know! It was turning black and it just came out.\"  Pt also believes that it is 2007 when asking what year it is. Pt reorientation attempted but continues to be confused. Pt uses call light appropriately. Calm and cooperative. LS decreased but clear. Denies any nausea/vomiting. Bilateral legs continue to have some moderate weakness. No concerns at this time.      "

## 2018-09-30 NOTE — PROGRESS NOTES
Discharge Planner   Discharge Plans in progress: on hold at this time, will continue to work with patient once disposition is determined. Family willing to bring patient to their home if needed.   Barriers to discharge plan: medical stability, being able to safely return home.   Follow up plan: TBD       Entered by: Narda Lyons 09/30/2018 11:06 AM

## 2018-09-30 NOTE — PLAN OF CARE
"Problem: Patient Care Overview  Goal: Plan of Care/Patient Progress Review  Discharge Planner PT   Patient plan for discharge: TCU  Current status: Patient having severe LOB and it is not improved with right ear. Confusion, dementia  Barriers to return to prior living situation: falling and LOB, confusion  Recommendations for discharge: TCU, or needs 24--7 supervision  Rationale for recommendations: Patient is impulsive, LOB, and confusion, and feeling \"off\"       Entered by: Aileen Brown 09/30/2018 2:01 PM         "

## 2018-09-30 NOTE — CONSULTS
Care Transition Initial Assessment - RN    Reason For Consult: discharge planning   Met with: Patient and Family.  DATA   Principal Problem:    Weakness of both lower extremities  Active Problems:    Essential hypertension    Dementia without behavioral disturbance, unspecified dementia type    Spinal stenosis of lumbar region with neurogenic claudication    Viral upper respiratory illness    Hyperlipidemia LDL goal <130    Leg weakness, bilateral       Cognitive Status: awake, alert and oriented.  Primary Care Clinic Name: [facility]  Primary Care MD Name: Dr. Heike Atnonio  Contact information and PCP information verified: Yes  Lives With: alone  Living Arrangements: house (town home)     Description of Support System: Supportive   Who is your support system?: Children   Support Assessment: Adequate family and caregiver support   Insurance concerns: No Insurance issues identified  ASSESSMENT  Patient currently receives the following services: none        Identified issues/concerns regarding health management: Met with patient and children/family.  Prior to hospitalization patient was independent and living alone in his townhouse.  His family lives close and checks on him often.  Disposition not determined at this time, however, family has offered for patient to stay with them for recovery if needed.  Pt does not have life line currently, but would like information to think about.      PLAN  Financial costs for the patient include na .  Patient given options and choices for discharge Not at this time unclear of disposition needs .  Patient/family is agreeable to the plan?  Family is supportive and involved in care  Patient anticipates discharging to:  Patient would like to go back home is possible.     Patient anticipates needs for home equipment: Does not know  Plan/Disposition: unknown at this time   Appointments: bashir      Care  (CTS) will continue to follow as  needed.    Sloane Lyons BSN, RN, PHN  RN Care Coordinator  Care Transitions Department  [facility] [phone]  [facility] [phone]

## 2018-09-30 NOTE — PROGRESS NOTES
[facility] Progress Note          Assessment and Plan:   Assessment:   Principal Problem:    Weakness of both lower extremities; balance impairment; impulsivity    Assessment: Etiology as MRI of the lumbar back is unremarkable for worsening stenosis.  MR I of the head and MRA of the head and neck showed no stroke or vessel obstruction or narrowing.  Vestibular examination by physical therapy has not shown an inner ear issue.  Patient did have an upper respiratory illness that began 3-4 days ago with known underlying dementia and an eye surgery 3 years ago which has made patient have difficulty with depth perception.  Did discuss with patient and his family that at this time no reversible etiology can be identified and it may be a combination of all the above which has allowed this to happen.  At this time patient is unsafe to return home alone and patient and his family are wanting TCU placement for ongoing physical therapy and initiation of occupational therapy services.  They are aware of the out-of-pocket cost and would like to proceed    Plan: Social work will be consulted and will start looking for TCU placement for ongoing PT and OT services.  Patient is medically stable to discharge once appropriate placement is identified.  If patient does have clinical worsening, would consider inpatient admission for further evaluation.    Active Problems:    Essential hypertension    Assessment: blood pressures have been slightly elevated in the 140-150 range overnight    Plan: Discussed with patient and family that improved blood pressure control in the future may be appropriate but given his balance issues at this time, will continue home regimen of losartan 100 mg daily and monitor.  If blood pressure become elevated above 160s systolic, would consider addition of a second agent.  Ongoing monitoring at TCU      Viral upper respiratory illness    Assessment: noted for 2-3 days prior to coming to the ED with  patient having one fever of 101.3 early in this observation stay but work up negative and patient having no recurrence, no signs of respiratory worsening.     Plan: Discussed that illness may be contributing to patient's decline (may have been the tipping point that allowed for decompensation).  Continue supportive cares.        Dementia without behavioral disturbance, unspecified dementia type    Assessment: present at baseline with family very supportive, patient currently lives home alone with family checking on him daily (by phone or in person) with increasing confusion for the 3 days prior to hospitalization but work up negative as above    Plan: Will discharge to TCU and monitor cognitive status and improvement.  Did discuss options and goals for long term management as patient's memory continues to decline.  Family is interested in learning more about assistive living facilities and will work with the TCU staff to explore options.       Spinal stenosis of lumbar region with neurogenic claudication    Assessment: chronic and stable on MRI performed during this hospitalization     Plan: no acute intervention needed, ongoing neurosurgery as an outpatient       Hyperlipidemia LDL goal <130    Assessment: on lipitor at home    Plan: restart at time of discharge      # Pain Assessment:  Current Pain Score 9/29/2018   Patient currently in pain? sammy   Duane s pain level was assessed and he currently denies pain.        Disposition Plan   Discharge Planning and Disposition    1.  Clinically stable enough to begin D/C disposition and planning: Yes      2.  Patient Goals for Discharge:      A.  Clinical - The following need to be achieved for discharge:  stable functional status, stable neurologic status and stable vital signs    B.  Diagnostic testing and/or procedures completed and all applicable results available before discharge - cultures, endoscopy, imaging, cardiac testing:  Met    C.  Anticipated post  "discharge treatment plan formulated and the following needs have been identified: rehab (PT, OT, ST)    3. Potential Barriers to Discharge:  awaiting TCU placement    4. Recommended Disposition:  Transitional Care Unit          Entered: Crissy Laird 09/30/2018, 2:38 PM       VTE:  Observation status   Code Status:  Full code        Interval History:   About the same today.  Vitals signs stable with no recurrance of the one time fever noted over 24 hours ago and no other symptoms concerning for worsening infection - still has mild cough and viral URI symptoms.  Tolerating medications and treatment plan without significant side effects or problems.  Eating and voiding well.  No new concerns today.  Continues to have difficulty with worse than normal confusion and balance issues.          Significant Problems:   History reviewed. No pertinent past medical history.         Physical Exam:   Blood pressure (!) 169/96, pulse 80, temperature 96.8  F (36  C), temperature source Oral, resp. rate 18, height 1.727 m (5' 8\"), weight 91.7 kg (202 lb 2.6 oz), SpO2 95 %.  Constitutional:   awake, alert, cooperative, no apparent distress, and appears stated age     Lungs:   No increased work of breathing, good air exchange, clear to auscultation bilaterally, no crackles or wheezing     Cardiovascular:   Normal apical impulse, regular rate and rhythm     Abdomen:   Bowel sounds present, abdomen soft and non-tender     Musculoskeletal:   Patient has 4-5/5 strength of upper and lower extremity muscles, no change in sensation of the lower legs on exam  no lower extremity pitting edema present     Neurologic:   Awake, alert, oriented to person and knows he is hospitalized but cannot remember the name.  CN 3-12 intact, strength and range of motion examined in the bed and is normal overall without focal deficit.  Patient is slightly tearful at times when discussing his current status and inability to care for himself but is " focused on improving as much as he can going forward.              Data:   All laboratory and imaging data in the past 24 hours reviewed    Attestation:  I have reviewed today's vital signs, notes, medications, labs and imaging.     Electronically Signed:  Crissy Laird MD    Note: Chart documentation done in part with Dragon Voice Recognition software. Although reviewed after completion, some word and grammatical errors may remain.

## 2018-09-30 NOTE — PROGRESS NOTES
Temp: 96.1  F (35.6  C) Temp src: Oral BP: 141/76 Pulse: 98 Heart Rate: 79 Resp: 18 SpO2: 93 % O2 Device: None (Room air)   Pt denies pain.  Unsteady/ataxic gait, pt not always following cues when prompting.  Repetition utilized.  Assist of 2 staff, gait belt.  Bed alarm in place.  Family supportive at bedside this afternoon, planning to return in AM for update on plan of care.

## 2018-09-30 NOTE — PLAN OF CARE
Problem: Patient Care Overview  Goal: Plan of Care/Patient Progress Review  Pt has been up in chair most of the day. Afebrile. Resp even and unlabored. MRI complete. Pt is forgetful and does not follow directions well. PT is alert to self and family members but disoriented to situation and time. Patient is dizzy at times when ambulating. x2 assist when transferring and ambulating. Pt was tearful during ambulating in hallway today. Cont to see PT for ongoing strengthening. All needs are met will cont to monitor

## 2018-10-01 ENCOUNTER — APPOINTMENT (OUTPATIENT)
Dept: PHYSICAL THERAPY | Facility: CLINIC | Age: 73
End: 2018-10-01
Payer: MEDICARE

## 2018-10-01 ENCOUNTER — MYC MEDICAL ADVICE (OUTPATIENT)
Dept: FAMILY MEDICINE | Facility: OTHER | Age: 73
End: 2018-10-01

## 2018-10-01 VITALS
TEMPERATURE: 96.2 F | HEART RATE: 55 BPM | SYSTOLIC BLOOD PRESSURE: 142 MMHG | BODY MASS INDEX: 30.64 KG/M2 | WEIGHT: 202.16 LBS | DIASTOLIC BLOOD PRESSURE: 69 MMHG | OXYGEN SATURATION: 94 % | HEIGHT: 68 IN | RESPIRATION RATE: 20 BRPM

## 2018-10-01 PROCEDURE — 96361 HYDRATE IV INFUSION ADD-ON: CPT

## 2018-10-01 PROCEDURE — 25000128 H RX IP 250 OP 636: Performed by: FAMILY MEDICINE

## 2018-10-01 PROCEDURE — 96376 TX/PRO/DX INJ SAME DRUG ADON: CPT

## 2018-10-01 PROCEDURE — 25000132 ZZH RX MED GY IP 250 OP 250 PS 637: Mod: GY | Performed by: FAMILY MEDICINE

## 2018-10-01 PROCEDURE — 90662 IIV NO PRSV INCREASED AG IM: CPT | Performed by: FAMILY MEDICINE

## 2018-10-01 PROCEDURE — G0378 HOSPITAL OBSERVATION PER HR: HCPCS

## 2018-10-01 PROCEDURE — A9270 NON-COVERED ITEM OR SERVICE: HCPCS | Mod: GY | Performed by: FAMILY MEDICINE

## 2018-10-01 PROCEDURE — 99217 ZZC OBSERVATION CARE DISCHARGE: CPT | Performed by: FAMILY MEDICINE

## 2018-10-01 PROCEDURE — 40000193 ZZH STATISTIC PT WARD VISIT: Performed by: PHYSICAL THERAPIST

## 2018-10-01 PROCEDURE — 97116 GAIT TRAINING THERAPY: CPT | Mod: GP | Performed by: PHYSICAL THERAPIST

## 2018-10-01 PROCEDURE — G0008 ADMIN INFLUENZA VIRUS VAC: HCPCS

## 2018-10-01 RX ORDER — ATORVASTATIN CALCIUM 20 MG/1
TABLET, FILM COATED ORAL
Qty: 90 TABLET | Refills: 0 | DISCHARGE
Start: 2018-10-01 | End: 2018-10-11

## 2018-10-01 RX ORDER — DONEPEZIL HYDROCHLORIDE 5 MG/1
TABLET, FILM COATED ORAL
Qty: 90 TABLET | Refills: 1 | DISCHARGE
Start: 2018-10-01 | End: 2018-10-09

## 2018-10-01 RX ORDER — LOSARTAN POTASSIUM 100 MG/1
TABLET ORAL
Qty: 90 TABLET | Refills: 0 | DISCHARGE
Start: 2018-10-01 | End: 2019-05-10

## 2018-10-01 RX ORDER — PREDNISONE 20 MG/1
20 TABLET ORAL DAILY
Qty: 5 TABLET | Refills: 0 | DISCHARGE
Start: 2018-10-01 | End: 2018-10-06

## 2018-10-01 RX ADMIN — METHYLPREDNISOLONE SODIUM SUCCINATE 62.5 MG: 125 INJECTION, POWDER, FOR SOLUTION INTRAMUSCULAR; INTRAVENOUS at 09:12

## 2018-10-01 RX ADMIN — INFLUENZA A VIRUS A/MICHIGAN/45/2015 X-275 (H1N1) ANTIGEN (FORMALDEHYDE INACTIVATED), INFLUENZA A VIRUS A/SINGAPORE/INFIMH-16-0019/2016 IVR-186 (H3N2) ANTIGEN (FORMALDEHYDE INACTIVATED), AND INFLUENZA B VIRUS B/MARYLAND/15/2016 BX-69A (A B/COLORADO/6/2017-LIKE VIRUS) ANTIGEN (FORMALDEHYDE INACTIVATED) 0.5 ML: 60; 60; 60 INJECTION, SUSPENSION INTRAMUSCULAR at 09:39

## 2018-10-01 RX ADMIN — LOSARTAN POTASSIUM 100 MG: 50 TABLET, FILM COATED ORAL at 09:12

## 2018-10-01 RX ADMIN — METHYLPREDNISOLONE SODIUM SUCCINATE 62.5 MG: 125 INJECTION, POWDER, FOR SOLUTION INTRAMUSCULAR; INTRAVENOUS at 04:19

## 2018-10-01 RX ADMIN — SODIUM CHLORIDE 1000 ML: 9 INJECTION, SOLUTION INTRAVENOUS at 04:19

## 2018-10-01 RX ADMIN — ASPIRIN 81 MG: 81 TABLET, COATED ORAL at 09:12

## 2018-10-01 NOTE — PROGRESS NOTES
S-(situation): Patient discharged to [facility] via wheelchair with son.    B-(background):Confusion, weakness   A-(assessment): Pt confused, VSS, afebrile.        R-(recommendations): Discharge instructions reviewed with pt and son. Listed belongings gathered and returned to patient.          Discharge Nursing Criteria:     Care Plan and Patient education resolved: Yes    New Medications- pt has been educated about purpose and side effects: Yes    Vaccines  Influenza status verified at discharge:  Yes    MISC  Prescriptions if needed, hard copies sent with patient  Yes  Home and hospital aquired medications returned to patient: Yes  Medication Bin checked and emptied on discharge Yes  Patient reports post-discharge pain management plan is effective: Yes

## 2018-10-01 NOTE — PROGRESS NOTES
Your information has been submitted on October 01st, 2018 at 03:36:01 PM CDT. The confirmation number is [id number]

## 2018-10-01 NOTE — PLAN OF CARE
Problem: Patient Care Overview  Goal: Plan of Care/Patient Progress Review  Outcome: No Change  VSS. Afebrile. Denies any pain/discomfort. Denies any nausea/vomiting. Pt disoriented x3 and impulsive. Pt has difficulty following commands when attempting to use bathroom or urinal. No complaints at this time.

## 2018-10-01 NOTE — PROGRESS NOTES
Discharge Planner   Discharge Plans in progress: Yes, completed.  To TCU at [facility] today.   Barriers to discharge plan: None   Follow up plan: TCU        Entered by: DAISHA MANZANO 10/01/2018 12:53 PM

## 2018-10-01 NOTE — PLAN OF CARE
Problem: Patient Care Overview  Goal: Plan of Care/Patient Progress Review  Outcome: No Change  Disoriented x2. VSS on RA. Afebrile. Ambulated in the fofana x1; 2 assist with walker and gait belt. Does not follow simple commands very well. Impulsive. Tums given at 2242. Good appetite. Resting comfortably in bed at this time.

## 2018-10-01 NOTE — DISCHARGE SUMMARY
[facility]    Discharge Summary  Hospitalist    Date of Admission:  9/28/2018  Date of Discharge:  10/1/2018  Discharging Provider: Nickolas Boggs MD  Date of Service (when I saw the patient): 10/01/18    Discharge Diagnoses   Principal Problem:    Weakness of both lower extremities  Active Problems:    Essential hypertension    Dementia without behavioral disturbance, unspecified dementia type    Spinal stenosis of lumbar region with neurogenic claudication    Viral upper respiratory illness    Hyperlipidemia LDL goal <130        History of Present Illness   Copied from H&P:  Duane Tegg is a 73 year old male who presents with increasing weakness.  He lives at home alone with a history of mild dementia, getting by with family stopping by.  For the past 2 days he has been more weak with a  dry cough and fatigue, not eating well.  Family notes he is been more confused and that he is not been able to get up by himself.  Patient has a history of spinal stenosis, followed by neurosurgery.  MRI earlier this year showed severe spinal stenosis and this was followed up with spinal injections and PT therapy.  He seemed to improve somewhat.  He notes now that his pain in his lower back is worse, possibly exacerbated by the coughing.  He denies any falls although he so weak he feels like he might fall.  He said chills, denies fever.  He said decreased appetite with some nausea but no vomiting, no diarrhea and no urinary symptoms other than he is been potentially having more incontinence than baseline.      Hospital Course   Duane Tegg was admitted on 9/28/2018.  The following problems were addressed during his hospitalization:    Principal Problem:    Weakness of both lower extremities   Assessment: Etiology as MRI of the lumbar back is unremarkable for worsening stenosis.  MR I of the head and MRA of the head and neck showed no stroke or vessel obstruction or narrowing.  Vestibular  examination by physical therapy has not shown an inner ear issue.  Patient did have an upper respiratory illness that began 3-4 days ago with known underlying dementia and an eye surgery 3 years ago which has made patient have difficulty with depth perception.  Did discuss with patient and his family that at this time no reversible etiology can be identified and it may be a combination of all the above which has allowed this to happen.  At this time patient is unsafe to return home alone and patient and his family are wanting TCU placement for ongoing physical therapy and initiation of occupational therapy services.  Patient be discharged to TCU at [facility] in [place].  Active Problems:    Essential hypertension   Discussed with patient and family that improved blood pressure control in the future may be appropriate but given his balance issues at this time, will continue home regimen of losartan 100 mg daily and monitor.  If blood pressure become elevated above 160s systolic, would consider addition of a second agent.  Ongoing monitoring at TCU      Dementia without behavioral disturbance, unspecified dementia type   present at baseline with family very supportive, patient currently lives home alone with family checking on him daily (by phone or in person) with increasing confusion for the 3 days prior to hospitalization but work up negative as above    Plan: Will discharge to TCU and monitor cognitive status and improvement.  Did discuss options and goals for long term management as patient's memory continues to decline.  Family is interested in learning more about assistive living facilities and will work with the TCU staff to explore options.       Spinal stenosis of lumbar region with neurogenic claudication   chronic and stable on MRI performed during this hospitalization , had been treated initially with IV steroids and will transition oral steroids for 5 more days after discharge      Viral upper  respiratory illness   noted for 2-3 days prior to coming to the ED with patient having one fever of 101.3 early in this observation stay but work up negative and patient having no recurrence, no signs of respiratory worsening      Hyperlipidemia LDL goal <130   on lipitor at home.,  Will continue at discharge        Nickolas Boggs MD    Significant Results and Procedures    No change on MRI in lumbar spine    Pending Results   These results will be followed up by Dr. Antonio  Unresulted Labs Ordered in the Past 30 Days of this Admission     Date and Time Order Name Status Description    9/29/2018 1010 Blood culture Preliminary     9/29/2018 1010 Blood culture Preliminary           Code Status   Full Code       Primary Care Physician   Heike Antonio    Physical Exam   Temp: 96.2  F (35.7  C) Temp src: Oral BP: 142/69 Pulse: 55 Heart Rate: 55 Resp: 20 SpO2: 94 % O2 Device: None (Room air)    Vitals:    09/29/18 0315   Weight: 91.7 kg (202 lb 2.6 oz)     Vital Signs with Ranges  Temp:  [96  F (35.6  C)-96.6  F (35.9  C)] 96.2  F (35.7  C)  Pulse:  [55-75] 55  Heart Rate:  [55-75] 55  Resp:  [18-20] 20  BP: (142-162)/(69-81) 142/69  SpO2:  [94 %] 94 %  I/O last 3 completed shifts:  In: 2293 [P.O.:500; I.V.:1793]  Out: -     Constitutional: awake, alert, cooperative, no apparent distress, and appears stated age  Respiratory: No increased work of breathing, good air exchange, clear to auscultation bilaterally, no crackles or wheezing  Cardiovascular: Normal apical impulse, regular rate and rhythm, normal S1 and S2, no S3 or S4, and no murmur noted  Neuropsychiatric: Pleasant but is mildly confused    Discharge Disposition   Discharged to rehabilitation facility  Condition at discharge: Good    Consultations This Hospital Stay   SOCIAL WORK IP CONSULT  PHYSICAL THERAPY ADULT IP CONSULT  OCCUPATIONAL THERAPY ADULT IP CONSULT  PHYSICAL THERAPY ADULT IP CONSULT  OCCUPATIONAL THERAPY ADULT IP CONSULT    Time Spent on  this Encounter   I, Nickolas Boggs, personally saw the patient today and spent less than or equal to 30 minutes discharging this patient.    Discharge Orders     General info for SNF   Length of Stay Estimate: Short Term Care: Estimated # of Days <30  Condition at Discharge: Improving  Level of care:skilled   Rehabilitation Potential: Good  Admission H&P remains valid and up-to-date: Yes  Recent Chemotherapy: N/A  Use Nursing Home Standing Orders: Yes     Mantoux instructions   Give two-step Mantoux (PPD) Per Facility Policy Yes     Follow Up and recommended labs and tests   Follow up with Nursing home physician.  No follow up labs or test are needed.     Activity - Up with assistive device     Full Code     Physical Therapy Adult Consult   Evaluate and treat as clinically indicated.    Reason:  Leg weakness     Occupational Therapy Adult Consult   Evaluate and treat as clinically indicated.    Reason:  Dementia with poor memory and impulsiveness       Discharge Medications   Current Discharge Medication List      START taking these medications    Details   predniSONE (DELTASONE) 20 MG tablet Take 1 tablet (20 mg) by mouth daily for 5 days  Qty: 5 tablet, Refills: 0    Associated Diagnoses: Spinal stenosis at L4-L5 level         CONTINUE these medications which have CHANGED    Details   aspirin 81 MG tablet Take 1 tablet (81 mg) by mouth daily  Qty: 90 tablet, Refills: 3    Associated Diagnoses: Benign essential hypertension      atorvastatin (LIPITOR) 20 MG tablet TAKE 1 TABLET(20 MG) BY MOUTH DAILY  Qty: 90 tablet, Refills: 0    Associated Diagnoses: Hyperlipidemia LDL goal <100      donepezil (ARICEPT) 5 MG tablet TAKE 1 TABLET(5 MG) BY MOUTH AT BEDTIME  Qty: 90 tablet, Refills: 1    Associated Diagnoses: Dementia without behavioral disturbance, unspecified dementia type      losartan (COZAAR) 100 MG tablet TAKE 1 TABLET(100 MG) BY MOUTH DAILY  Qty: 90 tablet, Refills: 0    Associated Diagnoses: Benign  essential hypertension         CONTINUE these medications which have NOT CHANGED    Details   order for DME Equipment being ordered: blood pressure machine and cuff  Qty: 1 each, Refills: 0    Associated Diagnoses: Essential hypertension         STOP taking these medications       diazepam (VALIUM) 10 MG tablet Comments:   Reason for Stopping:             Allergies   No Known Allergies  Data   Most Recent 3 CBC's:  Recent Labs   Lab Test  09/29/18   1036  09/28/18   2321  12/26/16   1145   WBC  4.8  7.9  5.8   HGB  12.4*  12.9*  14.6   MCV  89  89  91   PLT  140*  146*  200      Most Recent 3 BMP's:  Recent Labs   Lab Test  09/29/18   1036  09/28/18   2321 03/10/17  12/26/16   1145   NA  141  134   --   142   POTASSIUM  4.1  3.9  4.6  4.2   CHLORIDE  108  101   --   107   CO2  24  25   --   23   BUN  16  20   --   17   CR  0.79  0.91  0.9  0.78   ANIONGAP  9  8   --   12   GERI  8.4*  8.2*   --   8.7   GLC  200*  113*  94  100*     Most Recent 2 LFT's:  Recent Labs   Lab Test  09/28/18   2321 03/10/17  12/26/16   1145   AST  40  24  14   ALT  52  29  29   ALKPHOS  107   --   74   BILITOTAL  0.9   --   0.8     Most Recent INR's and Anticoagulation Dosing History:  Anticoagulation Dose History     There is no flowsheet data to display.        Most Recent 3 Troponin's:No lab results found.  Most Recent Cholesterol Panel:  Recent Labs   Lab Test 03/10/17  01/08/16   0945   CHOL  131  172   LDL   --   105*   HDL  40  35*   TRIG  130  162*     Most Recent 6 Bacteria Isolates From Any Culture (See EPIC Reports for Culture Details):  Recent Labs   Lab Test  09/29/18   1035  09/29/18   1031   CULT  No growth after 2 days  No growth after 2 days     Most Recent TSH, T4 and A1c Labs:  Recent Labs   Lab Test  12/26/16   1145   TSH  1.14     Results for orders placed or performed during the hospital encounter of 09/28/18   XR Chest 2 Views    Narrative    CHEST 2 VIEWS  9/29/2018 12:39 AM     HISTORY: Cough.  Weakness.    COMPARISON: None.    FINDINGS: A linear opacity in the right lung base most likely  represents atelectasis or scarring. The lungs are otherwise clear.  Normal-sized cardiac silhouette. Atherosclerotic calcification in the  thoracic aorta.      Impression    IMPRESSION: No convincing evidence of active cardiopulmonary disease.    JUANA GARCIA MD   Lumbar spine CT w/o contrast    Narrative    CT OF THE LUMBAR SPINE WITHOUT CONTRAST  9/29/2018 12:30 AM     COMPARISON: Lumbar spine MRI 7/12/2018    HISTORY:  Acute bilateral leg weakness, question of spinal stenosis.      TECHNIQUE: Axial images of the lumbar spine were acquired without  intravenous contrast. Multiplanar reformations were created from the  axial source images.      FINDINGS:  Grade 1 degenerative anterolisthesis of L4 upon L5 again  noted without change. Alignment of the lumbar vertebrae is otherwise  normal. Vertebral body heights of the lumbar spine remain normal.  There is no evidence for fracture of the lumbar spine. There is  moderate-severe degenerative facet arthropathy bilaterally at the  L3-L4, L4-L5 and L5-S1 levels. Posterior disc bulges at the L3-L4,  L4-L5 and L5-S1 levels are again noted without change. Moderate-severe  spinal canal narrowing at the L4-L5 level persists without change.  There is no other significant degenerative spinal canal narrowing of  the lumbar spine.      Impression    IMPRESSION: Degenerative changes of the lumbar spine as described  above without significant change from the comparison MRI.    Radiation dose for this scan was reduced using automated exposure  control, adjustment of the mA and/or kV according to patient size, or  iterative reconstruction technique.    KACIE ERWIN MD   MR Lumbar Spine w/o Contrast    Narrative    MRI OF THE LUMBAR SPINE WITHOUT CONTRAST  9/29/2018 8:50 AM     COMPARISON: Lumbar spine MRI 7/12/2018    HISTORY:  Increasing weakness in legs.     TECHNIQUE: Multiplanar,  multisequence MRI images of the lumbar spine  were acquired without IV contrast.    FINDINGS: There are five lumbar-type vertebrae for the purposes of  this dictation.     There is continued normal alignment of the lumbar vertebrae. Vertebral  body heights of the lumbar spine remain normal.  Marrow signal  throughout the lumbar vertebrae remains normal. There is no evidence  for fracture or pathologic bony lesion of the lumbar spine.    There is loss of disc height, disc desiccation and posterior disc  bulging at the L3-L4, L4-L5 and L5-S1 levels. The lumbar  intervertebral discs are otherwise within normal limits in height,  contour and signal intensity. There is a rudimentary disc at the S1-S2  level that remains normal in appearance.    The tip of the conus medullaris is at the lower L1 level which is  within normal limits. There is no evidence for intrathecal  abnormality.     Level by level:     L1-L2: There is no spinal canal or neural foraminal stenosis at this  level. No change from the comparison study.    L2-L3: There is mild facet arthropathy bilaterally. There is no spinal  canal or neural foraminal stenosis at this level. No change from the  comparison study.    L3-L4: There is a minimal circumferential disc bulge and mild facet  arthropathy bilaterally. These findings result in minimal right  foraminal narrowing but no spinal canal or left foraminal stenosis. No  change from the comparison study.    L4-L5: There is a circumferential disc bulge, thickening of the  ligamentum flavum bilaterally and severe facet arthropathy  bilaterally. These findings result in moderate-severe spinal canal  stenosis, moderate left foraminal stenosis and mild right foraminal  narrowing. No change from the comparison study.    L5-S1: There is a circumferential disc bulge and severe facet  arthropathy bilaterally. These findings result in severe left  foraminal stenosis, moderate right foraminal stenosis and mild  spinal  canal narrowing. No change from the comparison study.      Impression    IMPRESSION: Degenerative changes of the lumbar spine as described  above without significant change from the comparison study.    KACIE ERWIN MD   CT Head w/o contrast*    Narrative    CT OF THE HEAD WITHOUT CONTRAST 9/29/2018 3:55 PM     COMPARISON: None.    HISTORY: Increased confusion and balance issues, left leg weakness all  starting approximately 3 days ago.    TECHNIQUE: 5 mm thick axial CT images of the head were acquired  without IV contrast material.    FINDINGS: There is moderate diffuse cerebral volume loss. There are  subtle patchy areas of decreased density in the cerebral white matter  bilaterally that are consistent with sequela of chronic small vessel  ischemic disease.    The ventricles and basal cisterns are within normal limits in  configuration given the degree of cerebral volume loss.  There is no  midline shift. There are no extra-axial fluid collections.    No intracranial hemorrhage, mass or recent infarct.    The visualized paranasal sinuses are well-aerated. There is no  mastoiditis. There are no fractures of the visualized bones.      Impression    IMPRESSION: Diffuse cerebral volume loss and cerebral white matter  changes consistent with chronic small vessel ischemic disease. No  evidence for acute intracranial pathology.    Radiation dose for this scan was reduced using automated exposure  control, adjustment of the mA and/or kV according to patient size, or  iterative reconstruction technique.     KACIE ERWIN MD   MR Brain w/o Contrast    Narrative    MRI OF THE BRAIN WITHOUT AND WITH CONTRAST 9/30/2018 10:12 AM     COMPARISON: Head CT 9/29/2018.    HISTORY:  Increasing confusion, balance impairment, left leg weakness  starting 3 days ago.      TECHNIQUE: Axial diffusion-weighted with ADC map, axial T2-weighted  with fat saturation, axial T1-weighted, axial turboFLAIR and coronal  T1-weighted images  of the brain were acquired without intravenous  contrast.  Following intravenous administration of gadolinium (10 mL  Gadavist), axial T1-weighted images of the brain were acquired.     FINDINGS: There is moderate diffuse cerebral volume loss. There are  multiple tiny scattered focal areas of abnormal T2 signal  hyperintensity in the cerebral white matter bilaterally that are  consistent with sequela of chronic small vessel ischemic disease.    The ventricles and basal cisterns are within normal limits in  configuration given the degree of cerebral volume loss.  There is no  midline shift.  There are no extra-axial fluid collections.  There is  no evidence for stroke or acute intracranial hemorrhage.  There is no  abnormal contrast enhancement in the brain or its coverings.    There is no sinusitis or mastoiditis.      Impression    IMPRESSION:  Diffuse cerebral volume loss and cerebral white matter  changes consistent with chronic small vessel ischemic disease. No  evidence for acute intracranial pathology.    KACIE REWIN MD   MRA Brain ([place]) wo Contrast    Narrative    MR ANGIOGRAM OF THE HEAD WITHOUT CONTRAST   9/30/2018 10:12 AM     COMPARISON: None.    HISTORY: Increasing confusion, balance impairment, left leg weakness  starting 3 days ago.     TECHNIQUE:  3D time-of-flight MR angiogram of the head without  contrast. MIP reconstruction of all MR angiographic data was  performed.    FINDINGS:  The P1 segment of the left posterior cerebral artery is not  visualized and is likely hypoplastic or congenitally absent. The left  posterior cerebral artery is supplied by the patent left posterior  communicating artery. The bilateral distal internal carotid, basilar,  bilateral anterior cerebral, bilateral middle cerebral and bilateral  posterior cerebral arteries are patent and unremarkable with no  evidence for cerebral artery stenosis or aneurysm. The anterior  communicating artery is patent and  unremarkable. The posterior  communicating artery on the left is patent and unremarkable.       Impression    IMPRESSION:  Normal MR angiogram of the head.      KACIE ERWIN MD   MRA Neck (Carotids) wo & w Contrast    Narrative    MRA NECK WITHOUT AND WITH CONTRAST  9/30/2018 10:13 AM     COMPARISON: None.    HISTORY:   Increasing confusion, balance impairment, left leg weakness  starting 3 days ago.     TECHNIQUE: 2D time-of-flight MR angiogram of the neck without contrast  and 3D MR angiogram of the neck with 10 mL Gadavist gadolinium IV  contrast.  MIP reconstruction of all MR angiographic data was  performed. Estimates of carotid stenoses are made relative to the  distal internal carotid artery diameters except as noted.    FINDINGS:    Carotids: The common carotid arteries bilaterally are widely patent.  There is mild atherosclerotic narrowing (less than 50% luminal  diameter stenosis) at the origin of the right internal carotid artery.  The internal carotid arteries bilaterally are otherwise patent and  unremarkable.    Vertebrals: The origins of the vertebral arteries bilaterally are  obscured by motion artifact. The visualized portions of the vertebral  arteries bilaterally are patent without stenosis.    Aortic arch: The arteries as they arise from the aortic arch are  normally arranged with no evidence for stenosis.      Impression    IMPRESSION:    Mild atherosclerotic narrowing of the origin of the  right internal carotid artery. Nonvisualization of the origins of the  vertebral arteries bilaterally. Otherwise, normal neck MRA.    KACIE ERWIN MD

## 2018-10-01 NOTE — PLAN OF CARE
"Problem: Patient Care Overview  Goal: Plan of Care/Patient Progress Review  Discharge Planner PT   Patient plan for discharge: TCU  Current status: Patient impulsive and agitated. Refused to wait for set up prior to sit to stand transfer with 2WW, 1 LOB backwards while standing, no balance reaction and required assist of 2 to maintain patient upright. Ambulated 220 ft with 2WW, CGA for safety, verbal cues for walker management. 1 LOB when turning directions which required mod assist to correct. Patient demonstrates impaired safety awareness \"I am using the walker it will help me\", however demonstrates no balance reactions to recover balance. Min assist with hand placement for stand to sit transfer onto commode. Patient confused throughout mobilization.   Barriers to return to prior living situation: Risk of falls, lack of safety awareness, decreased safety with transitional movements  Recommendations for discharge: TCU  Rationale for recommendations: Patient would benefit from placement at TCU for continued skilled therapeutic intervention to train patient's balance and safety awareness in order to safely return him to his home environment.        Entered by: Nancy Marks 10/01/2018 11:57 AM         Physical Therapy Discharge Summary    Reason for therapy discharge:    Discharged to transitional care facility.    Progress towards therapy goal(s). See goals on Care Plan in [facility] electronic health record for goal details.  Goals partially met.  Barriers to achieving goals:   limited tolerance for therapy and discharge from facility.    Therapy recommendation(s):    Continued therapy is recommended.  Rationale/Recommendations:  see above.      "

## 2018-10-01 NOTE — PLAN OF CARE
Occupational Therapy Discharge Summary    Reason for therapy discharge:    Discharged to transitional care facility.    Progress towards therapy goal(s). See goals on Care Plan in [facility] electronic health record for goal details.  Goals partially met.  Barriers to achieving goals:   discharge from facility.    Therapy recommendation(s):    Continued therapy is recommended.  Rationale/Recommendations:  Per last OT note by Catrina PLATT Pt would benefit from further skilled OT services within the TCU setting to increase independence with ADLs and progress toward prior level of function, and for further cognitive testing/monitoring to assess pt's safety to return home as pt lives alone. Based on pt's current cognitive level of function, pt would benefit from 24/7 supervision/assist with all ADL/IADL tasks..     CARLOS Aguilar/L  [facility]  [phone]

## 2018-10-01 NOTE — PROGRESS NOTES
Writer has visited with patient regarding the recommendation for TCU placement for strengthening.  Reviewed Medicare guidelines for TCU coverage.  Provided patient with a Medicare certified list of area TCU facilities.  Patient in agreement with referrals to 1. [facility] (Phone: [phone] Fax: [phone])  2 [facility] (Admissions Phone: [phone] Main Phone: [phone] Fax: [phone])  3. [facility] and [facility] (Admissions phone: [phone] Main Phone: [phone] Fax: [phone]) (also the places family interested in).  Patient has two sons Macy and Aj. Patient requested that writer contact daughter in law, Fatimah, to review TCU choices.  Writer spoke with Fatimah and verified the above facilities.  Referrals have been sent.  Will await calls back from facilities.      1214- Patient has been accepted at [facility] and [facility] in [place].  Patient is aware of the upfront costs of both facilities.  Patient has chosen [facility] in [place] for TCU placement.  Patient's family is aware of patient's decision and son,Macy, will be here at 1330 to transport.

## 2018-10-01 NOTE — DISCHARGE INSTRUCTIONS
Neurology Appointment:  Monday Oct. 8th at 1:00pm (check in 15 min early and have photo ID and ins card with you)  Dr. Raj BARBER of Neurology/[place]  [address] [place] [address] [id number] [phone]

## 2018-10-01 NOTE — PROGRESS NOTES
Name: Duane Tegg    MRN#: [id number]    Reason for Hospitalization: Essential hypertension [I10]  Mild cognitive impairment [G31.84]  Spinal stenosis of lumbar region with neurogenic claudication [M48.062]  Viral upper respiratory illness [J06.9, B97.89]  Weakness of both lower extremities [R29.898]  Spinal stenosis at L4-L5 level [M48.061]    Discharge Date: 10/1/18    Patient / Family response to discharge plan: Patient in agreement with discharge to [facility]     Other Providers (Care Coordinator, County Services, PCA services etc): No    CTS Hand Off Completed: Not needed    PAS #     ARIELLE Score: None     Future Appointments: Future Appointments  Date Time Provider Department Center   10/1/2018 4:15 PM Tamika Yun OT [facility]       Discharge Disposition: transitional care unit    JUAN ALBERTO Cedillo

## 2018-10-01 NOTE — TELEPHONE ENCOUNTER
Likely needs an appointment to discuss hospital stay. Will route to RK to review/advise.  Yuly Crawford, CMA

## 2018-10-02 ENCOUNTER — TRANSFERRED RECORDS (OUTPATIENT)
Dept: HEALTH INFORMATION MANAGEMENT | Facility: CLINIC | Age: 73
End: 2018-10-02

## 2018-10-02 ENCOUNTER — NURSE TRIAGE (OUTPATIENT)
Dept: NURSING | Facility: CLINIC | Age: 73
End: 2018-10-02

## 2018-10-02 NOTE — TELEPHONE ENCOUNTER
Based on the discharge note he was supposed to take steroid concepción 5 days after discharge and no further . I do not think he needs steroids any further. Please advise office visit for post hospital follow up

## 2018-10-02 NOTE — TELEPHONE ENCOUNTER
Son calling; patient present.  States patient was discharged from hospital yesterday.  Is having difficulty breathing; FNA can hear patient in background struggling.  Tympanic temperature is 100.1  Advised to call 911 for transport to ED now.    Reason for Disposition    Severe difficulty breathing (e.g., struggling for each breath, speaks in single words)    Additional Information    Negative: [1] Breathing stopped AND [2] hasn't returned    Negative: Choking on something    Protocols used: BREATHING DIFFICULTY-ADULT-AH

## 2018-10-05 LAB
BACTERIA SPEC CULT: NORMAL
BACTERIA SPEC CULT: NORMAL
Lab: NORMAL
Lab: NORMAL
SPECIMEN SOURCE: NORMAL
SPECIMEN SOURCE: NORMAL

## 2018-10-08 ENCOUNTER — TELEPHONE (OUTPATIENT)
Dept: FAMILY MEDICINE | Facility: OTHER | Age: 73
End: 2018-10-08

## 2018-10-08 NOTE — TELEPHONE ENCOUNTER
"Aj not on C2C - spoke with son Macy per c. Informed of message below. He will call back to schedule.  Yuly Crawford CMA      Per [employer] - \"Hi Duane/family,   We tried to reach you by phone but were unsuccessful. Dr. Antonio reviewed this message and states that based on the discharge note, he was supposed to take steroid for 5 days after discharge and no further. She does not think he needs steroids any further. Please schedule an office visit for post hospital follow up. You can call the clinic at [phone] or use [employer] to schedule.     Please let us know if you have any further questions or concerns.     Thanks,   [facility] Team \"  "

## 2018-10-08 NOTE — TELEPHONE ENCOUNTER
Reason for Call:  Other     Detailed comments: Heide calling from [facility] states pt was discharged from hospital on prednisone taper and pt is supposed to be taking prednisone 20MG starting today , but rx was not sent from the hospital so wondering if Paco can send this rx to pharmacy [facility] [address]. Please advise and any questions call Heide at [phone]  Please call pt roberta Rocha once rx sent or with questions at phone [phone]    (duane is planning to make follow up hospital visit within the next week)    Phone Number Patient can be reached at:   [phone]    Best Time: ANY    Can we leave a detailed message on this number? YES    Call taken on 10/8/2018 at 10:20 AM by Mary Millan

## 2018-10-08 NOTE — TELEPHONE ENCOUNTER
Reason for Call: Request for an order or referral:    Order or referral being requested: verbal ok for home care orders.    Date needed: as soon as possible    Has the patient been seen by the PCP for this problem? YES    Additional comments: will need verbal order    Phone number Patient can be reached at:  Other phone number:  Chana from [facility] [phone]    Best Time:  any    Can we leave a detailed message on this number?  YES    Call taken on 10/8/2018 at 4:09 PM by Ladan Kim

## 2018-10-09 ENCOUNTER — OFFICE VISIT (OUTPATIENT)
Dept: FAMILY MEDICINE | Facility: OTHER | Age: 73
End: 2018-10-09
Payer: COMMERCIAL

## 2018-10-09 VITALS
WEIGHT: 195 LBS | DIASTOLIC BLOOD PRESSURE: 70 MMHG | OXYGEN SATURATION: 96 % | TEMPERATURE: 97.9 F | RESPIRATION RATE: 16 BRPM | HEIGHT: 68 IN | SYSTOLIC BLOOD PRESSURE: 134 MMHG | HEART RATE: 85 BPM | BODY MASS INDEX: 29.55 KG/M2

## 2018-10-09 DIAGNOSIS — I10 BENIGN ESSENTIAL HYPERTENSION: Primary | ICD-10-CM

## 2018-10-09 DIAGNOSIS — I10 ESSENTIAL HYPERTENSION: ICD-10-CM

## 2018-10-09 DIAGNOSIS — G31.84 MILD COGNITIVE IMPAIRMENT: ICD-10-CM

## 2018-10-09 DIAGNOSIS — M48.062 SPINAL STENOSIS OF LUMBAR REGION WITH NEUROGENIC CLAUDICATION: ICD-10-CM

## 2018-10-09 PROCEDURE — 99214 OFFICE O/P EST MOD 30 MIN: CPT | Performed by: FAMILY MEDICINE

## 2018-10-09 RX ORDER — DOXYCYCLINE HYCLATE 100 MG
1 TABLET ORAL DAILY
COMMUNITY
Start: 2018-10-04 | End: 2019-05-10

## 2018-10-09 RX ORDER — ALBUTEROL SULFATE 90 UG/1
2 AEROSOL, METERED RESPIRATORY (INHALATION) PRN
COMMUNITY
Start: 2018-10-04 | End: 2019-05-10

## 2018-10-09 RX ORDER — PREDNISONE 20 MG/1
20 TABLET ORAL DAILY
COMMUNITY
Start: 2018-10-07 | End: 2019-05-10

## 2018-10-09 RX ORDER — GUAIFENESIN 600 MG/1
600 TABLET, EXTENDED RELEASE ORAL 2 TIMES DAILY
COMMUNITY
Start: 2018-10-04 | End: 2019-05-10

## 2018-10-09 RX ORDER — AMLODIPINE BESYLATE 5 MG/1
5 TABLET ORAL DAILY
COMMUNITY
Start: 2018-10-04 | End: 2018-10-09

## 2018-10-09 RX ORDER — AMLODIPINE BESYLATE 5 MG/1
5 TABLET ORAL DAILY
Qty: 90 TABLET | Refills: 0 | Status: SHIPPED | OUTPATIENT
Start: 2018-10-09 | End: 2019-01-23

## 2018-10-09 ASSESSMENT — PAIN SCALES - GENERAL: PAINLEVEL: SEVERE PAIN (7)

## 2018-10-09 NOTE — PROGRESS NOTES
SUBJECTIVE:   Duane Tegg is a 73 year old male who presents to clinic today for the following health issues:      HPI      Hospital Follow-up Visit:    Hospital/Nursing Home/IP Rehab Facility: [facility]  Date of Admission: 10/2/18  Date of Discharge: 10/4/18  Reason(s) for Admission: Pneumonia/Bronchitis            Problems taking medications regularly:  None       Medication changes since discharge: None       Problems adhering to non-medication therapy:  None    Summary of hospitalization:  CareEverywhere information obtained and reviewed  Diagnostic Tests/Treatments reviewed.  Follow up needed:   Other Healthcare Providers Involved in Patient s Care:          Homecare  Update since discharge: improved.     Post Discharge Medication Reconciliation: discharge medications reconciled and changed, per note/orders (see AVS).  Plan of care communicated with patient     Coding guidelines for this visit:  Type of Medical   Decision Making Face-to-Face Visit       within 7 Days of discharge Face-to-Face Visit        within 14 days of discharge   Moderate Complexity [id number]   High Complexity [phone] [id number]            Problem list and histories reviewed & adjusted, as indicated.  Additional history: as documented        Patient Active Problem List   Diagnosis     Right bundle branch block     Essential hypertension     Mild cognitive impairment     Dementia without behavioral disturbance, unspecified dementia type     Lumbago     Weakness of both lower extremities     Spinal stenosis of lumbar region with neurogenic claudication     Viral upper respiratory illness     Hyperlipidemia LDL goal <130     Leg weakness, bilateral     Past Surgical History:   Procedure Laterality Date     COLONOSCOPY N/A 1/6/2016    Procedure: COMBINED COLONOSCOPY, SINGLE OR MULTIPLE BIOPSY/POLYPECTOMY BY BIOPSY;  Surgeon: Ryan Nicholas MD;  Location:  GI       Social History   Substance Use Topics     Smoking status: Former Smoker      "Smokeless tobacco: Never Used     Alcohol use Yes     Family History   Problem Relation Age of Onset     Diabetes Father          Current Outpatient Prescriptions   Medication Sig Dispense Refill     albuterol (PROAIR HFA/PROVENTIL HFA/VENTOLIN HFA) 108 (90 Base) MCG/ACT inhaler Inhale 2 puffs into the lungs as needed       amLODIPine (NORVASC) 5 MG tablet Take 1 tablet (5 mg) by mouth daily 90 tablet 0     aspirin 81 MG tablet Take 1 tablet (81 mg) by mouth daily 90 tablet 3     atorvastatin (LIPITOR) 20 MG tablet TAKE 1 TABLET(20 MG) BY MOUTH DAILY 90 tablet 0     doxycycline (VIBRA-TABS) 100 MG tablet Take 1 tablet by mouth daily       guaiFENesin (MUCINEX) 600 MG 12 hr tablet Take 600 mg by mouth 2 times daily       losartan (COZAAR) 100 MG tablet TAKE 1 TABLET(100 MG) BY MOUTH DAILY 90 tablet 0     order for DME Equipment being ordered: blood pressure machine and cuff 1 each 0     predniSONE (DELTASONE) 20 MG tablet Take 20 mg by mouth daily       No Known Allergies  BP Readings from Last 3 Encounters:   10/09/18 134/70   10/01/18 142/69   08/02/18 163/83    Wt Readings from Last 3 Encounters:   10/09/18 195 lb (88.5 kg)   09/29/18 202 lb 2.6 oz (91.7 kg)   07/12/18 197 lb (89.4 kg)                  Labs reviewed in EPIC    ROS:  Constitutional, HEENT, cardiovascular, pulmonary, gi and gu systems are negative, except as otherwise noted.    OBJECTIVE:     /70 (BP Location: Right arm, Patient Position: Chair, Cuff Size: Adult Regular)  Pulse 85  Temp 97.9  F (36.6  C) (Temporal)  Resp 16  Ht 5' 8\" (1.727 m)  Wt 195 lb (88.5 kg)  SpO2 96%  BMI 29.65 kg/m2  Body mass index is 29.65 kg/(m^2).   Physical Exam   Constitutional: He is oriented to person, place, and time. He appears well-developed and well-nourished.   HENT:   Head: Normocephalic and atraumatic.   Right Ear: External ear normal.   Left Ear: External ear normal.   Mouth/Throat: Oropharynx is clear and moist.   Eyes: EOM are normal.   Neck: " Neck supple.   Cardiovascular: Normal rate and regular rhythm.    Pulmonary/Chest: Effort normal and breath sounds normal. No respiratory distress. He has no wheezes. He has no rales. He exhibits no tenderness.   Abdominal: Soft. Bowel sounds are normal.   Musculoskeletal: Normal range of motion.   Neurological: He is alert and oriented to person, place, and time.   Psychiatric: He has a normal mood and affect.         Diagnostic Test Results:  none     ASSESSMENT/PLAN:   Patient is a pleasant 73-year-old with history of hypertension, hyperlipidemia, mild dementia who is here for a post hospital follow-up after his recent hospitalization for acute pneumonia needing respiratory suport    Is completed IV antibiotics while in the hospital and a course of doxycycline after he has been discharged.  His oxygen saturations are at 96% today.  Exam is completely at baseline.  He however feels more tired and weak which could be due to the severity of the infection and he might need a good 4-6 weeks to completely recuperate to his b baseline.  Problem List Items Addressed This Visit     Essential hypertension     Blood pressures during the hospital stay have been significantly elevated and the patient was started on amlodipine 5 mg to help with blood pressure control.  He continues to take losartan 100 mg daily.  Blood pressures today are at goal.  Continue the medications without any changes.         Mild cognitive impairment     According to the son the patient has had some side effects with the Aricept noting increased drowsiness.  He was stopped on this medication after his discharge and the patient was noted to be more awake when they did so.  We will continue to hold Aricept for now we will evaluate the need to restart it at the next visit.  He is also noted to have    Some symptoms of depression but does not want to be started on medications for this.  We will continue to monitor.  Denies any suicidal thoughts or  ideations.         Spinal stenosis of lumbar region with neurogenic claudication     Will refer to neurosurgery to consider another injection as it seems to have helped the last time that it.  Continue home exercises.         Relevant Medications    predniSONE (DELTASONE) 20 MG tablet      Other Visit Diagnoses     Benign essential hypertension    -  Primary    Relevant Medications    amLODIPine (NORVASC) 5 MG tablet          Heike Antonio MD  [facility]

## 2018-10-09 NOTE — MR AVS SNAPSHOT
After Visit Summary   10/9/2018    Duane Tegg    MRN: [id number]           Patient Information     Date Of Birth          1945        Visit Information        Provider Department      10/9/2018 3:00 PM Heike Antonio MD [facility]        Today's Diagnoses     Benign essential hypertension    -  1       Follow-ups after your visit        Follow-up notes from your care team     Return in about 3 months (around 1/9/2019).      Your next 10 appointments already scheduled     Jan 11, 2019  4:00 PM CST   Office Visit with Heike Antonoi MD   [facility] ([facility]    [address]  [facility] [address]   [phone]           Bring a current list of meds and any records pertaining to this visit. For Physicals, please bring immunization records and any forms needing to be filled out. Please arrive 10 minutes early to complete paperwork.              Who to contact     If you have questions or need follow up information about today's clinic visit or your schedule please contact [facility] directly at [phone].  Normal or non-critical lab and imaging results will be communicated to you by [email or web address], letter or phone within 4 business days after the clinic has received the results. If you do not hear from us within 7 days, please contact the clinic through [employer] or phone. If you have a critical or abnormal lab result, we will notify you by phone as soon as possible.  Submit refill requests through [employer] or call your pharmacy and they will forward the refill request to us. Please allow 3 business days for your refill to be completed.          Additional Information About Your Visit        [email or web address] Information     [employer] gives you secure access to your electronic health record. If you see a primary care provider, you can also send messages to your care team and make appointments. If you have questions, please call your  "primary care clinic.  If you do not have a primary care provider, please call [phone] and they will assist you.        Care EveryWhere ID     This is your Care EveryWhere ID. This could be used by other organizations to access your [facility] medical records  [id number]        Your Vitals Were     Pulse Temperature Respirations Height Pulse Oximetry BMI (Body Mass Index)    85 97.9  F (36.6  C) (Temporal) 16 5' 8\" (1.727 m) 96% 29.65 kg/m2       Blood Pressure from Last 3 Encounters:   10/09/18 134/70   10/01/18 142/69   08/02/18 163/83    Weight from Last 3 Encounters:   10/09/18 195 lb (88.5 kg)   09/29/18 202 lb 2.6 oz (91.7 kg)   07/12/18 197 lb (89.4 kg)              Today, you had the following     No orders found for display         Today's Medication Changes          These changes are accurate as of 10/9/18  3:52 PM.  If you have any questions, ask your nurse or doctor.               Stop taking these medicines if you haven't already. Please contact your care team if you have questions.     donepezil 5 MG tablet   Commonly known as:  ARIcept   Stopped by:  Heike Antonio MD                Where to get your medicines      These medications were sent to [facility] [address] [place] [address] [facility] [address] [facility] OF  & MAIN  [address] [facility], [facility] [address]     Phone:  [phone]     amLODIPine 5 MG tablet                Primary Care Provider Office Phone # Fax #    Heike Antonio [phone]       [address]  [facility] [address]        Equal Access to Services     [facility] Center[facility]ANISHA AH: Maykel Jiménez, wabonita lumaged, qamaribelta kaalmacyn mccarthy, jr moise. So [facility] [phone].    ATENCIÓN: Si habla español, tiene a jorge disposición servicios gratuitos de asistencia lingüística. Llame al [phone].    We comply with applicable federal civil rights laws and [place] laws. We do not discriminate on the " basis of race, color, national origin, age, disability, sex, sexual orientation, or gender identity.            Thank you!     Thank you for choosing [facility]  for your care. Our goal is always to provide you with excellent care. Hearing back from our patients is one way we can continue to improve our services. Please take a few minutes to complete the written survey that you may receive in the mail after your visit with us. Thank you!             Your Updated Medication List - Protect others around you: Learn how to safely use, store and throw away your medicines at www.disposemymeds.org.          This list is accurate as of 10/9/18  3:52 PM.  Always use your most recent med list.                   Brand Name Dispense Instructions for use Diagnosis    albuterol 108 (90 Base) MCG/ACT inhaler    PROAIR HFA/PROVENTIL HFA/VENTOLIN HFA     Inhale 2 puffs into the lungs as needed        amLODIPine 5 MG tablet    NORVASC    90 tablet    Take 1 tablet (5 mg) by mouth daily    Benign essential hypertension       aspirin 81 MG tablet     90 tablet    Take 1 tablet (81 mg) by mouth daily    Benign essential hypertension       atorvastatin 20 MG tablet    LIPITOR    90 tablet    TAKE 1 TABLET(20 MG) BY MOUTH DAILY    Hyperlipidemia LDL goal <100       doxycycline 100 MG tablet    VIBRA-TABS     Take 1 tablet by mouth daily        guaiFENesin 600 MG 12 hr tablet    MUCINEX     Take 600 mg by mouth 2 times daily        losartan 100 MG tablet    COZAAR    90 tablet    TAKE 1 TABLET(100 MG) BY MOUTH DAILY    Benign essential hypertension       order for DME     1 each    Equipment being ordered: blood pressure machine and cuff    Essential hypertension       predniSONE 20 MG tablet    DELTASONE     Take 20 mg by mouth daily

## 2018-10-10 ENCOUNTER — TELEPHONE (OUTPATIENT)
Dept: FAMILY MEDICINE | Facility: OTHER | Age: 73
End: 2018-10-10

## 2018-10-10 ENCOUNTER — MEDICAL CORRESPONDENCE (OUTPATIENT)
Dept: HEALTH INFORMATION MANAGEMENT | Facility: CLINIC | Age: 73
End: 2018-10-10

## 2018-10-10 NOTE — TELEPHONE ENCOUNTER
Reason for Call:  Form, our goal is to have forms completed with 72 hours, however, some forms may require a visit or additional information.    Type of letter, form or note:  medical    Who is the form from?: Home care    Where did the form come from: form was faxed in    What clinic location was the form placed at?: [facility] - [phone]    Where the form was placed: 's Box    What number is listed as a contact on the form?: [phone]       Additional comments: sign fax back    Call taken on 10/10/2018 at 10:54 AM by Ladan Kim

## 2018-10-11 ENCOUNTER — MYC REFILL (OUTPATIENT)
Facility: OTHER | Age: 73
End: 2018-10-11

## 2018-10-11 DIAGNOSIS — E78.5 HYPERLIPIDEMIA LDL GOAL <100: ICD-10-CM

## 2018-10-11 NOTE — ASSESSMENT & PLAN NOTE
Will refer to neurosurgery to consider another injection as it seems to have helped the last time that it.  Continue home exercises.

## 2018-10-11 NOTE — ASSESSMENT & PLAN NOTE
According to the son the patient has had some side effects with the Aricept noting increased drowsiness.  He was stopped on this medication after his discharge and the patient was noted to be more awake when they did so.  We will continue to hold Aricept for now we will evaluate the need to restart it at the next visit.  He is also noted to have    Some symptoms of depression but does not want to be started on medications for this.  We will continue to monitor.  Denies any suicidal thoughts or ideations.

## 2018-10-11 NOTE — ASSESSMENT & PLAN NOTE
Blood pressures during the hospital stay have been significantly elevated and the patient was started on amlodipine 5 mg to help with blood pressure control.  He continues to take losartan 100 mg daily.  Blood pressures today are at goal.  Continue the medications without any changes.

## 2018-10-12 ENCOUNTER — MEDICAL CORRESPONDENCE (OUTPATIENT)
Dept: HEALTH INFORMATION MANAGEMENT | Facility: CLINIC | Age: 73
End: 2018-10-12

## 2018-10-12 ENCOUNTER — TELEPHONE (OUTPATIENT)
Dept: FAMILY MEDICINE | Facility: OTHER | Age: 73
End: 2018-10-12

## 2018-10-12 RX ORDER — ATORVASTATIN CALCIUM 20 MG/1
TABLET, FILM COATED ORAL
Qty: 90 TABLET | Refills: 1 | Status: SHIPPED | OUTPATIENT
Start: 2018-10-12 | End: 2019-04-07

## 2018-10-12 NOTE — TELEPHONE ENCOUNTER
Reason for Call:  Form, our goal is to have forms completed with 72 hours, however, some forms may require a visit or additional information.    Type of letter, form or note:  [facility]     Who is the form from?: Home care    Where did the form come from: form was faxed in    What clinic location was the form placed at?: [facility] - [phone]    Where the form was placed: 's Box    What number is listed as a contact on the form?: [phone]       Additional comments: fax back to [phone]    Call taken on 10/12/2018 at 3:02 PM by Stacy Pineda

## 2018-10-12 NOTE — TELEPHONE ENCOUNTER
"Requested Prescriptions   Pending Prescriptions Disp Refills     atorvastatin (LIPITOR) 20 MG tablet 90 tablet 0     Sig: TAKE 1 TABLET(20 MG) BY MOUTH DAILY    Statins Protocol Failed    10/12/2018  7:54 AM       Failed - LDL on file in past 12 months    Recent Labs   Lab Test  01/08/16   0945   LDL  105*          Passed - No abnormal creatine kinase in past 12 months    No lab results found.        Passed - Recent (12 mo) or future (30 days) visit within the authorizing provider's specialty    Patient had office visit in the last 12 months or has a visit in the next 30 days with authorizing provider or within the authorizing provider's specialty.  See \"Patient Info\" tab in inbasket, or \"Choose Columns\" in Meds & Orders section of the refill encounter.           Passed - Patient is age 18 or older        atorvastatin (LIPITOR) 20 MG tablet  Routing refill request to provider for review/approval because:  Labs not current:  Fasting lipid panel    Belle Houston RN, BSN     "

## 2018-10-12 NOTE — TELEPHONE ENCOUNTER
Message from MyChart:  Original authorizing provider: Nickolas Boggs MD    Duane Tegg would like a refill of the following medications:  atorvastatin (LIPITOR) 20 MG tablet [Nickolas Boggs MD]    Preferred pharmacy: [facility] [address] [place] [address] [facility] OF  & MAIN    Comment:

## 2018-10-16 ENCOUNTER — TELEPHONE (OUTPATIENT)
Dept: FAMILY MEDICINE | Facility: OTHER | Age: 73
End: 2018-10-16

## 2018-10-16 NOTE — TELEPHONE ENCOUNTER
Reason for Call:  Form, our goal is to have forms completed with 72 hours, however, some forms may require a visit or additional information.    Type of letter, form or note:  medical    Who is the form from?: Home care - [facility]    Where did the form come from: form was faxed in    What clinic location was the form placed at?: [facility] - [phone]    Where the form was placed:TEAM B BOX    What number is listed as a contact on the form?: [phone]       Additional comments: Please review and sign orders, fax back to: [phone]    Call taken on 10/16/2018 at 2:12 PM by Aislinn Pratt

## 2018-10-22 ENCOUNTER — MYC REFILL (OUTPATIENT)
Dept: FAMILY MEDICINE | Facility: OTHER | Age: 73
End: 2018-10-22

## 2018-10-22 DIAGNOSIS — I10 BENIGN ESSENTIAL HYPERTENSION: ICD-10-CM

## 2018-10-22 RX ORDER — AMLODIPINE BESYLATE 5 MG/1
5 TABLET ORAL DAILY
Qty: 90 TABLET | Refills: 0 | Status: CANCELLED | OUTPATIENT
Start: 2018-10-22

## 2018-10-22 NOTE — TELEPHONE ENCOUNTER
Message from MyChart:  Original authorizing provider: Heike Antonio MD    Duane Tegg would like a refill of the following medications:  amLODIPine (NORVASC) 5 MG tablet [Heike Antonio MD]    Preferred pharmacy: [facility] [address] [place] [address] [address] [facility] OF  & MAIN    Comment:

## 2018-10-23 NOTE — TELEPHONE ENCOUNTER
"Requested Prescriptions   Pending Prescriptions Disp Refills     amLODIPine (NORVASC) 5 MG tablet 90 tablet 0     Sig: Take 1 tablet (5 mg) by mouth daily    Calcium Channel Blockers Protocol  Passed    10/22/2018  6:11 PM       Passed - Blood pressure under 140/90 in past 12 months    BP Readings from Last 3 Encounters:   10/09/18 134/70   10/01/18 142/69   08/02/18 163/83          Passed - Recent (12 mo) or future (30 days) visit within the authorizing provider's specialty    Patient had office visit in the last 12 months or has a visit in the next 30 days with authorizing provider or within the authorizing provider's specialty.  See \"Patient Info\" tab in inbasket, or \"Choose Columns\" in Meds & Orders section of the refill encounter.           Passed - Patient is age 18 or older       Passed - Normal serum creatinine on file in past 12 months    Recent Labs   Lab Test  09/29/18   1036   CR  0.79           amLODIPine (NORVASC) 5 MG tablet  Rx was sent 10/09/2018 for 90 tabs and 0 refills.   Pharmacy notified via E-prescribe refusal.  Belle Houston, RN, BSN       "

## 2018-10-31 ENCOUNTER — MEDICAL CORRESPONDENCE (OUTPATIENT)
Dept: HEALTH INFORMATION MANAGEMENT | Facility: CLINIC | Age: 73
End: 2018-10-31

## 2018-10-31 ENCOUNTER — TELEPHONE (OUTPATIENT)
Dept: FAMILY MEDICINE | Facility: OTHER | Age: 73
End: 2018-10-31

## 2018-10-31 NOTE — TELEPHONE ENCOUNTER
Reason for Call:  Form, our goal is to have forms completed with 72 hours, however, some forms may require a visit or additional information.    Type of letter, form or note:  medical    Who is the form from?: Home care    Where did the form come from: form was faxed in    What clinic location was the form placed at?: [facility] - [phone]    Where the form was placed: Dr's Box    What number is listed as a contact on the form?: [phone]       Additional comments: please complete form,sign,date and return to fax [phone]    Call taken on 10/31/2018 at 8:26 AM by Mary Millan

## 2018-11-01 ENCOUNTER — MEDICAL CORRESPONDENCE (OUTPATIENT)
Dept: HEALTH INFORMATION MANAGEMENT | Facility: CLINIC | Age: 73
End: 2018-11-01

## 2018-11-01 ENCOUNTER — TELEPHONE (OUTPATIENT)
Dept: FAMILY MEDICINE | Facility: OTHER | Age: 73
End: 2018-11-01

## 2018-11-01 NOTE — TELEPHONE ENCOUNTER
Our goal is to have forms completed with 72 hours, however some forms may require a visit or additional information.    Who is the form from?: Jennifer (if other please explain)  Where the form came from: form was faxed in  What clinic location was the form placed at?: [place]  Where the form was placed: 's Box  What number is listed as a contact on the form?: [phone]    Phone call message- patient request for a letter, form or note:    Date needed: as soon as possible  Please fax to [phone]  Has the patient signed a consent form for release of information? NO    Additional comments:     Call taken on 11/1/2018 at 1:42 PM by Evelia Lynn    Type of letter, form or note: medical

## 2018-11-05 ENCOUNTER — TELEPHONE (OUTPATIENT)
Dept: FAMILY MEDICINE | Facility: OTHER | Age: 73
End: 2018-11-05

## 2018-11-05 NOTE — TELEPHONE ENCOUNTER
Reason for Call:  Form, our goal is to have forms completed with 72 hours, however, some forms may require a visit or additional information.    Type of letter, form or note:  medical    Who is the form from?: Home care    Where did the form come from: form was faxed in    What clinic location was the form placed at?: [facility] - [phone]    Where the form was placed: [facility]    What number is listed as a contact on the form?: [phone]       Additional comments: none    Call taken on 11/5/2018 at 10:51 AM by Evelia Hurley

## 2018-11-05 NOTE — TELEPHONE ENCOUNTER
Form placed in RK's box for review/signature.  Sonja Greene CMA ([facility])

## 2018-11-06 ENCOUNTER — MEDICAL CORRESPONDENCE (OUTPATIENT)
Dept: HEALTH INFORMATION MANAGEMENT | Facility: CLINIC | Age: 73
End: 2018-11-06

## 2018-11-10 ENCOUNTER — MEDICAL CORRESPONDENCE (OUTPATIENT)
Dept: HEALTH INFORMATION MANAGEMENT | Facility: CLINIC | Age: 73
End: 2018-11-10

## 2018-11-12 ENCOUNTER — TELEPHONE (OUTPATIENT)
Dept: FAMILY MEDICINE | Facility: OTHER | Age: 73
End: 2018-11-12

## 2018-11-12 NOTE — TELEPHONE ENCOUNTER
Our goal is to have forms completed with 72 hours, however some forms may require a visit or additional information.    Who is the form from?: Home care  Where the form came from: form was faxed in  What clinic location was the form placed at?: [place]  Where the form was placed: 's Box  What number is listed as a contact on the form?: [phone]    Phone call message- patient request for a letter, form or note:    Date needed: as soon as possible  Please fax to [phone]  Has the patient signed a consent form for release of information? NO    Additional comments:     Call taken on 11/12/2018 at 8:49 AM by Evelia Lynn    Type of letter, form or note: medical

## 2018-11-16 ENCOUNTER — TELEPHONE (OUTPATIENT)
Dept: FAMILY MEDICINE | Facility: OTHER | Age: 73
End: 2018-11-16

## 2018-11-16 ENCOUNTER — MEDICAL CORRESPONDENCE (OUTPATIENT)
Dept: HEALTH INFORMATION MANAGEMENT | Facility: CLINIC | Age: 73
End: 2018-11-16

## 2018-11-16 NOTE — TELEPHONE ENCOUNTER
Reason for Call:  Form, our goal is to have forms completed with 72 hours, however, some forms may require a visit or additional information.    Type of letter, form or note:  medical    Who is the form from?: Home care    Where did the form come from: form was faxed in    What clinic location was the form placed at?: [facility] - [phone]    Where the form was placed: 's Box    What number is listed as a contact on the form?: [phone]       Additional comments: sign fax back    Call taken on 11/16/2018 at 10:51 AM by Ladan Kim

## 2018-11-16 NOTE — TELEPHONE ENCOUNTER
Form placed in RK's box for review/signature.  Sonja Greene CMA ([facility])

## 2018-11-20 ENCOUNTER — MEDICAL CORRESPONDENCE (OUTPATIENT)
Dept: HEALTH INFORMATION MANAGEMENT | Facility: CLINIC | Age: 73
End: 2018-11-20

## 2018-11-21 ENCOUNTER — TELEPHONE (OUTPATIENT)
Dept: FAMILY MEDICINE | Facility: OTHER | Age: 73
End: 2018-11-21

## 2018-11-21 NOTE — TELEPHONE ENCOUNTER
Reason for Call:  Form, our goal is to have forms completed with 72 hours, however, some forms may require a visit or additional information.    Type of letter, form or note:  orders    Who is the form from?: [facility] (if other please explain)    Where did the form come from: form was faxed in    What clinic location was the form placed at?: [facility] - [phone]    Where the form was placed: 's Box    What number is listed as a contact on the form?: [phone]       Additional comments:  Please sign and fax back to [phone].  Thank you    Call taken on 11/21/2018 at 1:13 PM by Sowmya Chavez

## 2018-12-06 ENCOUNTER — TELEPHONE (OUTPATIENT)
Dept: FAMILY MEDICINE | Facility: OTHER | Age: 73
End: 2018-12-06

## 2018-12-06 NOTE — TELEPHONE ENCOUNTER
Reason for Call:  Form, our goal is to have forms completed with 72 hours, however, some forms may require a visit or additional information.    Type of letter, form or note:  medical    Who is the form from?: Home care    Where did the form come from: form was faxed in    What clinic location was the form placed at?: [facility] - [phone]    Where the form was placed: 's Box    What number is listed as a contact on the form?: [phone]       Additional comments: sign fax back    Call taken on 12/6/2018 at 3:04 PM by Ladan Kim

## 2018-12-23 ENCOUNTER — MYC REFILL (OUTPATIENT)
Dept: OTHER | Age: 73
End: 2018-12-23

## 2018-12-23 DIAGNOSIS — I10 BENIGN ESSENTIAL HYPERTENSION: ICD-10-CM

## 2018-12-23 RX ORDER — LOSARTAN POTASSIUM 100 MG/1
TABLET ORAL
Qty: 90 TABLET | Refills: 0 | Status: CANCELLED | OUTPATIENT
Start: 2018-12-23

## 2018-12-27 NOTE — TELEPHONE ENCOUNTER
Medication is being filled for 1 time refill only due to:  Patient needs to be seen because due for follow up per providers note on 10/9/18.    Last Written Prescription Date:  10/1/18  Last Fill Quantity: 90,  # refills: 0   Last office visit: Department has no specialty with prescribing provider:  10/9/18   Future Office Visit:   Next 5 appointments (look out 90 days)    Jan 11, 2019  4:00 PM CST  [facility] [address] [place] [id number]  [phone]

## 2019-01-23 ENCOUNTER — MYC REFILL (OUTPATIENT)
Dept: FAMILY MEDICINE | Facility: OTHER | Age: 74
End: 2019-01-23

## 2019-01-23 DIAGNOSIS — I10 BENIGN ESSENTIAL HYPERTENSION: ICD-10-CM

## 2019-01-23 NOTE — TELEPHONE ENCOUNTER
"Requested Prescriptions   Pending Prescriptions Disp Refills     amLODIPine (NORVASC) 5 MG tablet 90 tablet 0     Sig: Take 1 tablet (5 mg) by mouth daily    Calcium Channel Blockers Protocol  Passed - 1/23/2019  6:18 AM       Passed - Blood pressure under 140/90 in past 12 months    BP Readings from Last 3 Encounters:   10/09/18 134/70   10/01/18 142/69   08/02/18 163/83                Passed - Recent (12 mo) or future (30 days) visit within the authorizing provider's specialty    Patient had office visit in the last 12 months or has a visit in the next 30 days with authorizing provider or within the authorizing provider's specialty.  See \"Patient Info\" tab in inbasket, or \"Choose Columns\" in Meds & Orders section of the refill encounter.             Passed - Medication is active on med list       Passed - Patient is age 18 or older       Passed - Normal serum creatinine on file in past 12 months    Recent Labs   Lab Test 09/29/18  1036   CR 0.79             Routing refill request to provider for review/approval because:  Danica given x1 and patient did not follow up, please advise    Patient had appointment scheduled on 1/11/19 but no showed. No future appointment scheduled.     "

## 2019-01-24 RX ORDER — AMLODIPINE BESYLATE 5 MG/1
5 TABLET ORAL DAILY
Qty: 90 TABLET | Refills: 0 | Status: SHIPPED | OUTPATIENT
Start: 2019-01-24 | End: 2019-05-03

## 2019-04-07 ENCOUNTER — MYC REFILL (OUTPATIENT)
Dept: FAMILY MEDICINE | Facility: OTHER | Age: 74
End: 2019-04-07

## 2019-04-07 DIAGNOSIS — E78.5 HYPERLIPIDEMIA LDL GOAL <100: ICD-10-CM

## 2019-04-09 NOTE — TELEPHONE ENCOUNTER
Lipitor  Routing refill request to provider for review/approval because:  Labs not current:  FLP    Isabel Coleman, RN, BSN

## 2019-04-10 RX ORDER — ATORVASTATIN CALCIUM 20 MG/1
TABLET, FILM COATED ORAL
Qty: 90 TABLET | Refills: 1 | Status: SHIPPED | OUTPATIENT
Start: 2019-04-10 | End: 2019-11-29

## 2019-04-14 DIAGNOSIS — E78.5 HYPERLIPIDEMIA LDL GOAL <100: ICD-10-CM

## 2019-04-16 RX ORDER — ATORVASTATIN CALCIUM 20 MG/1
TABLET, FILM COATED ORAL
Qty: 90 TABLET | Refills: 0 | OUTPATIENT
Start: 2019-04-16

## 2019-04-16 NOTE — TELEPHONE ENCOUNTER
"Requested Prescriptions   Pending Prescriptions Disp Refills     atorvastatin (LIPITOR) 20 MG tablet [Pharmacy Med Name: ATORVASTATIN 20MG TABLETS] 90 tablet 0     Sig: TAKE 1 TABLET BY MOUTH EVERY DAY       Statins Protocol Failed - 4/14/2019  4:51 PM        Failed - LDL on file in past 12 months     Recent Labs   Lab Test 01/08/16  0945   *             Passed - No abnormal creatine kinase in past 12 months     No lab results found.             Passed - Recent (12 mo) or future (30 days) visit within the authorizing provider's specialty     Patient had office visit in the last 12 months or has a visit in the next 30 days with authorizing provider or within the authorizing provider's specialty.  See \"Patient Info\" tab in inbasket, or \"Choose Columns\" in Meds & Orders section of the refill encounter.              Passed - Medication is active on med list        Passed - Patient is age 18 or older          Last ov 10/09/2018  Last filled 04/10/2019 for 6 months    Should have refills  "

## 2019-05-03 DIAGNOSIS — I10 BENIGN ESSENTIAL HYPERTENSION: ICD-10-CM

## 2019-05-03 NOTE — TELEPHONE ENCOUNTER
Norvasc  Routing refill request to provider for review/approval because:  Patient needs to be seen because:  Overdue for HTN follow up    Isabel Coleman RN, BSN

## 2019-05-08 ENCOUNTER — NURSE TRIAGE (OUTPATIENT)
Dept: NURSING | Facility: CLINIC | Age: 74
End: 2019-05-08

## 2019-05-08 NOTE — PROGRESS NOTES
SUBJECTIVE:   Duane Tegg is a 73 year old male who presents to clinic today for the following health issues:      HPI  Hypertension Follow-up      Outpatient blood pressures are not being checked.    Low Salt Diet: no added salt    Not checking BP at home.     Taking Losartan and Amlodipine without side effects.     [place] in [place] - unsure if he will this year, goes after Radha time.     Son present in clinic today.     He is seeing back specialist because of pain in back and into the left leg.  They are going to see what they recommend for treatment.     Denies chest pain, shortness of breath, peripheral edema, claudication, headaches, vision changes, paresthesias (other than noted in the left leg).     Additional history: as documented    Reviewed and updated as needed this visit by clinical staff         Reviewed and updated as needed this visit by Provider         Current Outpatient Medications   Medication Sig Dispense Refill     amLODIPine (NORVASC) 5 MG tablet TAKE 1 TABLET(5 MG) BY MOUTH DAILY 90 tablet 1     aspirin 81 MG tablet Take 1 tablet (81 mg) by mouth daily 90 tablet 3     atorvastatin (LIPITOR) 20 MG tablet TAKE 1 TABLET(20 MG) BY MOUTH DAILY 90 tablet 1     losartan (COZAAR) 100 MG tablet TAKE 1 TABLET(100 MG) BY MOUTH DAILY 90 tablet 1     order for DME Equipment being ordered: blood pressure machine and cuff 1 each 0     No Known Allergies    ROS:  Constitutional, HEENT, cardiovascular, pulmonary, gi and gu systems are negative, except as otherwise noted.    OBJECTIVE:     /76   Pulse 74   Temp 97.3  F (36.3  C) (Temporal)   Resp 14   Wt 92.3 kg (203 lb 8 oz)   SpO2 94%   BMI 30.94 kg/m    Body mass index is 30.94 kg/m .  GENERAL: healthy, alert and no distress  EYES: Eyes grossly normal to inspection, PERRL and conjunctivae and sclerae normal  NECK: no adenopathy, no asymmetry, masses, or scars and thyroid normal to palpation  RESP: lungs clear to auscultation - no rales,  rhonchi or wheezes  CV: regular rate and rhythm, normal S1 S2, no S3 or S4, no murmur, click or rub, no peripheral edema and peripheral pulses strong  MS: no gross musculoskeletal defects noted, no edema    Diagnostic Test Results:  none     ASSESSMENT/PLAN:       ICD-10-CM    1. Essential hypertension I10    2. Benign essential hypertension I10 losartan (COZAAR) 100 MG tablet     amLODIPine (NORVASC) 5 MG tablet   3. Need for prophylactic vaccination with tetanus-diphtheria (Td) Z23 TD PRESERV FREE, IM (7+ YRS)       BP stable.  Refilled both medications for the next 6 months.   Updated immunizations today, recommended they figure out what shingles vaccine and when he was given it in [place], may need a booster.   Recommended follow-up in 6 months, sooner if needed, will need labs and Medicare Wellness at next visit.     Options for treatment and follow-up care were reviewed with the patient and/or guardian. Patient and/or guardian engaged in the decision making process and verbalized understanding of the options discussed and agreed with the final plan.     Lelia Blood PA-C  [facility]

## 2019-05-08 NOTE — TELEPHONE ENCOUNTER
Daughter in law Heide Slater calling requesting refill of Amlodipine. Per notes patient is overdue for hypertension follow up. Caller verbalized understanding. Denies further questions.    Bel Salvador RN  [facility]

## 2019-05-09 DIAGNOSIS — I10 BENIGN ESSENTIAL HYPERTENSION: ICD-10-CM

## 2019-05-09 RX ORDER — AMLODIPINE BESYLATE 5 MG/1
TABLET ORAL
Qty: 30 TABLET | Refills: 0 | Status: SHIPPED | OUTPATIENT
Start: 2019-05-09 | End: 2019-05-09

## 2019-05-09 RX ORDER — AMLODIPINE BESYLATE 5 MG/1
TABLET ORAL
Qty: 90 TABLET | Refills: 1 | Status: SHIPPED | OUTPATIENT
Start: 2019-05-09 | End: 2019-05-10

## 2019-05-09 NOTE — TELEPHONE ENCOUNTER
Due for OV, RN unable to send 90 days.    Will route to provider.    Next 5 appointments (look out 90 days)    May 10, 2019 11:40 AM CDT  Office Visit with Lelia Blood PA-C  [facility] [address] [place] [id number]  [phone]        Justin Aceves RN, BSN

## 2019-05-09 NOTE — TELEPHONE ENCOUNTER
Sounds like per triage note patient was put on ES schedule for 05/10 for med check for the refill.  Nicole Poe MA

## 2019-05-09 NOTE — TELEPHONE ENCOUNTER
Gave a short prescription . Please have him come to clinic for  BP recheck . Please schedule follow up appt for hypertension

## 2019-05-10 ENCOUNTER — OFFICE VISIT (OUTPATIENT)
Dept: FAMILY MEDICINE | Facility: OTHER | Age: 74
End: 2019-05-10
Payer: COMMERCIAL

## 2019-05-10 VITALS
TEMPERATURE: 97.3 F | SYSTOLIC BLOOD PRESSURE: 130 MMHG | WEIGHT: 203.5 LBS | HEART RATE: 74 BPM | BODY MASS INDEX: 30.94 KG/M2 | RESPIRATION RATE: 14 BRPM | OXYGEN SATURATION: 94 % | DIASTOLIC BLOOD PRESSURE: 76 MMHG

## 2019-05-10 DIAGNOSIS — I10 ESSENTIAL HYPERTENSION: Primary | ICD-10-CM

## 2019-05-10 DIAGNOSIS — Z23 NEED FOR PROPHYLACTIC VACCINATION WITH TETANUS-DIPHTHERIA (TD): ICD-10-CM

## 2019-05-10 DIAGNOSIS — I10 BENIGN ESSENTIAL HYPERTENSION: ICD-10-CM

## 2019-05-10 PROCEDURE — 99213 OFFICE O/P EST LOW 20 MIN: CPT | Mod: 25 | Performed by: PHYSICIAN ASSISTANT

## 2019-05-10 PROCEDURE — 90471 IMMUNIZATION ADMIN: CPT | Performed by: PHYSICIAN ASSISTANT

## 2019-05-10 PROCEDURE — 90714 TD VACC NO PRESV 7 YRS+ IM: CPT | Performed by: PHYSICIAN ASSISTANT

## 2019-05-10 RX ORDER — AMLODIPINE BESYLATE 5 MG/1
TABLET ORAL
Qty: 90 TABLET | Refills: 1 | Status: SHIPPED | OUTPATIENT
Start: 2019-05-10 | End: 2019-11-29

## 2019-05-10 RX ORDER — LOSARTAN POTASSIUM 100 MG/1
TABLET ORAL
Qty: 90 TABLET | Refills: 1 | Status: SHIPPED | OUTPATIENT
Start: 2019-05-10 | End: 2019-11-29

## 2019-05-10 NOTE — NURSING NOTE
Prior to injection, verified patient identity using patient's name and date of birth.  Due to injection administration, patient instructed to remain in clinic for 15 minutes  afterwards, and to report any adverse reaction to me immediately.  Screening Questionnaire for Adult Immunization    Are you sick today?   No   Do you have allergies to medications, food, a vaccine component or latex?   No   Have you ever had a serious reaction after receiving a vaccination?   No   Do you have a long-term health problem with heart disease, lung disease, asthma, kidney disease, metabolic disease (e.g. diabetes), anemia, or other blood disorder?   No   Do you have cancer, leukemia, HIV/AIDS, or any other immune system problem?   No   In the past 3 months, have you taken medications that affect  your immune system, such as prednisone, other steroids, or anticancer drugs; drugs for the treatment of rheumatoid arthritis, Crohn s disease, or psoriasis; or have you had radiation treatments?   No   Have you had a seizure, or a brain or other nervous system problem?   No   During the past year, have you received a transfusion of blood or blood     products, or been given immune (gamma) globulin or antiviral drug?   No   For women: Are you pregnant or is there a chance you could become        pregnant during the next month?   No   Have you received any vaccinations in the past 4 weeks?   No     Immunization questionnaire answers were all negative.        Per orders of Lelia Blood PA-C, injection of Td given by Nicole Poe. Patient instructed to remain in clinic for 15 minutes afterwards, and to report any adverse reaction to me immediately.       Screening performed by Nicole Poe on 5/10/2019 at 12:11 PM.

## 2019-05-10 NOTE — PATIENT INSTRUCTIONS
Meds refilled to pharmacy.   Follow-up in 6 months for medication check.   Check to see which Shingles vaccine you received and on what date so we can get it updated.

## 2019-11-04 NOTE — PATIENT INSTRUCTIONS
Anticoagulant or Antiplatelet Medication Use  ASPIRIN: Discontinue ASA 7-10 days prior to procedure to reduce bleeding risk.  It should be resumed post-operatively.  NSAIDS: Naproxen (Naprosyn):   Stop 5  days prior to surgery        Losartan   HOLD this medication for the 24 hours prior to surgery.      Before Your Surgery      Call your surgeon if there is any change in your health. This includes signs of a cold or flu (such as a sore throat, runny nose, cough, rash or fever).    Do not smoke, drink alcohol or take over the counter medicine (unless your surgeon or primary care doctor tells you to) for the 24 hours before and after surgery.    If you take prescribed drugs: Follow your doctor s orders about which medicines to take and which to stop until after surgery.    Eating and drinking prior to surgery: follow the instructions from your surgeon    Take a shower or bath the night before surgery. Use the soap your surgeon gave you to gently clean your skin. If you do not have soap from your surgeon, use your regular soap. Do not shave or scrub the surgery site.  Wear clean pajamas and have clean sheets on your bed.

## 2019-11-07 ENCOUNTER — OFFICE VISIT (OUTPATIENT)
Dept: FAMILY MEDICINE | Facility: OTHER | Age: 74
End: 2019-11-07
Payer: COMMERCIAL

## 2019-11-07 VITALS
RESPIRATION RATE: 16 BRPM | SYSTOLIC BLOOD PRESSURE: 124 MMHG | DIASTOLIC BLOOD PRESSURE: 78 MMHG | HEART RATE: 80 BPM | BODY MASS INDEX: 29.7 KG/M2 | OXYGEN SATURATION: 98 % | HEIGHT: 68 IN | TEMPERATURE: 97.8 F | WEIGHT: 196 LBS

## 2019-11-07 DIAGNOSIS — Z01.818 PREOP GENERAL PHYSICAL EXAM: Primary | ICD-10-CM

## 2019-11-07 DIAGNOSIS — M48.062 SPINAL STENOSIS OF LUMBAR REGION WITH NEUROGENIC CLAUDICATION: ICD-10-CM

## 2019-11-07 DIAGNOSIS — E78.5 HYPERLIPIDEMIA LDL GOAL <130: ICD-10-CM

## 2019-11-07 DIAGNOSIS — I10 BENIGN ESSENTIAL HYPERTENSION: ICD-10-CM

## 2019-11-07 LAB
ANION GAP SERPL CALCULATED.3IONS-SCNC: 5 MMOL/L (ref 3–14)
BUN SERPL-MCNC: 19 MG/DL (ref 7–30)
CALCIUM SERPL-MCNC: 9 MG/DL (ref 8.5–10.1)
CHLORIDE SERPL-SCNC: 106 MMOL/L (ref 94–109)
CO2 SERPL-SCNC: 28 MMOL/L (ref 20–32)
CREAT SERPL-MCNC: 1.01 MG/DL (ref 0.66–1.25)
ERYTHROCYTE [DISTWIDTH] IN BLOOD BY AUTOMATED COUNT: 12.7 % (ref 10–15)
GFR SERPL CREATININE-BSD FRML MDRD: 72 ML/MIN/{1.73_M2}
GLUCOSE SERPL-MCNC: 93 MG/DL (ref 70–99)
HCT VFR BLD AUTO: 39.7 % (ref 40–53)
HGB BLD-MCNC: 13.3 G/DL (ref 13.3–17.7)
MCH RBC QN AUTO: 30.9 PG (ref 26.5–33)
MCHC RBC AUTO-ENTMCNC: 33.5 G/DL (ref 31.5–36.5)
MCV RBC AUTO: 92 FL (ref 78–100)
PLATELET # BLD AUTO: 235 10E9/L (ref 150–450)
POTASSIUM SERPL-SCNC: 4.3 MMOL/L (ref 3.4–5.3)
RBC # BLD AUTO: 4.31 10E12/L (ref 4.4–5.9)
SODIUM SERPL-SCNC: 139 MMOL/L (ref 133–144)
WBC # BLD AUTO: 6.6 10E9/L (ref 4–11)

## 2019-11-07 PROCEDURE — 36415 COLL VENOUS BLD VENIPUNCTURE: CPT | Performed by: FAMILY MEDICINE

## 2019-11-07 PROCEDURE — 85027 COMPLETE CBC AUTOMATED: CPT | Performed by: FAMILY MEDICINE

## 2019-11-07 PROCEDURE — 93000 ELECTROCARDIOGRAM COMPLETE: CPT | Performed by: FAMILY MEDICINE

## 2019-11-07 PROCEDURE — 80048 BASIC METABOLIC PNL TOTAL CA: CPT | Performed by: FAMILY MEDICINE

## 2019-11-07 PROCEDURE — 99215 OFFICE O/P EST HI 40 MIN: CPT | Performed by: FAMILY MEDICINE

## 2019-11-07 RX ORDER — NAPROXEN SODIUM 550 MG/1
550 TABLET ORAL 2 TIMES DAILY WITH MEALS
Qty: 16 TABLET | Refills: 0 | Status: SHIPPED | OUTPATIENT
Start: 2019-11-07 | End: 2020-05-07

## 2019-11-07 ASSESSMENT — PAIN SCALES - GENERAL: PAINLEVEL: EXTREME PAIN (8)

## 2019-11-07 ASSESSMENT — MIFFLIN-ST. JEOR: SCORE: 1593.55

## 2019-11-08 ENCOUNTER — TELEPHONE (OUTPATIENT)
Dept: FAMILY MEDICINE | Facility: OTHER | Age: 74
End: 2019-11-08

## 2019-11-08 NOTE — LETTER
[facility]  [address]   [place] [address]  Phone: [phone]    11/08/19    Duane Tegg  [address] [address] [place] [address]      Duane,     All of your preop labs were normal.       Please let us know if you have any questions or concerns.     Sincerely,  Your [facility] Care Team       Component      Latest Ref Rng & Units 11/7/2019   Sodium      133 - 144 mmol/L 139   Potassium      3.4 - 5.3 mmol/L 4.3   Chloride      94 - 109 mmol/L 106   Carbon Dioxide      20 - 32 mmol/L 28   Anion Gap      3 - 14 mmol/L 5   Glucose      70 - 99 mg/dL 93   Urea Nitrogen      7 - 30 mg/dL 19   Creatinine      0.66 - 1.25 mg/dL 1.01   GFR Estimate      >60 mL/min/1.73:m2 72   GFR Estimate If Black      >60 mL/min/1.73:m2 83   Calcium      8.5 - 10.1 mg/dL 9.0   WBC      4.0 - 11.0 10e9/L 6.6   RBC Count      4.4 - 5.9 10e12/L 4.31 (L)   Hemoglobin      13.3 - 17.7 g/dL 13.3   Hematocrit      40.0 - 53.0 % 39.7 (L)   MCV      78 - 100 fl 92   MCH      26.5 - 33.0 pg 30.9   MCHC      31.5 - 36.5 g/dL 33.5   RDW      10.0 - 15.0 % 12.7   Platelet Count      150 - 450 10e9/L 235       Sincerely,      Heike Antonio MD

## 2019-11-22 ENCOUNTER — TRANSFERRED RECORDS (OUTPATIENT)
Dept: HEALTH INFORMATION MANAGEMENT | Facility: CLINIC | Age: 74
End: 2019-11-22

## 2019-11-29 DIAGNOSIS — I10 BENIGN ESSENTIAL HYPERTENSION: ICD-10-CM

## 2019-11-29 DIAGNOSIS — E78.5 HYPERLIPIDEMIA LDL GOAL <100: ICD-10-CM

## 2019-11-29 RX ORDER — LOSARTAN POTASSIUM 100 MG/1
TABLET ORAL
Qty: 90 TABLET | Refills: 1 | Status: SHIPPED | OUTPATIENT
Start: 2019-11-29 | End: 2020-05-07

## 2019-11-29 RX ORDER — AMLODIPINE BESYLATE 5 MG/1
TABLET ORAL
Qty: 90 TABLET | Refills: 1 | Status: SHIPPED | OUTPATIENT
Start: 2019-11-29 | End: 2020-05-07

## 2019-11-29 RX ORDER — ATORVASTATIN CALCIUM 20 MG/1
TABLET, FILM COATED ORAL
Qty: 90 TABLET | Refills: 1 | Status: SHIPPED | OUTPATIENT
Start: 2019-11-29 | End: 2020-05-07

## 2019-11-29 NOTE — TELEPHONE ENCOUNTER
Tried to contact patient via phone- no answer/VM box full.  ioSemanticst message sent.  Sonja Greene CMA ([facility])

## 2019-11-29 NOTE — LETTER
[address]  [facility] [address]  Phone: [phone]    12/02/19    Duane Teg  [address] [address] [address]      Dear Duane,      We have tried to reach you via phone without success.  We are writing to inform you that your medications have been refilled.  You are due for fasting labs prior to any further refills.  Please call to schedule.      Sincerely,      Heike Antonio MD

## 2019-11-29 NOTE — TELEPHONE ENCOUNTER
Reason for Call:  Medication or medication refill:    Do you use a [facility]?  Name of the pharmacy and phone number for the current request:  [facility]    Name of the medication requested: amLODIPine (NORVASC) 5 MG tablet , losartan (COZAAR) 100 MG tablet , atorvastatin (LIPITOR) 20 MG tablet     Other request: Patient just signed up for Pill Pack    Can we leave a detailed message on this number? NO    Phone number patient can be reached at: Other phone number:  Phone [phone]   FAX  [phone]    Best Time: any    Call taken on 11/29/2019 at 1:59 PM by Prince Arguello

## 2019-11-29 NOTE — TELEPHONE ENCOUNTER
Pending Prescriptions:                       Disp   Refills    amLODIPine (NORVASC) 5 MG tablet          90 tab*1            Sig: TAKE 1 TABLET(5 MG) BY MOUTH DAILY    Prescription approved per [facility] Refill Protocol.        atorvastatin (LIPITOR) 20 MG tablet       90 tab*1            Sig: TAKE 1 TABLET(20 MG) BY MOUTH DAILY    Routing refill request to provider for review/approval because:  Labs not current:  LDL  LDL Cholesterol Calculated   Date Value Ref Range Status   01/08/2016 105 (H) <100 mg/dL Final     Comment:     Above desirable:  100-129 mg/dl   Borderline High:  130-159 mg/dL   High:             160-189 mg/dL   Very high:       >189 mg/dl             losartan (COZAAR) 100 MG tablet           90 tab*1            Sig: TAKE 1 TABLET(100 MG) BY MOUTH DAILY    Prescription approved per [facility] Refill Protocol.      Debra Larson, RN, BSN

## 2019-12-02 NOTE — TELEPHONE ENCOUNTER
Tried to contact patient- no answer/VM box full.  Letter sent.  Sonja Greene CMA ([facility])

## 2019-12-05 DIAGNOSIS — E78.5 HYPERLIPIDEMIA LDL GOAL <100: ICD-10-CM

## 2019-12-06 RX ORDER — ATORVASTATIN CALCIUM 20 MG/1
TABLET, FILM COATED ORAL
Qty: 30 TABLET | Refills: 0 | Status: SHIPPED | OUTPATIENT
Start: 2019-12-06 | End: 2020-05-07

## 2019-12-06 NOTE — TELEPHONE ENCOUNTER
Pending Prescriptions:                       Disp   Refills    atorvastatin (LIPITOR) 20 MG tablet [Pharm*90 tab*0        Sig: TAKE 1 TABLET(20 MG) BY MOUTH DAILY    Routing refill request to provider for review/approval because:  LDL on file in past 12 months          Recent Labs   Lab Test 01/08/16  0945   *

## 2019-12-06 NOTE — TELEPHONE ENCOUNTER
Pt has not completed his lipid panel. Please advise to complete lab. Short script sent in the interim

## 2019-12-08 DIAGNOSIS — E78.5 HYPERLIPIDEMIA LDL GOAL <100: ICD-10-CM

## 2019-12-09 RX ORDER — ATORVASTATIN CALCIUM 20 MG/1
TABLET, FILM COATED ORAL
Qty: 90 TABLET | Refills: 0 | OUTPATIENT
Start: 2019-12-09

## 2019-12-23 ENCOUNTER — TRANSFERRED RECORDS (OUTPATIENT)
Dept: HEALTH INFORMATION MANAGEMENT | Facility: CLINIC | Age: 74
End: 2019-12-23

## 2020-01-13 DIAGNOSIS — I10 BENIGN ESSENTIAL HYPERTENSION: ICD-10-CM

## 2020-01-15 RX ORDER — AMLODIPINE BESYLATE 5 MG/1
TABLET ORAL
Qty: 90 TABLET | Refills: 1 | OUTPATIENT
Start: 2020-01-15

## 2020-01-15 NOTE — TELEPHONE ENCOUNTER
Pending Prescriptions:                       Disp   Refills    amLODIPine (NORVASC) 5 MG tablet          90 tab*1            Sig: TAKE 1 TABLET(5 MG) BY MOUTH DAILY    Sent 11/29/2019 with 6 month supply. Refill not appropriate at this time.     Peg Hughes, RN, BSN

## 2020-02-24 ENCOUNTER — HEALTH MAINTENANCE LETTER (OUTPATIENT)
Age: 75
End: 2020-02-24

## 2020-05-06 DIAGNOSIS — E78.5 HYPERLIPIDEMIA LDL GOAL <100: ICD-10-CM

## 2020-05-06 DIAGNOSIS — I10 BENIGN ESSENTIAL HYPERTENSION: ICD-10-CM

## 2020-05-07 RX ORDER — ATORVASTATIN CALCIUM 20 MG/1
TABLET, FILM COATED ORAL
Qty: 90 TABLET | Refills: 0 | Status: SHIPPED | OUTPATIENT
Start: 2020-05-07 | End: 2020-07-30

## 2020-05-07 RX ORDER — LOSARTAN POTASSIUM 100 MG/1
TABLET ORAL
Qty: 90 TABLET | Refills: 0 | Status: SHIPPED | OUTPATIENT
Start: 2020-05-07 | End: 2020-07-30

## 2020-05-07 RX ORDER — AMLODIPINE BESYLATE 5 MG/1
TABLET ORAL
Qty: 90 TABLET | Refills: 0 | Status: SHIPPED | OUTPATIENT
Start: 2020-05-07 | End: 2020-07-30

## 2020-05-07 NOTE — TELEPHONE ENCOUNTER
Pending Prescriptions:                       Disp   Refills    losartan (COZAAR) 100 MG tablet [Pharmacy*90 tab*0            Sig: TAKE 1 TABLET(100 MG) BY MOUTH DAILY    amLODIPine (NORVASC) 5 MG tablet [Pharmac*90 tab*0            Sig: TAKE 1 TABLET(5 MG) BY MOUTH DAILY  Refused Prescriptions:                       Disp   Refills    atorvastatin (LIPITOR) 20 MG tablet [Pharm*90 tab*0        Sig: TAKE 1 TABLET(20 MG) BY MOUTH DAILY    Routing refill request to provider for review/approval because:  Labs not current:  Lipid.    He uses pill pack: will need all refills together

## 2020-07-30 DIAGNOSIS — E78.5 HYPERLIPIDEMIA LDL GOAL <100: ICD-10-CM

## 2020-07-30 DIAGNOSIS — I10 BENIGN ESSENTIAL HYPERTENSION: ICD-10-CM

## 2020-07-30 RX ORDER — AMLODIPINE BESYLATE 5 MG/1
TABLET ORAL
Qty: 90 TABLET | Refills: 0 | Status: SHIPPED | OUTPATIENT
Start: 2020-07-30 | End: 2020-10-30

## 2020-07-30 RX ORDER — ATORVASTATIN CALCIUM 20 MG/1
TABLET, FILM COATED ORAL
Qty: 90 TABLET | Refills: 0 | Status: SHIPPED | OUTPATIENT
Start: 2020-07-30 | End: 2020-10-30

## 2020-07-30 RX ORDER — LOSARTAN POTASSIUM 100 MG/1
TABLET ORAL
Qty: 90 TABLET | Refills: 0 | Status: SHIPPED | OUTPATIENT
Start: 2020-07-30 | End: 2020-10-30

## 2020-07-30 NOTE — TELEPHONE ENCOUNTER
Pending Prescriptions:                       Disp   Refills    losartan (COZAAR) 100 MG tablet [Pharmacy*90 tab*0            Sig: TAKE 1 TABLET(100 MG) BY MOUTH DAILY    amLODIPine (NORVASC) 5 MG tablet [Pharmac*90 tab*0            Sig: TAKE 1 TABLET(5 MG) BY MOUTH DAILY    atorvastatin (LIPITOR) 20 MG tablet [Phar*90 tab*0            Sig: TAKE 1 TABLET(20 MG) BY MOUTH DAILY    Routing refill request to provider for review/approval because:  Labs not current:  Lipids    Peg Hughes, MSN, RN

## 2020-07-30 NOTE — TELEPHONE ENCOUNTER
LM for patient to return phone call to clinic about message below.  Sonja Greene CMA ([facility])

## 2020-07-30 NOTE — LETTER
[address]  [facility] [address]  Phone: [phone]    07/31/20    Duane Tegg  [address] [facility] [address]      Dear Duane,      We have tried to reach you via phone without success.  Your refills have been sent to the pharmacy.  You are due for a Physical prior to any further refills.  Please call and set an appointment up.      Sincerely,      [facility]

## 2020-07-31 NOTE — TELEPHONE ENCOUNTER
LM for patient to return phone call to clinic about message below.  Letter sent.  Sonja Greene CMA ([facility])

## 2020-08-17 NOTE — PROGRESS NOTES
"SUBJECTIVE:   Duane Tegg is a 75 year old male who presents for Preventive Visit.  click delete button to remove this line now    Are you in the first 12 months of your Medicare coverage?  No    Healthy Habits:     In general, how would you rate your overall health?  Good    Frequency of exercise:  4-5 days/week    Duration of exercise:  15-30 minutes    Do you usually eat at least 4 servings of fruit and vegetables a day, include whole grains    & fiber and avoid regularly eating high fat or \"junk\" foods?  Yes    Taking medications regularly:  Yes    Medication side effects:  None    Ability to successfully perform activities of daily living:  Transportation requires assistance and shopping requires assistance    Home Safety:  No safety concerns identified    Hearing Impairment:  No hearing concerns    In the past 6 months, have you been bothered by leaking of urine?  No    In general, how would you rate your overall mental or emotional health?  Good      PHQ-2 Total Score: 0    Additional concerns today:  No    Do you feel safe in your environment? Yes    Have you ever done Advance Care Planning? (For example, a Health Directive, POLST, or a discussion with a medical provider or your loved ones about your wishes): Yes, advance care planning is on file.    Fall risk  Fallen 2 or more times in the past year?: (P) Yes  Any fall with injury in the past year?: (P) No    Cognitive Screening   1) Repeat 3 items (Leader, Season, Table)    2) Clock draw: NORMAL  3) 3 item recall: Recalls 2 objects   Results: NORMAL clock, 1-2 items recalled: COGNITIVE IMPAIRMENT LESS LIKELY    Mini-CogTM Copyright ALEXANDRE Wellington. Licensed by the author for use in [facility]; reprinted with permission (julia@.[email or web address]). All rights reserved.      Do you have sleep apnea, excessive snoring or daytime drowsiness?: no    Reviewed and updated as needed this visit by clinical staff  Tobacco  Allergies  Meds         Reviewed and updated as " needed this visit by Provider        Social History     Tobacco Use     Smoking status: Former Smoker     Packs/day: 0.00     Smokeless tobacco: Never Used   Substance Use Topics     Alcohol use: Yes         Alcohol Use 8/21/2020   Prescreen: >3 drinks/day or >7 drinks/week? No   Prescreen: >3 drinks/day or >7 drinks/week? -           -------------------------------------    Current providers sharing in care for this patient include:   Patient Care Team:  Heike Antonio MD as PCP - General (Family Practice)  Heike Antonio MD as Assigned PCP    The following health maintenance items are reviewed in Epic and correct as of today:  Health Maintenance   Topic Date Due     ADVANCE CARE PLANNING  1945     ZOSTER IMMUNIZATION (1 of 2) 08/14/1993     FALL RISK ASSESSMENT  07/12/2019     PHQ-2  01/01/2020     INFLUENZA VACCINE (1) 09/01/2020     BMP  11/07/2020     MEDICARE ANNUAL WELLNESS VISIT  08/21/2021     DTAP/TDAP/TD IMMUNIZATION (3 - Td) 05/10/2029     PNEUMOCOCCAL IMMUNIZATION 65+ LOW/MEDIUM RISK  Completed     IPV IMMUNIZATION  Aged Out     MENINGITIS IMMUNIZATION  Aged Out     HEPATITIS B IMMUNIZATION  Aged Out     BP Readings from Last 3 Encounters:   08/21/20 122/68   11/07/19 124/78   05/10/19 130/76    Wt Readings from Last 3 Encounters:   08/21/20 90.4 kg (199 lb 6.4 oz)   11/07/19 88.9 kg (196 lb)   05/10/19 92.3 kg (203 lb 8 oz)                  Patient Active Problem List   Diagnosis     Right bundle branch block     Benign essential hypertension     Mild cognitive impairment     Dementia without behavioral disturbance, unspecified dementia type (H)     Lumbago     Weakness of both lower extremities     Spinal stenosis of lumbar region with neurogenic claudication     Viral upper respiratory illness     Hyperlipidemia LDL goal <130     Leg weakness, bilateral     Past Surgical History:   Procedure Laterality Date     COLONOSCOPY N/A 1/6/2016    Procedure: COMBINED COLONOSCOPY, SINGLE OR  MULTIPLE BIOPSY/POLYPECTOMY BY BIOPSY;  Surgeon: Ryan Nicholas MD;  Location: [facility]       Social History     Tobacco Use     Smoking status: Former Smoker     Packs/day: 0.00     Smokeless tobacco: Never Used   Substance Use Topics     Alcohol use: Yes     Family History   Problem Relation Age of Onset     Diabetes Father          Current Outpatient Medications   Medication Sig Dispense Refill     amLODIPine (NORVASC) 5 MG tablet TAKE 1 TABLET(5 MG) BY MOUTH DAILY 90 tablet 0     aspirin 81 MG tablet Take 1 tablet (81 mg) by mouth daily 90 tablet 3     atorvastatin (LIPITOR) 20 MG tablet TAKE 1 TABLET(20 MG) BY MOUTH DAILY 90 tablet 0     Ginkgo Biloba 40 MG TABS        losartan (COZAAR) 100 MG tablet TAKE 1 TABLET(100 MG) BY MOUTH DAILY 90 tablet 0     order for DME Equipment being ordered: blood pressure machine and cuff 1 each 0     Turmeric 500 MG CAPS Take 500 mg by mouth daily       vitamin B-12 (CYANOCOBALAMIN) 1000 MCG tablet Take 1,000 mcg by mouth daily       No Known Allergies  Recent Labs   Lab Test 11/07/19  1732 09/29/18  1036 09/28/18  2321 03/10/17 12/26/16  1145 01/08/16  0945   LDL  --   --   --   --   --  105*   HDL  --   --   --  40  --  35*   TRIG  --   --   --  130  --  162*   ALT  --   --  52 29 29  --    CR 1.01 0.79 0.91 0.9 0.78 0.77   GFRESTIMATED 72 >90 81 >60 >90  Non  GFR Calc   >90  Non  GFR Calc     GFRESTBLACK 83 >90 >90 >60 >90   GFR Calc   >90   GFR Calc     POTASSIUM 4.3 4.1 3.9 4.6 4.2 4.3   TSH  --   --   --   --  1.14 1.11      Shingrix- to get at the pharmacy    Review of Systems   Constitutional: Negative for chills and fever.   HENT: Negative for congestion, ear pain, hearing loss and sore throat.    Eyes: Negative for pain and visual disturbance.   Respiratory: Negative for cough and shortness of breath.    Cardiovascular: Positive for peripheral edema. Negative for chest pain and palpitations.  "  Gastrointestinal: Positive for constipation. Negative for abdominal pain, diarrhea, heartburn, hematochezia and nausea.   Genitourinary: Negative for discharge, dysuria, frequency, genital sores, hematuria, impotence and urgency.   Musculoskeletal: Negative for arthralgias, joint swelling and myalgias.   Skin: Negative for rash.   Neurological: Negative for dizziness, weakness, headaches and paresthesias.   Psychiatric/Behavioral: Negative for mood changes. The patient is not nervous/anxious.      OBJECTIVE:   /68 (BP Location: Left arm, Patient Position: Chair, Cuff Size: Adult Regular)   Pulse 74   Temp 98  F (36.7  C) (Temporal)   Resp 20   Ht 1.727 m (5' 8\")   Wt 90.4 kg (199 lb 6.4 oz)   SpO2 96%   BMI 30.32 kg/m   Estimated body mass index is 30.32 kg/m  as calculated from the following:    Height as of this encounter: 1.727 m (5' 8\").    Weight as of this encounter: 90.4 kg (199 lb 6.4 oz).  Physical Exam  Constitutional:       Appearance: Normal appearance. He is well-developed and normal weight.   HENT:      Head: Normocephalic and atraumatic.      Right Ear: Tympanic membrane, ear canal and external ear normal. There is no impacted cerumen.      Left Ear: Tympanic membrane, ear canal and external ear normal. There is no impacted cerumen.      Nose: Nose normal. No rhinorrhea.      Mouth/Throat:      Mouth: Mucous membranes are moist.      Pharynx: No oropharyngeal exudate or posterior oropharyngeal erythema.   Eyes:      Extraocular Movements: Extraocular movements intact.      Conjunctiva/sclera: Conjunctivae normal.      Pupils: Pupils are equal, round, and reactive to light.   Neck:      Musculoskeletal: Normal range of motion and neck supple.   Cardiovascular:      Rate and Rhythm: Normal rate and regular rhythm.      Pulses: Normal pulses.      Heart sounds: Normal heart sounds. No murmur. No friction rub. No gallop.    Pulmonary:      Effort: Pulmonary effort is normal. No respiratory " "distress.      Breath sounds: Normal breath sounds. No stridor. No wheezing, rhonchi or rales.   Chest:      Chest wall: No tenderness.   Abdominal:      General: Bowel sounds are normal. There is no distension.      Palpations: Abdomen is soft. There is no mass.      Tenderness: There is no abdominal tenderness. There is no right CVA tenderness, left CVA tenderness, guarding or rebound.      Hernia: No hernia is present.   Musculoskeletal: Normal range of motion.   Neurological:      General: No focal deficit present.      Mental Status: He is alert and oriented to person, place, and time.      Cranial Nerves: No cranial nerve deficit.      Sensory: No sensory deficit.      Motor: No weakness.      Coordination: Coordination normal.      Gait: Gait normal.      Deep Tendon Reflexes: Reflexes normal.   Psychiatric:         Mood and Affect: Mood normal.         Behavior: Behavior normal.         Thought Content: Thought content normal.         Judgment: Judgment normal.         Diagnostic Test Results:  Labs reviewed in Epic    ASSESSMENT / PLAN:     Problem List Items Addressed This Visit     Dementia without behavioral disturbance, unspecified dementia type (H)      Other Visit Diagnoses     Encounter for routine adult health examination without abnormal findings    -  Primary    Hyperlipidemia LDL goal <100        Relevant Orders    Lipid panel reflex to direct LDL Fasting    Comprehensive metabolic panel (BMP + Alb, Alk Phos, ALT, AST, Total. Bili, TP)    Hyperglycemia        Relevant Orders    Hemoglobin A1c            COUNSELING:  Reviewed preventive health counseling, as reflected in patient instructions       Regular exercise       Healthy diet/nutrition    Estimated body mass index is 30.32 kg/m  as calculated from the following:    Height as of this encounter: 1.727 m (5' 8\").    Weight as of this encounter: 90.4 kg (199 lb 6.4 oz).         reports that he has quit smoking. He smoked 0.00 packs per day. He " has never used smokeless tobacco.      Appropriate preventive services were discussed with this patient, including applicable screening as appropriate for cardiovascular disease, diabetes, osteopenia/osteoporosis, and glaucoma.  As appropriate for age/gender, discussed screening for colorectal cancer, prostate cancer, breast cancer, and cervical cancer. Checklist reviewing preventive services available has been given to the patient.    Reviewed patients plan of care and provided an AVS. The Basic Care Plan (routine screening as documented in Health Maintenance) for Duane meets the Care Plan requirement. This Care Plan has been established and reviewed with the Patient.    Counseling Resources:  ATP IV Guidelines  Pooled Cohorts Equation Calculator  Breast Cancer Risk Calculator  FRAX Risk Assessment  ICSI Preventive Guidelines  Dietary Guidelines for Americans, 2010  USDA's MyPlate  ASA Prophylaxis  Lung CA Screening    Heike Antonio MD  [facility]    Identified Health Risks:

## 2020-08-21 ENCOUNTER — OFFICE VISIT (OUTPATIENT)
Dept: FAMILY MEDICINE | Facility: OTHER | Age: 75
End: 2020-08-21
Payer: COMMERCIAL

## 2020-08-21 VITALS
SYSTOLIC BLOOD PRESSURE: 122 MMHG | OXYGEN SATURATION: 96 % | BODY MASS INDEX: 30.22 KG/M2 | RESPIRATION RATE: 20 BRPM | DIASTOLIC BLOOD PRESSURE: 68 MMHG | HEIGHT: 68 IN | WEIGHT: 199.4 LBS | HEART RATE: 74 BPM | TEMPERATURE: 98 F

## 2020-08-21 DIAGNOSIS — Z00.00 ENCOUNTER FOR ROUTINE ADULT HEALTH EXAMINATION WITHOUT ABNORMAL FINDINGS: Primary | ICD-10-CM

## 2020-08-21 DIAGNOSIS — E78.5 HYPERLIPIDEMIA LDL GOAL <100: ICD-10-CM

## 2020-08-21 DIAGNOSIS — F03.90 DEMENTIA WITHOUT BEHAVIORAL DISTURBANCE, UNSPECIFIED DEMENTIA TYPE: ICD-10-CM

## 2020-08-21 DIAGNOSIS — R73.9 HYPERGLYCEMIA: ICD-10-CM

## 2020-08-21 PROCEDURE — 99397 PER PM REEVAL EST PAT 65+ YR: CPT | Performed by: FAMILY MEDICINE

## 2020-08-21 RX ORDER — VIT C/B6/B5/MAGNESIUM/HERB 173 50-5-6-5MG
500 CAPSULE ORAL DAILY
COMMUNITY
End: 2022-12-14

## 2020-08-21 RX ORDER — ASCORBIC ACID 1000 MG
TABLET ORAL
COMMUNITY
End: 2022-12-14

## 2020-08-21 ASSESSMENT — ENCOUNTER SYMPTOMS
PALPITATIONS: 0
DIZZINESS: 0
HEMATOCHEZIA: 0
ARTHRALGIAS: 0
NAUSEA: 0
COUGH: 0
FREQUENCY: 0
WEAKNESS: 0
DYSURIA: 0
EYE PAIN: 0
CHILLS: 0
MYALGIAS: 0
ABDOMINAL PAIN: 0
CONSTIPATION: 1
DIARRHEA: 0
SHORTNESS OF BREATH: 0
FEVER: 0
SORE THROAT: 0
JOINT SWELLING: 0
HEARTBURN: 0
PARESTHESIAS: 0
HEMATURIA: 0
HEADACHES: 0
NERVOUS/ANXIOUS: 0

## 2020-08-21 ASSESSMENT — MIFFLIN-ST. JEOR: SCORE: 1603.97

## 2020-08-21 ASSESSMENT — ACTIVITIES OF DAILY LIVING (ADL)
CURRENT_FUNCTION: TRANSPORTATION REQUIRES ASSISTANCE
CURRENT_FUNCTION: SHOPPING REQUIRES ASSISTANCE

## 2020-08-24 ENCOUNTER — TELEPHONE (OUTPATIENT)
Dept: FAMILY MEDICINE | Facility: OTHER | Age: 75
End: 2020-08-24

## 2020-08-24 DIAGNOSIS — R73.9 HYPERGLYCEMIA: ICD-10-CM

## 2020-08-24 DIAGNOSIS — E78.5 HYPERLIPIDEMIA LDL GOAL <100: ICD-10-CM

## 2020-08-24 LAB
ALBUMIN SERPL-MCNC: 3.5 G/DL (ref 3.4–5)
ALP SERPL-CCNC: 90 U/L (ref 40–150)
ALT SERPL W P-5'-P-CCNC: 36 U/L (ref 0–70)
ANION GAP SERPL CALCULATED.3IONS-SCNC: 6 MMOL/L (ref 3–14)
AST SERPL W P-5'-P-CCNC: 22 U/L (ref 0–45)
BILIRUB SERPL-MCNC: 0.7 MG/DL (ref 0.2–1.3)
BUN SERPL-MCNC: 13 MG/DL (ref 7–30)
CALCIUM SERPL-MCNC: 9.5 MG/DL (ref 8.5–10.1)
CHLORIDE SERPL-SCNC: 108 MMOL/L (ref 94–109)
CHOLEST SERPL-MCNC: 108 MG/DL
CO2 SERPL-SCNC: 25 MMOL/L (ref 20–32)
CREAT SERPL-MCNC: 0.86 MG/DL (ref 0.66–1.25)
GFR SERPL CREATININE-BSD FRML MDRD: 84 ML/MIN/{1.73_M2}
GLUCOSE SERPL-MCNC: 96 MG/DL (ref 70–99)
HBA1C MFR BLD: 5.5 % (ref 0–5.6)
HDLC SERPL-MCNC: 38 MG/DL
LDLC SERPL CALC-MCNC: 51 MG/DL
NONHDLC SERPL-MCNC: 70 MG/DL
POTASSIUM SERPL-SCNC: 4.3 MMOL/L (ref 3.4–5.3)
PROT SERPL-MCNC: 7.1 G/DL (ref 6.8–8.8)
SODIUM SERPL-SCNC: 139 MMOL/L (ref 133–144)
TRIGL SERPL-MCNC: 94 MG/DL

## 2020-08-24 PROCEDURE — 36415 COLL VENOUS BLD VENIPUNCTURE: CPT | Performed by: FAMILY MEDICINE

## 2020-08-24 PROCEDURE — 83036 HEMOGLOBIN GLYCOSYLATED A1C: CPT | Performed by: FAMILY MEDICINE

## 2020-08-24 PROCEDURE — 80061 LIPID PANEL: CPT | Performed by: FAMILY MEDICINE

## 2020-08-24 PROCEDURE — 80053 COMPREHEN METABOLIC PANEL: CPT | Performed by: FAMILY MEDICINE

## 2020-08-24 NOTE — TELEPHONE ENCOUNTER
----- Message from Heike Antonio MD sent at 8/24/2020  1:46 PM CDT -----  Normal kidney function and normal cholesterol levels

## 2020-10-28 DIAGNOSIS — I10 BENIGN ESSENTIAL HYPERTENSION: ICD-10-CM

## 2020-10-28 DIAGNOSIS — E78.5 HYPERLIPIDEMIA LDL GOAL <100: ICD-10-CM

## 2020-10-30 RX ORDER — AMLODIPINE BESYLATE 5 MG/1
TABLET ORAL
Qty: 90 TABLET | Refills: 0 | Status: SHIPPED | OUTPATIENT
Start: 2020-10-30 | End: 2021-01-29

## 2020-10-30 RX ORDER — LOSARTAN POTASSIUM 100 MG/1
TABLET ORAL
Qty: 90 TABLET | Refills: 0 | Status: SHIPPED | OUTPATIENT
Start: 2020-10-30 | End: 2021-01-29

## 2020-10-30 RX ORDER — ATORVASTATIN CALCIUM 20 MG/1
TABLET, FILM COATED ORAL
Qty: 90 TABLET | Refills: 0 | Status: SHIPPED | OUTPATIENT
Start: 2020-10-30 | End: 2021-01-29

## 2020-10-30 NOTE — TELEPHONE ENCOUNTER
"Requested Prescriptions   Pending Prescriptions Disp Refills     atorvastatin (LIPITOR) 20 MG tablet [Pharmacy Med Name: Atorvastatin Calcium 20mg Tablet] 90 tablet 0     Sig: Take 1 tablet by mouth daily.       Statins Protocol Passed - 10/28/2020  4:21 AM        Passed - LDL on file in past 12 months     Recent Labs   Lab Test 08/24/20  0943   LDL 51             Passed - No abnormal creatine kinase in past 12 months     No lab results found.             Passed - Recent (12 mo) or future (30 days) visit within the authorizing provider's specialty     Patient has had an office visit with the authorizing provider or a provider within the authorizing providers department within the previous 12 mos or has a future within next 30 days. See \"Patient Info\" tab in inbasket, or \"Choose Columns\" in Meds & Orders section of the refill encounter.              Passed - Medication is active on med list        Passed - Patient is age 18 or older           losartan (COZAAR) 100 MG tablet [Pharmacy Med Name: Losartan Potassium 100mg Tablet] 90 tablet 0     Sig: Take 1 tablet by mouth daily.       Angiotensin-II Receptors Passed - 10/28/2020  4:21 AM        Passed - Last blood pressure under 140/90 in past 12 months     BP Readings from Last 3 Encounters:   08/21/20 122/68   11/07/19 124/78   05/10/19 130/76                 Passed - Recent (12 mo) or future (30 days) visit within the authorizing provider's specialty     Patient has had an office visit with the authorizing provider or a provider within the authorizing providers department within the previous 12 mos or has a future within next 30 days. See \"Patient Info\" tab in inbasket, or \"Choose Columns\" in Meds & Orders section of the refill encounter.              Passed - Medication is active on med list        Passed - Patient is age 18 or older        Passed - Normal serum creatinine on file in past 12 months     Recent Labs   Lab Test 08/24/20  0943   CR 0.86       Ok to " "refill medication if creatinine is low          Passed - Normal serum potassium on file in past 12 months     Recent Labs   Lab Test 08/24/20  0943   POTASSIUM 4.3                       amLODIPine (NORVASC) 5 MG tablet [Pharmacy Med Name: Amlodipine Besylate 5mg Tablet] 90 tablet 0     Sig: Take 1 tablet by mouth daily.       Calcium Channel Blockers Protocol  Passed - 10/28/2020  4:21 AM        Passed - Blood pressure under 140/90 in past 12 months     BP Readings from Last 3 Encounters:   08/21/20 122/68   11/07/19 124/78   05/10/19 130/76                 Passed - Recent (12 mo) or future (30 days) visit within the authorizing provider's specialty     Patient has had an office visit with the authorizing provider or a provider within the authorizing providers department within the previous 12 mos or has a future within next 30 days. See \"Patient Info\" tab in inbasket, or \"Choose Columns\" in Meds & Orders section of the refill encounter.              Passed - Medication is active on med list        Passed - Patient is age 18 or older        Passed - Normal serum creatinine on file in past 12 months     Recent Labs   Lab Test 08/24/20  0943   CR 0.86       Ok to refill medication if creatinine is low             Prescription approved per [facility] Refill Protocol.    Jarrod Oleary, RN, BSN    "

## 2020-12-13 ENCOUNTER — HEALTH MAINTENANCE LETTER (OUTPATIENT)
Age: 75
End: 2020-12-13

## 2021-01-29 DIAGNOSIS — I10 BENIGN ESSENTIAL HYPERTENSION: ICD-10-CM

## 2021-01-29 DIAGNOSIS — E78.5 HYPERLIPIDEMIA LDL GOAL <100: ICD-10-CM

## 2021-01-29 RX ORDER — ATORVASTATIN CALCIUM 20 MG/1
TABLET, FILM COATED ORAL
Qty: 90 TABLET | Refills: 0 | Status: SHIPPED | OUTPATIENT
Start: 2021-01-29 | End: 2021-04-27

## 2021-01-29 RX ORDER — LOSARTAN POTASSIUM 100 MG/1
TABLET ORAL
Qty: 90 TABLET | Refills: 0 | Status: SHIPPED | OUTPATIENT
Start: 2021-01-29 | End: 2021-04-27

## 2021-01-29 RX ORDER — AMLODIPINE BESYLATE 5 MG/1
TABLET ORAL
Qty: 90 TABLET | Refills: 0 | Status: SHIPPED | OUTPATIENT
Start: 2021-01-29 | End: 2021-04-27

## 2021-01-29 NOTE — TELEPHONE ENCOUNTER
"Requested Prescriptions   Pending Prescriptions Disp Refills     atorvastatin (LIPITOR) 20 MG tablet [Pharmacy Med Name: Atorvastatin Calcium 20mg Tablet] 90 tablet 0     Sig: Take 1 tablet by mouth daily.       Statins Protocol Passed - 1/29/2021  5:26 AM        Passed - LDL on file in past 12 months     Recent Labs   Lab Test 08/24/20  0943   LDL 51             Passed - No abnormal creatine kinase in past 12 months     No lab results found.             Passed - Recent (12 mo) or future (30 days) visit within the authorizing provider's specialty     Patient has had an office visit with the authorizing provider or a provider within the authorizing providers department within the previous 12 mos or has a future within next 30 days. See \"Patient Info\" tab in inbasket, or \"Choose Columns\" in Meds & Orders section of the refill encounter.              Passed - Medication is active on med list        Passed - Patient is age 18 or older           losartan (COZAAR) 100 MG tablet [Pharmacy Med Name: Losartan Potassium 100mg Tablet] 90 tablet 0     Sig: Take 1 tablet by mouth daily.       Angiotensin-II Receptors Passed - 1/29/2021  5:26 AM        Passed - Last blood pressure under 140/90 in past 12 months     BP Readings from Last 3 Encounters:   08/21/20 122/68   11/07/19 124/78   05/10/19 130/76                 Passed - Recent (12 mo) or future (30 days) visit within the authorizing provider's specialty     Patient has had an office visit with the authorizing provider or a provider within the authorizing providers department within the previous 12 mos or has a future within next 30 days. See \"Patient Info\" tab in inbasket, or \"Choose Columns\" in Meds & Orders section of the refill encounter.              Passed - Medication is active on med list        Passed - Patient is age 18 or older        Passed - Normal serum creatinine on file in past 12 months     Recent Labs   Lab Test 08/24/20  0943   CR 0.86       Ok to refill " "medication if creatinine is low          Passed - Normal serum potassium on file in past 12 months     Recent Labs   Lab Test 08/24/20  0943   POTASSIUM 4.3                       amLODIPine (NORVASC) 5 MG tablet [Pharmacy Med Name: Amlodipine Besylate 5mg Tablet] 90 tablet 0     Sig: Take 1 tablet by mouth daily.       Calcium Channel Blockers Protocol  Passed - 1/29/2021  5:26 AM        Passed - Blood pressure under 140/90 in past 12 months     BP Readings from Last 3 Encounters:   08/21/20 122/68   11/07/19 124/78   05/10/19 130/76                 Passed - Recent (12 mo) or future (30 days) visit within the authorizing provider's specialty     Patient has had an office visit with the authorizing provider or a provider within the authorizing providers department within the previous 12 mos or has a future within next 30 days. See \"Patient Info\" tab in inbasket, or \"Choose Columns\" in Meds & Orders section of the refill encounter.              Passed - Medication is active on med list        Passed - Patient is age 18 or older        Passed - Normal serum creatinine on file in past 12 months     Recent Labs   Lab Test 08/24/20  0943   CR 0.86       Ok to refill medication if creatinine is low             Prescription approved per [facility] Refill Protocol.    Jarrod Oleary, RN, BSN    "

## 2021-02-23 ENCOUNTER — IMMUNIZATION (OUTPATIENT)
Dept: FAMILY MEDICINE | Facility: CLINIC | Age: 76
End: 2021-02-23
Payer: COMMERCIAL

## 2021-02-23 PROCEDURE — 91300 PR COVID VAC PFIZER DIL RECON 30 MCG/0.3 ML IM: CPT

## 2021-02-23 PROCEDURE — 0001A PR COVID VAC PFIZER DIL RECON 30 MCG/0.3 ML IM: CPT

## 2021-03-16 ENCOUNTER — IMMUNIZATION (OUTPATIENT)
Dept: FAMILY MEDICINE | Facility: CLINIC | Age: 76
End: 2021-03-16
Attending: FAMILY MEDICINE
Payer: COMMERCIAL

## 2021-03-16 PROCEDURE — 91300 PR COVID VAC PFIZER DIL RECON 30 MCG/0.3 ML IM: CPT

## 2021-03-16 PROCEDURE — 0002A PR COVID VAC PFIZER DIL RECON 30 MCG/0.3 ML IM: CPT

## 2021-04-26 DIAGNOSIS — I10 BENIGN ESSENTIAL HYPERTENSION: ICD-10-CM

## 2021-04-26 DIAGNOSIS — E78.5 HYPERLIPIDEMIA LDL GOAL <100: ICD-10-CM

## 2021-04-27 RX ORDER — AMLODIPINE BESYLATE 5 MG/1
TABLET ORAL
Qty: 90 TABLET | Refills: 0 | Status: SHIPPED | OUTPATIENT
Start: 2021-04-27 | End: 2021-07-30

## 2021-04-27 RX ORDER — LOSARTAN POTASSIUM 100 MG/1
TABLET ORAL
Qty: 90 TABLET | Refills: 0 | Status: SHIPPED | OUTPATIENT
Start: 2021-04-27 | End: 2021-07-30

## 2021-04-27 RX ORDER — ATORVASTATIN CALCIUM 20 MG/1
TABLET, FILM COATED ORAL
Qty: 90 TABLET | Refills: 0 | Status: SHIPPED | OUTPATIENT
Start: 2021-04-27 | End: 2021-07-30

## 2021-07-30 DIAGNOSIS — E78.5 HYPERLIPIDEMIA LDL GOAL <100: ICD-10-CM

## 2021-07-30 DIAGNOSIS — I10 BENIGN ESSENTIAL HYPERTENSION: ICD-10-CM

## 2021-07-30 RX ORDER — LOSARTAN POTASSIUM 100 MG/1
TABLET ORAL
Qty: 90 TABLET | Refills: 0 | Status: SHIPPED | OUTPATIENT
Start: 2021-07-30 | End: 2021-10-29

## 2021-07-30 RX ORDER — ATORVASTATIN CALCIUM 20 MG/1
TABLET, FILM COATED ORAL
Qty: 90 TABLET | Refills: 0 | Status: SHIPPED | OUTPATIENT
Start: 2021-07-30 | End: 2021-10-29

## 2021-07-30 RX ORDER — AMLODIPINE BESYLATE 5 MG/1
TABLET ORAL
Qty: 90 TABLET | Refills: 0 | Status: SHIPPED | OUTPATIENT
Start: 2021-07-30 | End: 2021-10-29

## 2021-07-30 NOTE — TELEPHONE ENCOUNTER
Pending Prescriptions:                       Disp   Refills    amLODIPine (NORVASC) 5 MG tablet [Pharmac*90 tab*0            Sig: Take 1 tablet by mouth daily.    atorvastatin (LIPITOR) 20 MG tablet [Phar*90 tab*0            Sig: Take 1 tablet by mouth daily.    losartan (COZAAR) 100 MG tablet [Pharmacy*90 tab*0            Sig: Take 1 tablet by mouth daily.    Medication is being filled for 1 time rickey refill only due to:  Patient is due for (around 8/21/2021) for Physical Exam.    Please call and help schedule.  Thank you!
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
## 2021-09-26 ENCOUNTER — HEALTH MAINTENANCE LETTER (OUTPATIENT)
Age: 76
End: 2021-09-26

## 2021-10-28 DIAGNOSIS — E78.5 HYPERLIPIDEMIA LDL GOAL <100: ICD-10-CM

## 2021-10-28 DIAGNOSIS — I10 BENIGN ESSENTIAL HYPERTENSION: ICD-10-CM

## 2021-10-29 RX ORDER — ATORVASTATIN CALCIUM 20 MG/1
TABLET, FILM COATED ORAL
Qty: 30 TABLET | Refills: 0 | Status: SHIPPED | OUTPATIENT
Start: 2021-10-29 | End: 2021-12-22

## 2021-10-29 RX ORDER — AMLODIPINE BESYLATE 5 MG/1
TABLET ORAL
Qty: 30 TABLET | Refills: 0 | Status: SHIPPED | OUTPATIENT
Start: 2021-10-29 | End: 2021-12-22

## 2021-10-29 RX ORDER — LOSARTAN POTASSIUM 100 MG/1
TABLET ORAL
Qty: 30 TABLET | Refills: 0 | Status: SHIPPED | OUTPATIENT
Start: 2021-10-29 | End: 2021-12-22

## 2021-10-29 NOTE — TELEPHONE ENCOUNTER
Pending Prescriptions:                       Disp   Refills    losartan (COZAAR) 100 MG tablet [Pharmacy *90 tab*0        Sig: Take 1 tablet by mouth daily.    amLODIPine (NORVASC) 5 MG tablet [Pharmacy*90 tab*0        Sig: Take 1 tablet by mouth daily.    atorvastatin (LIPITOR) 20 MG tablet [Pharm*90 tab*0        Sig: Take 1 tablet by mouth daily.    Routing refill request to provider for review/approval because:  Labs not current:    BP Readings from Last 3 Encounters:   08/21/20 122/68   11/07/19 124/78   05/10/19 130/76     Potassium   Date Value Ref Range Status   08/24/2020 4.3 3.4 - 5.3 mmol/L Final     Creatinine   Date Value Ref Range Status   08/24/2020 0.86 0.66 - 1.25 mg/dL Final      over 13 months, RN unable to provide a rickey refill.

## 2021-10-29 NOTE — TELEPHONE ENCOUNTER
RK patient, due for visit. Danica have been given.       EDDI Fierro CNP  Questions or concerns please feel free to send me a [facility] message or call me  Phone : [phone]

## 2021-11-25 DIAGNOSIS — I10 BENIGN ESSENTIAL HYPERTENSION: ICD-10-CM

## 2021-11-25 DIAGNOSIS — E78.5 HYPERLIPIDEMIA LDL GOAL <100: ICD-10-CM

## 2021-11-30 NOTE — TELEPHONE ENCOUNTER
Pending Prescriptions:                       Disp   Refills    losartan (COZAAR) 100 MG tablet [Pharmacy *30 tab*0        Sig: Take 1 tablet by mouth daily.    atorvastatin (LIPITOR) 20 MG tablet [Pharm*30 tab*0        Sig: Take 1 tablet by mouth daily.    amLODIPine (NORVASC) 5 MG tablet [Pharmacy*30 tab*0        Sig: Take 1 tablet by mouth daily.      Routing refill request to provider for review/approval because:  Danica given x1 and patient did not follow up, please advise    Stacy Woods RN

## 2021-12-02 RX ORDER — LOSARTAN POTASSIUM 100 MG/1
TABLET ORAL
Qty: 30 TABLET | Refills: 0 | OUTPATIENT
Start: 2021-12-02

## 2021-12-02 RX ORDER — AMLODIPINE BESYLATE 5 MG/1
TABLET ORAL
Qty: 30 TABLET | Refills: 0 | OUTPATIENT
Start: 2021-12-02

## 2021-12-02 RX ORDER — ATORVASTATIN CALCIUM 20 MG/1
TABLET, FILM COATED ORAL
Qty: 30 TABLET | Refills: 0 | OUTPATIENT
Start: 2021-12-02

## 2021-12-22 ENCOUNTER — OFFICE VISIT (OUTPATIENT)
Dept: FAMILY MEDICINE | Facility: OTHER | Age: 76
End: 2021-12-22
Payer: COMMERCIAL

## 2021-12-22 VITALS
BODY MASS INDEX: 30.62 KG/M2 | DIASTOLIC BLOOD PRESSURE: 74 MMHG | HEIGHT: 68 IN | HEART RATE: 73 BPM | WEIGHT: 202 LBS | OXYGEN SATURATION: 96 % | TEMPERATURE: 97.6 F | SYSTOLIC BLOOD PRESSURE: 128 MMHG | RESPIRATION RATE: 18 BRPM

## 2021-12-22 DIAGNOSIS — F02.80 EARLY ONSET ALZHEIMER'S DEMENTIA WITHOUT BEHAVIORAL DISTURBANCE (H): ICD-10-CM

## 2021-12-22 DIAGNOSIS — E78.5 HYPERLIPIDEMIA LDL GOAL <100: ICD-10-CM

## 2021-12-22 DIAGNOSIS — Z23 HIGH PRIORITY FOR 2019-NCOV VACCINE: ICD-10-CM

## 2021-12-22 DIAGNOSIS — G30.0 EARLY ONSET ALZHEIMER'S DEMENTIA WITHOUT BEHAVIORAL DISTURBANCE (H): ICD-10-CM

## 2021-12-22 DIAGNOSIS — Z00.00 MEDICARE ANNUAL WELLNESS VISIT, SUBSEQUENT: Primary | ICD-10-CM

## 2021-12-22 DIAGNOSIS — I10 BENIGN ESSENTIAL HYPERTENSION: ICD-10-CM

## 2021-12-22 DIAGNOSIS — M48.062 SPINAL STENOSIS OF LUMBAR REGION WITH NEUROGENIC CLAUDICATION: ICD-10-CM

## 2021-12-22 DIAGNOSIS — E78.5 HYPERLIPIDEMIA LDL GOAL <130: ICD-10-CM

## 2021-12-22 DIAGNOSIS — R73.9 HYPERGLYCEMIA: ICD-10-CM

## 2021-12-22 DIAGNOSIS — F03.90 DEMENTIA WITHOUT BEHAVIORAL DISTURBANCE, UNSPECIFIED DEMENTIA TYPE: ICD-10-CM

## 2021-12-22 PROBLEM — R29.898 LEG WEAKNESS, BILATERAL: Status: RESOLVED | Noted: 2018-09-29 | Resolved: 2021-12-22

## 2021-12-22 PROBLEM — R29.898 WEAKNESS OF BOTH LOWER EXTREMITIES: Status: RESOLVED | Noted: 2018-09-29 | Resolved: 2021-12-22

## 2021-12-22 PROBLEM — J06.9 VIRAL UPPER RESPIRATORY ILLNESS: Status: RESOLVED | Noted: 2018-09-29 | Resolved: 2021-12-22

## 2021-12-22 LAB
ALBUMIN SERPL-MCNC: 3.8 G/DL (ref 3.4–5)
ALP SERPL-CCNC: 95 U/L (ref 40–150)
ALT SERPL W P-5'-P-CCNC: 29 U/L (ref 0–70)
ANION GAP SERPL CALCULATED.3IONS-SCNC: 5 MMOL/L (ref 3–14)
AST SERPL W P-5'-P-CCNC: 18 U/L (ref 0–45)
BILIRUB SERPL-MCNC: 1 MG/DL (ref 0.2–1.3)
BUN SERPL-MCNC: 18 MG/DL (ref 7–30)
CALCIUM SERPL-MCNC: 9.2 MG/DL (ref 8.5–10.1)
CHLORIDE BLD-SCNC: 110 MMOL/L (ref 94–109)
CHOLEST SERPL-MCNC: 101 MG/DL
CO2 SERPL-SCNC: 27 MMOL/L (ref 20–32)
CREAT SERPL-MCNC: 0.85 MG/DL (ref 0.66–1.25)
ERYTHROCYTE [DISTWIDTH] IN BLOOD BY AUTOMATED COUNT: 13.2 % (ref 10–15)
FASTING STATUS PATIENT QL REPORTED: YES
GFR SERPL CREATININE-BSD FRML MDRD: 89 ML/MIN/1.73M2
GLUCOSE BLD-MCNC: 97 MG/DL (ref 70–99)
HBA1C MFR BLD: 5.4 % (ref 0–5.6)
HCT VFR BLD AUTO: 43 % (ref 40–53)
HDLC SERPL-MCNC: 40 MG/DL
HGB BLD-MCNC: 14.4 G/DL (ref 13.3–17.7)
LDLC SERPL CALC-MCNC: 46 MG/DL
MCH RBC QN AUTO: 30.7 PG (ref 26.5–33)
MCHC RBC AUTO-ENTMCNC: 33.5 G/DL (ref 31.5–36.5)
MCV RBC AUTO: 92 FL (ref 78–100)
NONHDLC SERPL-MCNC: 61 MG/DL
PLATELET # BLD AUTO: 171 10E3/UL (ref 150–450)
POTASSIUM BLD-SCNC: 4.5 MMOL/L (ref 3.4–5.3)
PROT SERPL-MCNC: 7.1 G/DL (ref 6.8–8.8)
RBC # BLD AUTO: 4.69 10E6/UL (ref 4.4–5.9)
SODIUM SERPL-SCNC: 142 MMOL/L (ref 133–144)
TRIGL SERPL-MCNC: 77 MG/DL
WBC # BLD AUTO: 5.8 10E3/UL (ref 4–11)

## 2021-12-22 PROCEDURE — 80053 COMPREHEN METABOLIC PANEL: CPT | Performed by: FAMILY MEDICINE

## 2021-12-22 PROCEDURE — 90662 IIV NO PRSV INCREASED AG IM: CPT | Performed by: FAMILY MEDICINE

## 2021-12-22 PROCEDURE — 83036 HEMOGLOBIN GLYCOSYLATED A1C: CPT | Performed by: FAMILY MEDICINE

## 2021-12-22 PROCEDURE — 36415 COLL VENOUS BLD VENIPUNCTURE: CPT | Performed by: FAMILY MEDICINE

## 2021-12-22 PROCEDURE — 0004A COVID-19,PF,PFIZER (12+ YRS): CPT | Performed by: FAMILY MEDICINE

## 2021-12-22 PROCEDURE — G0008 ADMIN INFLUENZA VIRUS VAC: HCPCS | Performed by: FAMILY MEDICINE

## 2021-12-22 PROCEDURE — 99397 PER PM REEVAL EST PAT 65+ YR: CPT | Mod: 25 | Performed by: FAMILY MEDICINE

## 2021-12-22 PROCEDURE — 99213 OFFICE O/P EST LOW 20 MIN: CPT | Mod: 25 | Performed by: FAMILY MEDICINE

## 2021-12-22 PROCEDURE — 80061 LIPID PANEL: CPT | Performed by: FAMILY MEDICINE

## 2021-12-22 PROCEDURE — 91300 COVID-19,PF,PFIZER (12+ YRS): CPT | Performed by: FAMILY MEDICINE

## 2021-12-22 PROCEDURE — 85027 COMPLETE CBC AUTOMATED: CPT | Performed by: FAMILY MEDICINE

## 2021-12-22 RX ORDER — LOSARTAN POTASSIUM 100 MG/1
100 TABLET ORAL DAILY
Qty: 30 TABLET | Refills: 0 | Status: SHIPPED | OUTPATIENT
Start: 2021-12-22 | End: 2021-12-24

## 2021-12-22 RX ORDER — MEMANTINE HYDROCHLORIDE 5 MG/1
5 TABLET ORAL DAILY
Qty: 30 TABLET | Refills: 0 | Status: SHIPPED | OUTPATIENT
Start: 2021-12-22 | End: 2022-01-24

## 2021-12-22 RX ORDER — DONEPEZIL HYDROCHLORIDE 5 MG/1
5 TABLET, FILM COATED ORAL AT BEDTIME
Qty: 30 TABLET | Refills: 2 | Status: SHIPPED | OUTPATIENT
Start: 2021-12-22 | End: 2021-12-22

## 2021-12-22 RX ORDER — ATORVASTATIN CALCIUM 20 MG/1
20 TABLET, FILM COATED ORAL DAILY
Qty: 30 TABLET | Refills: 0 | Status: SHIPPED | OUTPATIENT
Start: 2021-12-22 | End: 2021-12-24

## 2021-12-22 RX ORDER — AMLODIPINE BESYLATE 5 MG/1
5 TABLET ORAL DAILY
Qty: 30 TABLET | Refills: 0 | Status: SHIPPED | OUTPATIENT
Start: 2021-12-22 | End: 2021-12-24

## 2021-12-22 ASSESSMENT — PAIN SCALES - GENERAL: PAINLEVEL: NO PAIN (0)

## 2021-12-22 ASSESSMENT — MIFFLIN-ST. JEOR: SCORE: 1603.15

## 2021-12-22 NOTE — PROGRESS NOTES
"  Assessment & Plan   Problem List Items Addressed This Visit     Benign essential hypertension     Marginally elevated blood pressures . Recheck in 1-2 months. Continue current dos eof losartan and amlodipine         Relevant Medications    losartan (COZAAR) 100 MG tablet    amLODIPine (NORVASC) 5 MG tablet    Other Relevant Orders    Comprehensive metabolic panel (BMP + Alb, Alk Phos, ALT, AST, Total. Bili, TP)    CBC with platelets (Completed)    Early onset Alzheimer's dementia without behavioral disturbance (H)     He has tried aricept in the past but had side effects. Will try Namenda due to worsening memory         Relevant Medications    memantine (NAMENDA) 5 MG tablet    Spinal stenosis of lumbar region with neurogenic claudication     Significantly improved following spine surgery. stable         Hyperlipidemia LDL goal <130     Recheck lipid panel . Continue atorvastatin         Relevant Medications    atorvastatin (LIPITOR) 20 MG tablet      Other Visit Diagnoses     Medicare annual wellness visit, subsequent    -  Primary    Hyperlipidemia LDL goal <100        Relevant Medications    atorvastatin (LIPITOR) 20 MG tablet    Other Relevant Orders    Lipid panel reflex to direct LDL Fasting    High priority for 2019-nCoV vaccine        Relevant Orders    COVID-19,PF,PFIZER (12+ Yrs PURPLE LABEL) (Completed)    Hyperglycemia        Relevant Orders    Hemoglobin A1c (Completed)    Dementia without behavioral disturbance, unspecified dementia type (H)        Relevant Medications    memantine (NAMENDA) 5 MG tablet            BMI:   Estimated body mass index is 31.15 kg/m  as calculated from the following:    Height as of this encounter: 1.715 m (5' 7.52\").    Weight as of this encounter: 91.6 kg (202 lb).       See Patient Instructions    Return in about 6 months (around 6/22/2022).    Heike Antonio MD  [facility]ILEE Merchant is a 76 year old who presents for the following " "health issues     History of Present Illness       Hypertension: He presents for follow up of hypertension.  He does not check blood pressure  regularly outside of the clinic. Outside blood pressures have been over 140/90. He follows a low salt diet.     He eats 2-3 servings of fruits and vegetables daily.He consumes 1 sweetened beverage(s) daily.He exercises with enough effort to increase his heart rate 9 or less minutes per day.  He exercises with enough effort to increase his heart rate 4 days per week.   He is taking medications regularly.       Annual Wellness Visit    Are you in the first 12 months of your Medicare Part B coverage?  No    Physical Health:    In general, how would you rate your overall physical health? good    Outside of work, how many days during the week do you exercise?6-7 days/week    Outside of work, approximately how many minutes a day do you exercise?less than 15 minutes    If you drink alcohol do you typically have >3 drinks per day or >7 drinks per week? No    Do you usually eat at least 4 servings of fruit and vegetables a day, include whole grains & fiber and avoid regularly eating high fat or \"junk\" foods? NO    Do you have any problems taking medications regularly? No    Do you have any side effects from medications? none    Needs assistance for the following daily activities: no assistance needed    Which of the following safety concerns are present in your home?  none identified     Hearing impairment: No    In the past 6 months, have you been bothered by leaking of urine? no    Mental Health:    In general, how would you rate your overall mental or emotional health? good  PHQ-2 Score: 0    Do you feel safe in your environment? Yes    Have you ever done Advance Care Planning? (For example, a Health Directive, POLST, or a discussion with a medical provider or your loved ones about your wishes)? Yes, patient states has an Advance Care Planning document and will bring a copy to the " "clinic.    Fall risk:  Fallen 2 or more times in the past year?: (P) No  Any fall with injury in the past year?: (P) No  click delete button to remove this line now  Cognitive Screenin) Repeat 3 items (Leader, Season, Table)    2) Clock draw: ABNORMAL   3) 3 item recall: Recalls NO objects   Results: 0 items recalled: PROBABLE COGNITIVE IMPAIRMENT, **INFORM PROVIDER**    Mini-CogTM Copyright ALEXANDRE Wellington. Licensed by the author for use in [facility] IGG; reprinted with permission (julia@[facility]). All rights reserved.      Do you have sleep apnea, excessive snoring or daytime drowsiness?: no    Current providers sharing in care for this patient include:   Patient Care Team:  Heike Antonio MD as PCP - General (Family Practice)  Heike Antonio MD as Assigned PCP    Patient has been advised of split billing requirements and indicates understanding: Yes    Review of Systems   Constitutional, HEENT, cardiovascular, pulmonary, GI, , musculoskeletal, neuro, skin, endocrine and psych systems are negative, except as otherwise noted.      Objective    BP (!) 142/80   Pulse 73   Temp 97.6  F (36.4  C) (Oral)   Resp 18   Ht 1.715 m (5' 7.52\")   Wt 91.6 kg (202 lb)   SpO2 96%   BMI 31.15 kg/m    Body mass index is 31.15 kg/m .  Physical Exam   GENERAL: healthy, alert and no distress  EYES: Eyes grossly normal to inspection, PERRL and conjunctivae and sclerae normal  HENT: ear canals and TM's normal, nose and mouth without ulcers or lesions  NECK: no adenopathy, no asymmetry, masses, or scars and thyroid normal to palpation  RESP: lungs clear to auscultation - no rales, rhonchi or wheezes  CV: regular rate and rhythm, normal S1 S2, no S3 or S4, no murmur, click or rub, no peripheral edema and peripheral pulses strong  ABDOMEN: soft, nontender, no hepatosplenomegaly, no masses and bowel sounds normal  MS: no gross musculoskeletal defects noted, no edema  SKIN: no suspicious lesions or rashes  NEURO: Normal " strength and tone, mentation intact and speech normal  PSYCH: mentation appears normal, affect normal/bright    Results for orders placed or performed in visit on 12/22/21 (from the past 24 hour(s))   CBC with platelets   Result Value Ref Range    WBC Count 5.8 4.0 - 11.0 10e3/uL    RBC Count 4.69 4.40 - 5.90 10e6/uL    Hemoglobin 14.4 13.3 - 17.7 g/dL    Hematocrit 43.0 40.0 - 53.0 %    MCV 92 78 - 100 fL    MCH 30.7 26.5 - 33.0 pg    MCHC 33.5 31.5 - 36.5 g/dL    RDW 13.2 10.0 - 15.0 %    Platelet Count 171 150 - 450 10e3/uL   Hemoglobin A1c   Result Value Ref Range    Hemoglobin A1C 5.4 0.0 - 5.6 %

## 2021-12-22 NOTE — ASSESSMENT & PLAN NOTE
Marginally elevated blood pressures . Recheck in 1-2 months. Continue current dos eof losartan and amlodipine

## 2021-12-23 NOTE — RESULT ENCOUNTER NOTE
MrAmita Tegg    Your recent test results are attached. Essentially normal and stable labs.     If you have any questions or concerns please contact me via My Chart or call the clinic at [phone]     Thank You  Heike Antonio MD.

## 2021-12-23 NOTE — TELEPHONE ENCOUNTER
Pending Prescriptions:                       Disp   Refills    losartan (COZAAR) 100 MG tablet [Pharmacy *90 tab*         Sig: TAKE 1 TABLET(100 MG) BY MOUTH DAILY    amLODIPine (NORVASC) 5 MG tablet [Pharmacy*90 tab*         Sig: TAKE 1 TABLET(5 MG) BY MOUTH DAILY    atorvastatin (LIPITOR) 20 MG tablet [Pharm*90 tab*         Sig: TAKE 1 TABLET(20 MG) BY MOUTH DAILY    Routing refill request to provider for review/approval because:  30 days was sent by provider, patient is requesting 90 days

## 2021-12-24 RX ORDER — AMLODIPINE BESYLATE 5 MG/1
TABLET ORAL
Qty: 90 TABLET | Refills: 3 | Status: SHIPPED | OUTPATIENT
Start: 2021-12-24 | End: 2022-03-07

## 2021-12-24 RX ORDER — LOSARTAN POTASSIUM 100 MG/1
TABLET ORAL
Qty: 90 TABLET | Refills: 3 | Status: SHIPPED | OUTPATIENT
Start: 2021-12-24 | End: 2022-03-07

## 2021-12-24 RX ORDER — ATORVASTATIN CALCIUM 20 MG/1
TABLET, FILM COATED ORAL
Qty: 90 TABLET | Refills: 3 | Status: SHIPPED | OUTPATIENT
Start: 2021-12-24 | End: 2022-03-07

## 2022-01-24 DIAGNOSIS — F03.90 DEMENTIA WITHOUT BEHAVIORAL DISTURBANCE, UNSPECIFIED DEMENTIA TYPE: ICD-10-CM

## 2022-01-24 RX ORDER — MEMANTINE HYDROCHLORIDE 5 MG/1
TABLET ORAL
Qty: 30 TABLET | Refills: 5 | Status: SHIPPED | OUTPATIENT
Start: 2022-01-24 | End: 2022-03-07

## 2022-01-24 NOTE — TELEPHONE ENCOUNTER
Prescription approved per [facility] Refill Protocol.  MARIANA AlcarazN, RN, PHN  [place]  January 24, 2022

## 2022-03-07 ENCOUNTER — TELEPHONE (OUTPATIENT)
Dept: FAMILY MEDICINE | Facility: OTHER | Age: 77
End: 2022-03-07
Payer: COMMERCIAL

## 2022-03-07 DIAGNOSIS — I10 BENIGN ESSENTIAL HYPERTENSION: ICD-10-CM

## 2022-03-07 DIAGNOSIS — E78.5 HYPERLIPIDEMIA LDL GOAL <100: ICD-10-CM

## 2022-03-07 DIAGNOSIS — F03.90 DEMENTIA WITHOUT BEHAVIORAL DISTURBANCE, UNSPECIFIED DEMENTIA TYPE: ICD-10-CM

## 2022-03-07 RX ORDER — AMLODIPINE BESYLATE 5 MG/1
TABLET ORAL
Qty: 90 TABLET | Refills: 2 | Status: SHIPPED | OUTPATIENT
Start: 2022-03-07 | End: 2022-11-23

## 2022-03-07 RX ORDER — ATORVASTATIN CALCIUM 20 MG/1
20 TABLET, FILM COATED ORAL DAILY
Qty: 90 TABLET | Refills: 2 | Status: SHIPPED | OUTPATIENT
Start: 2022-03-07 | End: 2022-11-23

## 2022-03-07 RX ORDER — LOSARTAN POTASSIUM 100 MG/1
100 TABLET ORAL DAILY
Qty: 90 TABLET | Refills: 2 | Status: SHIPPED | OUTPATIENT
Start: 2022-03-07 | End: 2022-11-23

## 2022-03-07 RX ORDER — MEMANTINE HYDROCHLORIDE 5 MG/1
TABLET ORAL
Qty: 30 TABLET | Refills: 4 | Status: SHIPPED | OUTPATIENT
Start: 2022-03-07 | End: 2022-07-25

## 2022-03-07 NOTE — TELEPHONE ENCOUNTER
Son calls. States that prescriptions were accidentally sent to [facility] in [place] and should have gone to [facility]. He needs Amlodipine, atorvastatin, losartan, namenda all sent to [facility].       Remaining refills sent to [facility].    Jody He, MARIANAN, RN, PHN  Registered Nurse-Clinic Triage  [facility]  3/7/2022 at 12:10 PM

## 2022-06-29 ENCOUNTER — OFFICE VISIT (OUTPATIENT)
Dept: FAMILY MEDICINE | Facility: OTHER | Age: 77
End: 2022-06-29
Payer: COMMERCIAL

## 2022-06-29 VITALS
HEART RATE: 71 BPM | OXYGEN SATURATION: 94 % | TEMPERATURE: 97.4 F | DIASTOLIC BLOOD PRESSURE: 82 MMHG | SYSTOLIC BLOOD PRESSURE: 136 MMHG | BODY MASS INDEX: 30.75 KG/M2 | RESPIRATION RATE: 16 BRPM | WEIGHT: 199.4 LBS

## 2022-06-29 DIAGNOSIS — G30.0 EARLY ONSET ALZHEIMER'S DEMENTIA WITHOUT BEHAVIORAL DISTURBANCE (H): ICD-10-CM

## 2022-06-29 DIAGNOSIS — F02.80 EARLY ONSET ALZHEIMER'S DEMENTIA WITHOUT BEHAVIORAL DISTURBANCE (H): ICD-10-CM

## 2022-06-29 DIAGNOSIS — G89.29 CHRONIC LEFT SHOULDER PAIN: Primary | ICD-10-CM

## 2022-06-29 DIAGNOSIS — M25.512 CHRONIC LEFT SHOULDER PAIN: Primary | ICD-10-CM

## 2022-06-29 PROCEDURE — 99214 OFFICE O/P EST MOD 30 MIN: CPT | Performed by: STUDENT IN AN ORGANIZED HEALTH CARE EDUCATION/TRAINING PROGRAM

## 2022-06-29 ASSESSMENT — PAIN SCALES - GENERAL: PAINLEVEL: EXTREME PAIN (8)

## 2022-06-29 NOTE — LETTER
Office Visit on 12/22/2021   Component Date Value Ref Range Status     Cholesterol 12/22/2021 101  <200 mg/dL Final     Triglycerides 12/22/2021 77  <150 mg/dL Final     Direct Measure HDL 12/22/2021 40  >=40 mg/dL Final     LDL Cholesterol Calculated 12/22/2021 46  <=100 mg/dL Final     Non HDL Cholesterol 12/22/2021 61  <130 mg/dL Final     Patient Fasting > 8hrs? 12/22/2021 Yes   Final     Sodium 12/22/2021 142  133 - 144 mmol/L Final     Potassium 12/22/2021 4.5  3.4 - 5.3 mmol/L Final     Chloride 12/22/2021 110 (A) 94 - 109 mmol/L Final     Carbon Dioxide (CO2) 12/22/2021 27  20 - 32 mmol/L Final     Anion Gap 12/22/2021 5  3 - 14 mmol/L Final     Urea Nitrogen 12/22/2021 18  7 - 30 mg/dL Final     Creatinine 12/22/2021 0.85  0.66 - 1.25 mg/dL Final     Calcium 12/22/2021 9.2  8.5 - 10.1 mg/dL Final     Glucose 12/22/2021 97  70 - 99 mg/dL Final     Alkaline Phosphatase 12/22/2021 95  40 - 150 U/L Final     AST 12/22/2021 18  0 - 45 U/L Final     ALT 12/22/2021 29  0 - 70 U/L Final     Protein Total 12/22/2021 7.1  6.8 - 8.8 g/dL Final     Albumin 12/22/2021 3.8  3.4 - 5.0 g/dL Final     Bilirubin Total 12/22/2021 1.0  0.2 - 1.3 mg/dL Final     GFR Estimate 12/22/2021 89  >60 mL/min/1.73m2 Final    Effective December 21, 2021 eGFRcr in adults is calculated using the 2021 CKD-EPI creatinine equation which includes age and gender (Evelyne et al., NEJM, DOI: 10.1056/OHTTlq4645326)     WBC Count 12/22/2021 5.8  4.0 - 11.0 10e3/uL Final     RBC Count 12/22/2021 4.69  4.40 - 5.90 10e6/uL Final     Hemoglobin 12/22/2021 14.4  13.3 - 17.7 g/dL Final     Hematocrit 12/22/2021 43.0  40.0 - 53.0 % Final     MCV 12/22/2021 92  78 - 100 fL Final     MCH 12/22/2021 30.7  26.5 - 33.0 pg Final     MCHC 12/22/2021 33.5  31.5 - 36.5 g/dL Final     RDW 12/22/2021 13.2  10.0 - 15.0 % Final     Platelet Count 12/22/2021 171  150 - 450 10e3/uL Final     Hemoglobin A1C 12/22/2021 5.4  0.0 - 5.6 % Final    Normal <5.7%   Prediabetes  5.7-6.4%    Diabetes 6.5% or higher     Note: Adopted from ADA consensus guidelines.

## 2022-06-29 NOTE — PROGRESS NOTES
Assessment & Plan     Chronic left shoulder pain  Patient tripped and fell in his own home approximately 3 weeks ago landing on his left shoulder.  No other injury.  No loss of consciousness.  Complaining of left shoulder pain since.  He also thinks he has had some ongoing left shoulder pain from golfing.  Does have some decreased range of motion in intermittent pain.  States pain is doing actually well today.  No bony tenderness was identified today but he does have some tenderness over his left trapezius.  If patient has no fracture seen, would consider physical therapy moving forward, could even consider osteopathic manipulation as well.  I have low suspicion for fracture at this time.  If pain is not resolved after 6 weeks time frame for that situation would consider MRI/orthopedic referral.  Call primary phone on file for results (son)    - Physical Therapy Referral; Future  - XR Shoulder Left G/E 3 Views    Early onset Alzheimer's dementia without behavioral disturbance (H)  Follows up with PCP in regards to this.  Recent evaluation in December during Medicare visit.  Previously tried Aricept currently on Namenda    Follow-up as needed or if symptoms worsen or in 6 weeks if no resolution with pain with PT      I have spent 30 or more minutes face-to-face with the patient and coordinating care such as reviewing documentation, results, or having discussions over the phone.      CASIMIRO HOWELL MD  [facility]        Carlyn Merchant is a 76 year old, presenting for the following health issues:  Fall      History of Present Illness       Reason for visit:  Shoulder  Symptom onset:  3-4 weeks ago  Symptoms include:  Sharp pain left shoulder  Symptom intensity:  Moderate  Symptom progression:  Worsening  Had these symptoms before:  No  What makes it worse:  Lifting  What makes it better:  Advil    He eats 2-3 servings of fruits and vegetables daily.He consumes 2 sweetened beverage(s)  daily.He exercises with enough effort to increase his heart rate 9 or less minutes per day.  He exercises with enough effort to increase his heart rate 3 or less days per week. He is missing 3 dose(s) of medications per week.  He is not taking prescribed medications regularly due to remembering to take.       Review of Systems   Constitutional, HEENT, cardiovascular, pulmonary, gi and gu systems are negative, except as otherwise noted.      Objective    /82 (Cuff Size: Adult Regular)   Pulse 71   Temp 97.4  F (36.3  C) (Temporal)   Resp 16   Wt 90.4 kg (199 lb 6.4 oz)   SpO2 94%   BMI 30.75 kg/m    Body mass index is 30.75 kg/m .  Physical Exam  Vitals and nursing note reviewed.   Constitutional:       General: He is not in acute distress.     Appearance: Normal appearance. He is not ill-appearing, toxic-appearing or diaphoretic.   HENT:      Head: Normocephalic.      Right Ear: External ear normal.      Left Ear: External ear normal.      Nose: No rhinorrhea.   Eyes:      General:         Right eye: No discharge.         Left eye: No discharge.      Extraocular Movements: Extraocular movements intact.      Conjunctiva/sclera: Conjunctivae normal.      Pupils: Pupils are equal, round, and reactive to light.   Pulmonary:      Effort: Pulmonary effort is normal. No respiratory distress.   Musculoskeletal:         General: Normal range of motion.      Cervical back: Normal range of motion.      Comments: Range of motion slightly decreased of the left shoulder compared to the right, no pain to internal or external rotation.  Slight tenderness to palpation over the left trapezius over the superior aspect.  No bony tenderness over the clavicle.  Neurovascular intact distally over upper extremities   Neurological:      Mental Status: He is alert and oriented to person, place, and time.   Psychiatric:         Mood and Affect: Mood normal.         Behavior: Behavior normal.                .  ..

## 2022-07-01 ENCOUNTER — THERAPY VISIT (OUTPATIENT)
Dept: PHYSICAL THERAPY | Facility: CLINIC | Age: 77
End: 2022-07-01
Attending: STUDENT IN AN ORGANIZED HEALTH CARE EDUCATION/TRAINING PROGRAM
Payer: COMMERCIAL

## 2022-07-01 DIAGNOSIS — G89.29 CHRONIC LEFT SHOULDER PAIN: ICD-10-CM

## 2022-07-01 DIAGNOSIS — M25.512 ACUTE PAIN OF LEFT SHOULDER: ICD-10-CM

## 2022-07-01 DIAGNOSIS — M25.512 CHRONIC LEFT SHOULDER PAIN: ICD-10-CM

## 2022-07-01 PROCEDURE — 97110 THERAPEUTIC EXERCISES: CPT | Mod: GP | Performed by: PHYSICAL THERAPIST

## 2022-07-01 PROCEDURE — 97161 PT EVAL LOW COMPLEX 20 MIN: CPT | Mod: GP | Performed by: PHYSICAL THERAPIST

## 2022-07-01 NOTE — PROGRESS NOTES
[facility]    OUTPATIENT Physical Therapy ORTHOPEDIC EVALUATION  PLAN OF TREATMENT FOR OUTPATIENT REHABILITATION  (COMPLETE FOR INITIAL CLAIMS ONLY)  Patient's Last Name, First Name, M.I.  YOB: 1945  Tegg,Duane T    Provider s Name:  [facility]   Medical Record No.  [id number]   Start of Care Date:  07/01/22   Onset Date:   06/01/22   Type:     _X__PT   ___OT Medical Diagnosis:    Encounter Diagnoses   Name Primary?    Chronic left shoulder pain     Acute pain of left shoulder         Treatment Diagnosis:  L shoulder pain        Goals:     07/01/22 0500   Body Part   Goals listed below are for L shoulder   Goal #1   Goal #1 sleeping   Previous Functional Level No restrictions   Current Functional Level 1-2 hours without sleep per night   Performance Level pain 7/10 laying on left side   STG Target Performance 1-2 hours without sleep per night   Performance Level pain 3/10 laying on left side   Rationale to establish restorative sleep pattern   Due Date 07/29/22   LTG Target Performance Less than 1 hour without sleep per night   Performance level pain 2/10 sleeping on the left side   Rationale to establish restorative sleep pattern   Due Date 08/26/22       Therapy Frequency:  1x/week  Predicted Duration of Therapy Intervention:  8 weeks    Ashish Betts, PT                 I CERTIFY THE NEED FOR THESE SERVICES FURNISHED UNDER        THIS PLAN OF TREATMENT AND WHILE UNDER MY CARE     (Physician co-signature of this document indicates review and certification of the therapy plan).                     Certification Date From:  07/01/22   Certification Date To:  08/26/22    Referring Provider:  Bo Craft    Initial Assessment        See Epic Evaluation SOC Date: 07/01/22

## 2022-07-01 NOTE — PROGRESS NOTES
Physical Therapy Initial Evaluation  Subjective:  The history is provided by the patient (Son (Macy)). No  was used.   Patient Health History  Duane T Tegg being seen for Shoulder pain.     Problem began: 6/1/2022.   Problem occurred: Fall   Pain is reported as 7/10 on pain scale.  General health as reported by patient is good.  Pertinent medical history includes: high blood pressure and heart problems.   Red flags:  None as reported by patient.  Medical allergies: none.   Surgeries include:  None.    Current medications:  High blood pressure medication.    Current occupation is Retired.   Primary job tasks include:  Prolonged sitting.   Other job/home tasks details: Home tasks.                Therapist Generated HPI Evaluation  Problem details: Patient had a fall on 6/1/22 where he landed on the left shoulder. When he fell, patient reports that he landed directly on the lateral shoulder, not on an outstretched hand. Denies any pain in the shoulder prior to the fall. Currently he has 6/10 pain that is worsened by lifting objects such as cat food and when sleeping on the left shoulder. Pain is decreased by rest. Pain is located over the superior and lateral shoulder and can radiate all the way down to the left wrist. Pain has remained unchanged from the fall. He has tried topical creams and NSAIDS. Accompanied by son Macy..         Type of problem:  Left shoulder.    This is a new condition.  Condition occurred with:  A fall.  Where condition occurred: at home.  Patient reports pain:  Lateral (superior).  Pain is described as sharp and is constant.  Pain radiates to:  Wrist and elbow. Pain is worse in the A.M. and worse in the P.M..  Since onset symptoms are unchanged.  Associated symptoms:  Loss of motion/stiffness. Symptoms are exacerbated by lifting and lying on extremity  and relieved by ice, NSAID's and rest.  Special tests included:  X-ray (Calcific area head of humerus).    Work activity  restrictions: Retired.  Barriers include:  None as reported by patient.                        Objective:  System    Physical Exam    General     ROS Duane T Tegg , : 1945, MRN: [id number]    Physical Therapy Objective Findings  Subjective information, goals, clinical impression, daily documentation and other information found in EPISODES tab.  Shoulder Objective Findings  Posture Comments: Kyphotic shoulders  Screens:                                                                                            Right                                                        Left                          Cervical (ROM, quadrant) R rot 45,  ext 20 + pain, R SB 15    Cervical distraction mildly decreased patients arm symptoms L rot 30 + pain, L SB 10     - spurlings test while supine   Thoracic Mobility     TOS (andrew, Jonathanon, Liliana, Luther)              Range of Motion:                                             Right AROM          Right PROM            Left AROM             Left PROM           Flexion WNL  130 160 + pain   Abduction WNl  120 before adduction of shoulder    External Rotation WNL in 0 degrees  25 in 0 degrees    Internal Rotation L2  T12    Other:           Manual Muscle Testing (graded 0-5, measured at 0 degrees unless otherwise noted):                                                                                                  Right                                                    Left                             Flexion 5 4+   Abduction 5 4 (+)   External Rotation (at 0) 5 4+   Internal Rotation (at 0) 5 5   Extension     Mid Trap     Lower Trap     Other:     (+ mild pain, ++ moderate pain, +++ severe pain)    Special Tests:                                                                                             Right                                                    Left                             NEER'S  +   Vega/Laron  -   Coracoid  +   Bursa     Quinton's  -   Load and Shift       Relocation test     Sulcus test     Crossover     OTHER:       Joint Play                                                                                              Right                                                  Left                            Glenohumeral  Post mob moderate limitation w/pain  Inf WNL   ACJ  WNL   SCJ  Inf glide limited w/ pain     Palpation:  Tender over biceps tendon and distal supraspinatus Tender over distal deltoid.    Assessment/Plan:    Patient is a 76 year old male with left side shoulder complaints.    Patient has the following significant findings with corresponding treatment plan.                Diagnosis 1:  Left shoulder pain consistent with impingement/tendinopathy  Pain -  hot/cold therapy, electric stimulation, manual therapy, self management and home program  Decreased ROM/flexibility - manual therapy, therapeutic exercise, therapeutic activity and home program  Decreased strength - therapeutic exercise, therapeutic activities and home program  Impaired posture - neuro re-education, therapeutic activities and home program    Therapy Evaluation Codes:   1) History comprised of:   Personal factors that impact the plan of care:      Age and Cognition.    Comorbidity factors that impact the plan of care are:      Heart problems and High blood pressure.     Medications impacting care: High blood pressure.  2) Examination of Body Systems comprised of:   Body structures and functions that impact the plan of care:      Cervical spine and Shoulder.   Activity limitations that impact the plan of care are:      Dressing, Lifting and Sleeping.  3) Clinical presentation characteristics are:   Stable/Uncomplicated.  4) Decision-Making    Low complexity using standardized patient assessment instrument and/or measureable assessment of functional outcome.  Cumulative Therapy Evaluation is: Low complexity.    Previous and current functional limitations:  (See Goal Flow Sheet for this  information)    Short term and Long term goals: (See Goal Flow Sheet for this information)     Communication ability:  Patient appears to be able to clearly communicate and understand verbal and written communication and follow directions correctly.  Treatment Explanation - The following has been discussed with the patient:   RX ordered/plan of care  Anticipated outcomes  Possible risks and side effects  This patient would benefit from PT intervention to resume normal activities.   Rehab potential is good.    Frequency:  1 X week, once daily  Duration:  for 8 weeks  Discharge Plan:  Achieve all LTG.  Independent in home treatment program.  Reach maximal therapeutic benefit.    Please refer to the daily flowsheet for treatment today, total treatment time and time spent performing 1:1 timed codes.     Matteo Aranda SPT; Ashish Betts PT, OCS

## 2022-07-11 ENCOUNTER — THERAPY VISIT (OUTPATIENT)
Dept: PHYSICAL THERAPY | Facility: CLINIC | Age: 77
End: 2022-07-11
Payer: COMMERCIAL

## 2022-07-11 DIAGNOSIS — M25.512 LEFT SHOULDER PAIN: Primary | ICD-10-CM

## 2022-07-11 PROCEDURE — 97110 THERAPEUTIC EXERCISES: CPT | Mod: GP | Performed by: PHYSICAL THERAPY ASSISTANT

## 2022-07-11 NOTE — PROGRESS NOTES
PROGRESS  REPORT  Progress reporting period is from 7/1/22 to 7/11/22.      SUBJECTIVE  Subjective changes noted by patient:     Patient reports that the shoulder is doing better. He has really no pain in the shoulder. He has been able to complete his HEP independently and has returned to all activities.   Current Pain level: 0/10    Initial Pain level: 6/10 (increases to 7/10)   Changes in function:  Yes (See Goal flowsheet attached for changes in current functional level)     Adverse reaction to treatment or activity: None     OBJECTIVE  Changes noted in objective findings:   L shoulder flex 150 abd 160 ext rot 60; motion is equal to the R side   MMT L abd 4+/5 no pain flex 5/5 non tender biceps tendon and supraspinatus.   Spadi has improved from 71 to 2%.

## 2022-07-13 PROBLEM — M25.512 LEFT SHOULDER PAIN: Status: RESOLVED | Noted: 2022-07-01 | Resolved: 2022-07-13

## 2022-07-13 NOTE — PROGRESS NOTES
ASSESSMENT/PLAN  Updated problem list and treatment plan: Diagnosis 1:  L Shoulder Pain  Pain -  self management and home program  Decreased strength - home program  Decreased function - home program  STG/LTGs have been met:  Yes (See Goal flow sheet completed today.)  Assessment of Progress: The patient's condition is improving.  The patient has met all of their long term goals.  Self Management Plans:  Patient is independent in a home treatment program.  Patient is independent in self management of symptoms.  I have re-evaluated this patient and find that the nature, scope, duration and intensity of the therapy is appropriate for the medical condition of the patient.  Duane continues to require the following intervention to meet STG and LT's:  PT intervention is no longer required to meet STG/LTG.    Recommendations:  This patient is ready to be discharged from therapy and continue their home treatment program independently.  The progress note/discharge summary was written in collaboration with and reviewed by the physical therapist.    Please refer to the daily flowsheet for treatment today, total treatment time and time spent performing 1:1 timed codes.

## 2022-07-22 DIAGNOSIS — F03.90 DEMENTIA WITHOUT BEHAVIORAL DISTURBANCE, UNSPECIFIED DEMENTIA TYPE: ICD-10-CM

## 2022-07-25 RX ORDER — MEMANTINE HYDROCHLORIDE 5 MG/1
TABLET ORAL
Qty: 30 TABLET | Refills: 5 | Status: SHIPPED | OUTPATIENT
Start: 2022-07-25 | End: 2022-12-14

## 2022-11-21 DIAGNOSIS — E78.5 HYPERLIPIDEMIA LDL GOAL <100: ICD-10-CM

## 2022-11-21 DIAGNOSIS — I10 BENIGN ESSENTIAL HYPERTENSION: ICD-10-CM

## 2022-11-23 RX ORDER — ATORVASTATIN CALCIUM 20 MG/1
TABLET, FILM COATED ORAL
Qty: 90 TABLET | Refills: 0 | Status: SHIPPED | OUTPATIENT
Start: 2022-11-23 | End: 2022-12-14

## 2022-11-23 RX ORDER — LOSARTAN POTASSIUM 100 MG/1
100 TABLET ORAL DAILY
Qty: 90 TABLET | Refills: 0 | Status: SHIPPED | OUTPATIENT
Start: 2022-11-23

## 2022-11-23 RX ORDER — AMLODIPINE BESYLATE 5 MG/1
TABLET ORAL
Qty: 90 TABLET | Refills: 0 | Status: SHIPPED | OUTPATIENT
Start: 2022-11-23

## 2022-11-23 NOTE — TELEPHONE ENCOUNTER
Prescription approved per [facility] Refill Protocol. Patient due for labs next month

## 2022-12-14 ENCOUNTER — VIRTUAL VISIT (OUTPATIENT)
Dept: FAMILY MEDICINE | Facility: OTHER | Age: 77
End: 2022-12-14
Payer: COMMERCIAL

## 2022-12-14 DIAGNOSIS — K30 INDIGESTION: ICD-10-CM

## 2022-12-14 DIAGNOSIS — I10 BENIGN ESSENTIAL HYPERTENSION: Primary | ICD-10-CM

## 2022-12-14 DIAGNOSIS — F51.01 PRIMARY INSOMNIA: ICD-10-CM

## 2022-12-14 PROCEDURE — 99214 OFFICE O/P EST MOD 30 MIN: CPT | Mod: 95 | Performed by: STUDENT IN AN ORGANIZED HEALTH CARE EDUCATION/TRAINING PROGRAM

## 2022-12-14 RX ORDER — OMEPRAZOLE 20 MG/1
20 TABLET, DELAYED RELEASE ORAL DAILY
Qty: 90 TABLET | Refills: 0 | Status: SHIPPED | OUTPATIENT
Start: 2022-12-14

## 2022-12-14 RX ORDER — LANOLIN ALCOHOL/MO/W.PET/CERES
3 CREAM (GRAM) TOPICAL
Qty: 90 TABLET | Refills: 0 | Status: SHIPPED | OUTPATIENT
Start: 2022-12-14

## 2022-12-14 NOTE — PROGRESS NOTES
Mayur is a 77 year old who is being evaluated via a billable video visit.      How would you like to obtain your AVS? Mail a copy  If the video visit is dropped, the invitation should be resent by: Text to cell phone: [phone]  Will anyone else be joining your video visit? No          Assessment & Plan     Benign essential hypertension  Is variable at times, appears to be pretty stable though since being discontinued off his medications.  Assisted living will adjust as needed.    Primary insomnia  - melatonin 3 MG tablet; Take 1 tablet (3 mg) by mouth nightly as needed for sleep    Indigestion  - omeprazole (PRILOSEC OTC) 20 MG EC tablet; Take 1 tablet (20 mg) by mouth daily    Admission to assisted living facility  Faxed over information this evening, most recent H&P about 1 year ago, updated med list as well.  Orders to admit to facility.  Phone number to facility, [phone].  I also discussed things over with nursing, Sabra.  Her phone number is .        CASIMIRO HOWELL MD  [facility]   Mayur is a 77 year old accompanied by his son, presenting for the following health issues:  Recheck Medication and Orders        HPI     Son moved pt into indepentent living last Friday and they have demmed the pt unable to and are requesting he be moved into assisted living. [facility] in [place], Fax: [phone]. Nursing line: [phone] Needs signed medication list and orders to admit to assisted living with services.        Review of Systems   Constitutional, HEENT, cardiovascular, pulmonary, gi and gu systems are negative, except as otherwise noted.      Objective    Vitals - Patient Reported  Systolic (Patient Reported): (!) 153  Diastolic (Patient Reported): 82  Blood pressure taken with manual cuff (will exclude from quality measure):  (Unsure of mode taken)      Vitals:  No vitals were obtained today due to virtual visit.    Physical Exam    GENERAL: Healthy, alert and no distress  EYES: Eyes grossly normal to inspection.  No discharge or erythema, or obvious scleral/conjunctival abnormalities.  RESP: No audible wheeze, cough, or visible cyanosis.  No visible retractions or increased work of breathing.    SKIN: Visible skin clear. No significant rash, abnormal pigmentation or lesions.  NEURO: Cranial nerves grossly intact.  Mentation and speech appropriate for age.  PSYCH: Mentation appears normal, affect normal/bright, judgement and insight intact, normal speech and appearance well-groomed.            Video-Visit Details    Video Start Time: 5:55 PM    Type of service:  Video Visit    Video End Time:6:06 PM    Originating Location (pt. Location): Home        Distant Location (provider location):  On-site    Platform used for Video Visit: [employer]

## 2022-12-15 ENCOUNTER — TELEPHONE (OUTPATIENT)
Dept: FAMILY MEDICINE | Facility: OTHER | Age: 77
End: 2022-12-15

## 2022-12-15 NOTE — TELEPHONE ENCOUNTER
From my understanding the patient was going to be moving into the facility today. You can call the son about this or the facility. Let me know if anything else is needed. See my note from yesterday for any phone numbers that are needed      Thank you,    Bo Craft MD

## 2022-12-15 NOTE — TELEPHONE ENCOUNTER
Tried calling facility, nursing staff were gone when I called. Will call them tomorrow morning so see if patient moved in today (Thursday)      Dipti Juarez MA on 12/15/2022 at 5:16 PM

## 2022-12-15 NOTE — TELEPHONE ENCOUNTER
Provider Communication    Who is calling:  Sonja    Facility in which provider is associated:  [facility]         Reason for call:  Sonja states that the patient does not use this pharmacy and it is restricted to living ficility programs.    Sonja would like to know if the patient is going to be moving into a living ficillity     Medications in question:   melatonin 3 MG tablet   omeprazole (PRILOSEC OTC) 20 MG EC tablet         Okay to leave detailed message?:  Yes at Other phone number:  [phone]

## 2022-12-16 NOTE — TELEPHONE ENCOUNTER
Called and spoke to nurse at [facility], she has the orders along with both medications for patient.  As of now facility has what they need for patient.   Dipti Juarez MA on 12/16/2022 at 3:37 PM

## 2022-12-20 ENCOUNTER — TELEPHONE (OUTPATIENT)
Dept: FAMILY MEDICINE | Facility: OTHER | Age: 77
End: 2022-12-20

## 2022-12-20 DIAGNOSIS — K59.00 CONSTIPATION, UNSPECIFIED CONSTIPATION TYPE: Primary | ICD-10-CM

## 2022-12-20 DIAGNOSIS — K59.00 CONSTIPATION, UNSPECIFIED CONSTIPATION TYPE: ICD-10-CM

## 2023-03-07 NOTE — TELEPHONE ENCOUNTER
Donepezil (Aricept) 5 MG      Last Written Prescription Date: 1/5/17  Last Fill Quantity: 90,  # refills: 0   Last Office Visit with [facility], [facility] or [facility] prescribing provider: 12/26/16                                                Never smoker

## 2023-04-23 ENCOUNTER — HEALTH MAINTENANCE LETTER (OUTPATIENT)
Age: 78
End: 2023-04-23

## 2023-12-23 ENCOUNTER — HOSPITAL ENCOUNTER (EMERGENCY)
Facility: CLINIC | Age: 78
Discharge: HOME | End: 2023-12-24
Attending: FAMILY MEDICINE | Admitting: FAMILY MEDICINE
Payer: COMMERCIAL

## 2023-12-23 ENCOUNTER — APPOINTMENT (OUTPATIENT)
Dept: CT IMAGING | Facility: CLINIC | Age: 78
End: 2023-12-23
Attending: FAMILY MEDICINE
Payer: COMMERCIAL

## 2023-12-23 DIAGNOSIS — R32 URINARY INCONTINENCE, UNSPECIFIED TYPE: ICD-10-CM

## 2023-12-23 DIAGNOSIS — F03.90 DEMENTIA, UNSPECIFIED DEMENTIA SEVERITY, UNSPECIFIED DEMENTIA TYPE, UNSPECIFIED WHETHER BEHAVIORAL, PSYCHOTIC, OR MOOD DISTURBANCE OR ANXIETY (H): ICD-10-CM

## 2023-12-23 DIAGNOSIS — U07.1 COVID-19: ICD-10-CM

## 2023-12-23 DIAGNOSIS — R53.1 GENERALIZED WEAKNESS: ICD-10-CM

## 2023-12-23 LAB
ALBUMIN SERPL BCG-MCNC: 4 G/DL (ref 3.5–5.2)
ALBUMIN UR-MCNC: NEGATIVE MG/DL
ALP SERPL-CCNC: 116 U/L (ref 40–150)
ALT SERPL W P-5'-P-CCNC: 21 U/L (ref 0–70)
ANION GAP SERPL CALCULATED.3IONS-SCNC: 10 MMOL/L (ref 7–15)
APPEARANCE UR: CLEAR
AST SERPL W P-5'-P-CCNC: 23 U/L (ref 0–45)
BASOPHILS # BLD AUTO: 0 10E3/UL (ref 0–0.2)
BASOPHILS NFR BLD AUTO: 0 %
BILIRUB SERPL-MCNC: 0.7 MG/DL
BILIRUB UR QL STRIP: NEGATIVE
BUN SERPL-MCNC: 19.1 MG/DL (ref 8–23)
CALCIUM SERPL-MCNC: 8.6 MG/DL (ref 8.8–10.2)
CHLORIDE SERPL-SCNC: 102 MMOL/L (ref 98–107)
COLOR UR AUTO: YELLOW
CREAT SERPL-MCNC: 0.91 MG/DL (ref 0.67–1.17)
DEPRECATED HCO3 PLAS-SCNC: 24 MMOL/L (ref 22–29)
EGFRCR SERPLBLD CKD-EPI 2021: 85 ML/MIN/1.73M2
EOSINOPHIL # BLD AUTO: 0 10E3/UL (ref 0–0.7)
EOSINOPHIL NFR BLD AUTO: 0 %
ERYTHROCYTE [DISTWIDTH] IN BLOOD BY AUTOMATED COUNT: 12.9 % (ref 10–15)
GLUCOSE SERPL-MCNC: 95 MG/DL (ref 70–99)
GLUCOSE UR STRIP-MCNC: NEGATIVE MG/DL
HCT VFR BLD AUTO: 40.4 % (ref 40–53)
HGB BLD-MCNC: 13.8 G/DL (ref 13.3–17.7)
HGB UR QL STRIP: NEGATIVE
IMM GRANULOCYTES # BLD: 0 10E3/UL
IMM GRANULOCYTES NFR BLD: 0 %
KETONES UR STRIP-MCNC: 5 MG/DL
LACTATE SERPL-SCNC: 0.9 MMOL/L (ref 0.7–2)
LEUKOCYTE ESTERASE UR QL STRIP: NEGATIVE
LYMPHOCYTES # BLD AUTO: 0.8 10E3/UL (ref 0.8–5.3)
LYMPHOCYTES NFR BLD AUTO: 11 %
MCH RBC QN AUTO: 30.9 PG (ref 26.5–33)
MCHC RBC AUTO-ENTMCNC: 34.2 G/DL (ref 31.5–36.5)
MCV RBC AUTO: 90 FL (ref 78–100)
MONOCYTES # BLD AUTO: 1 10E3/UL (ref 0–1.3)
MONOCYTES NFR BLD AUTO: 14 %
MUCOUS THREADS #/AREA URNS LPF: PRESENT /LPF
NEUTROPHILS # BLD AUTO: 5.2 10E3/UL (ref 1.6–8.3)
NEUTROPHILS NFR BLD AUTO: 75 %
NITRATE UR QL: NEGATIVE
NRBC # BLD AUTO: 0 10E3/UL
NRBC BLD AUTO-RTO: 0 /100
PH UR STRIP: 7 [PH] (ref 5–7)
PLATELET # BLD AUTO: 160 10E3/UL (ref 150–450)
POTASSIUM SERPL-SCNC: 3.9 MMOL/L (ref 3.4–5.3)
PROT SERPL-MCNC: 6.8 G/DL (ref 6.4–8.3)
RBC # BLD AUTO: 4.47 10E6/UL (ref 4.4–5.9)
RBC URINE: 1 /HPF
SODIUM SERPL-SCNC: 136 MMOL/L (ref 135–145)
SP GR UR STRIP: 1.02 (ref 1–1.03)
SQUAMOUS EPITHELIAL: <1 /HPF
UROBILINOGEN UR STRIP-MCNC: 4 MG/DL
WBC # BLD AUTO: 7 10E3/UL (ref 4–11)
WBC URINE: 1 /HPF

## 2023-12-23 PROCEDURE — 81001 URINALYSIS AUTO W/SCOPE: CPT | Performed by: FAMILY MEDICINE

## 2023-12-23 PROCEDURE — 96361 HYDRATE IV INFUSION ADD-ON: CPT | Performed by: FAMILY MEDICINE

## 2023-12-23 PROCEDURE — 85025 COMPLETE CBC W/AUTO DIFF WBC: CPT | Performed by: FAMILY MEDICINE

## 2023-12-23 PROCEDURE — 80053 COMPREHEN METABOLIC PANEL: CPT | Performed by: FAMILY MEDICINE

## 2023-12-23 PROCEDURE — 36415 COLL VENOUS BLD VENIPUNCTURE: CPT | Performed by: FAMILY MEDICINE

## 2023-12-23 PROCEDURE — 83605 ASSAY OF LACTIC ACID: CPT | Performed by: FAMILY MEDICINE

## 2023-12-23 PROCEDURE — 99284 EMERGENCY DEPT VISIT MOD MDM: CPT | Mod: 25 | Performed by: FAMILY MEDICINE

## 2023-12-23 PROCEDURE — 258N000003 HC RX IP 258 OP 636: Performed by: FAMILY MEDICINE

## 2023-12-23 PROCEDURE — 70450 CT HEAD/BRAIN W/O DYE: CPT

## 2023-12-23 PROCEDURE — 96360 HYDRATION IV INFUSION INIT: CPT | Performed by: FAMILY MEDICINE

## 2023-12-23 PROCEDURE — 99284 EMERGENCY DEPT VISIT MOD MDM: CPT | Performed by: FAMILY MEDICINE

## 2023-12-23 RX ADMIN — SODIUM CHLORIDE 500 ML: 9 INJECTION, SOLUTION INTRAVENOUS at 22:37

## 2023-12-23 ASSESSMENT — ACTIVITIES OF DAILY LIVING (ADL): ADLS_ACUITY_SCORE: 35

## 2023-12-24 VITALS
OXYGEN SATURATION: 94 % | DIASTOLIC BLOOD PRESSURE: 82 MMHG | SYSTOLIC BLOOD PRESSURE: 174 MMHG | RESPIRATION RATE: 20 BRPM | HEART RATE: 99 BPM

## 2023-12-24 PROCEDURE — 96361 HYDRATE IV INFUSION ADD-ON: CPT | Performed by: FAMILY MEDICINE

## 2023-12-24 ASSESSMENT — ACTIVITIES OF DAILY LIVING (ADL): ADLS_ACUITY_SCORE: 35

## 2023-12-24 NOTE — DISCHARGE INSTRUCTIONS
Your lab work and CT scan were overall reassuring.  COVID can certainly cause generalized weakness.  Fortunately your lungs seem to be just fine.  Follow-up with your primary physician in the next 1-2 weeks.  You can discuss possible workup for normal pressure hydrocephalus.  It was a true pleasure visiting with you and your sons tonight.  I hope you feel better soon.  Thank you for being so patient with us during your ED stay.  We really do appreciate it. Marilee Garcia!    Thank you for choosing [facility]. We appreciate the opportunity to meet your urgent medical needs. Please let us know if we could have done anything to make your stay more satisfying.    After discharge, please closely monitor for any new or worsening symptoms. Return to the Emergency Department if you develop any acute worsening signs or symptoms.    If you had lab work, cultures or imaging studies done during your stay, the final results may still be pending. We will call you if your plan of care needs to change. However, if you are not improving as expected, please follow up with your primary care provider or clinic.     Start any prescription medications that were prescribed to you and take them as directed.     Please see additional handouts that may be pertinent to your condition.

## 2023-12-24 NOTE — ED PROVIDER NOTES
History     Chief Complaint   Patient presents with    Generalized Weakness    Covid Ktaina CUI  Duane T Tegg is a 78 year old male with Alzheimer's dementia is in a memory care unit in [facility].  He has fallen several times over the last month or so has never presented to the ED.  His sons were with him this morning and he seemed to be a little bit slower than usual when he was walking.  As the day progressed things got worse.  His weakness is now to the point where he is unable to walk on his own.  He was diagnosed with COVID this evening also has been incontinent of urine which is unusual for him.  States he has not had a bowel meant in 3 or 4 days.  No appetite tonight.  Denies any cough or shortness of breath.  Denies any pain other than in his right lower back.  Here with 2 of his sons.  Previously worked as an .    Allergies:  No Known Allergies    Problem List:    Patient Active Problem List    Diagnosis Date Noted    Spinal stenosis of lumbar region with neurogenic claudication 09/29/2018     Priority: Medium    Hyperlipidemia LDL goal <130 09/29/2018     Priority: Medium    Lumbago 08/10/2018     Priority: Medium    Early onset Alzheimer's dementia without behavioral disturbance (H) 12/26/2015     Priority: Medium    Benign essential hypertension 12/07/2015     Priority: Medium    Right bundle branch block 11/26/2015     Priority: Medium        Past Medical History:    History reviewed. No pertinent past medical history.    Past Surgical History:    Past Surgical History:   Procedure Laterality Date    COLONOSCOPY N/A 1/6/2016    Procedure: COMBINED COLONOSCOPY, SINGLE OR MULTIPLE BIOPSY/POLYPECTOMY BY BIOPSY;  Surgeon: Ryan Nicholas MD;  Location:  GI       Family History:    Family History   Problem Relation Age of Onset    Diabetes Father        Social History:  Marital Status:   [5]  Social History     Tobacco Use    Smoking status: Former     Packs/day: 0      Types: Cigarettes    Smokeless tobacco: Never   Vaping Use    Vaping Use: Never used   Substance Use Topics    Alcohol use: Yes    Drug use: No        Medications:    amLODIPine (NORVASC) 5 MG tablet  losartan (COZAAR) 100 MG tablet  magnesium hydroxide (MILK OF MAGNESIA) 400 MG/5ML suspension  melatonin 3 MG tablet  omeprazole (PRILOSEC OTC) 20 MG EC tablet  omeprazole (PRILOSEC) 20 MG DR capsule          Review of Systems   All other systems reviewed and are negative.      Physical Exam   BP: (!) 155/85  Pulse: 107  Resp: 23  SpO2: 94 %      Physical Exam  Constitutional:       General: He is not in acute distress.  HENT:      Head: Normocephalic and atraumatic.      Mouth/Throat:      Mouth: Mucous membranes are moist.      Pharynx: Oropharynx is clear.   Eyes:      Extraocular Movements: Extraocular movements intact.   Cardiovascular:      Rate and Rhythm: Normal rate and regular rhythm.   Pulmonary:      Effort: Pulmonary effort is normal.      Breath sounds: Normal breath sounds.   Abdominal:      Palpations: Abdomen is soft.      Tenderness: There is no abdominal tenderness. There is right CVA tenderness (mild).   Musculoskeletal:      Comments: Mild low back tenderness and mild bilateral CVA tenderness   Skin:     General: Skin is warm and dry.   Neurological:      Mental Status: He is alert. Mental status is at baseline. He is confused.      GCS: GCS eye subscore is 4. GCS verbal subscore is 4. GCS motor subscore is 6.      Cranial Nerves: Cranial nerves 2-12 are intact.      Sensory: Sensation is intact.      Motor: Weakness (generalized mild, but no focal weakness) present.      Coordination: Finger-Nose-Finger Test abnormal (able to touch his nose but past points my finger.  not sure if he understands the instructions).         ED Course                 Procedures              Critical Care time:  none               Results for orders placed or performed during the hospital encounter of 12/23/23 (from  the past 24 hour(s))   CBC with platelets differential    Narrative    The following orders were created for panel order CBC with platelets differential.  Procedure                               Abnormality         Status                     ---------                               -----------         ------                     CBC with platelets and d...[[id number]]                      Final result                 Please view results for these tests on the individual orders.   Comprehensive metabolic panel   Result Value Ref Range    Sodium 136 135 - 145 mmol/L    Potassium 3.9 3.4 - 5.3 mmol/L    Carbon Dioxide (CO2) 24 22 - 29 mmol/L    Anion Gap 10 7 - 15 mmol/L    Urea Nitrogen 19.1 8.0 - 23.0 mg/dL    Creatinine 0.91 0.67 - 1.17 mg/dL    GFR Estimate 85 >60 mL/min/1.73m2    Calcium 8.6 (L) 8.8 - 10.2 mg/dL    Chloride 102 98 - 107 mmol/L    Glucose 95 70 - 99 mg/dL    Alkaline Phosphatase 116 40 - 150 U/L    AST 23 0 - 45 U/L    ALT 21 0 - 70 U/L    Protein Total 6.8 6.4 - 8.3 g/dL    Albumin 4.0 3.5 - 5.2 g/dL    Bilirubin Total 0.7 <=1.2 mg/dL   Lactic acid whole blood   Result Value Ref Range    Lactic Acid 0.9 0.7 - 2.0 mmol/L   CBC with platelets and differential   Result Value Ref Range    WBC Count 7.0 4.0 - 11.0 10e3/uL    RBC Count 4.47 4.40 - 5.90 10e6/uL    Hemoglobin 13.8 13.3 - 17.7 g/dL    Hematocrit 40.4 40.0 - 53.0 %    MCV 90 78 - 100 fL    MCH 30.9 26.5 - 33.0 pg    MCHC 34.2 31.5 - 36.5 g/dL    RDW 12.9 10.0 - 15.0 %    Platelet Count 160 150 - 450 10e3/uL    % Neutrophils 75 %    % Lymphocytes 11 %    % Monocytes 14 %    % Eosinophils 0 %    % Basophils 0 %    % Immature Granulocytes 0 %    NRBCs per 100 WBC 0 <1 /100    Absolute Neutrophils 5.2 1.6 - 8.3 10e3/uL    Absolute Lymphocytes 0.8 0.8 - 5.3 10e3/uL    Absolute Monocytes 1.0 0.0 - 1.3 10e3/uL    Absolute Eosinophils 0.0 0.0 - 0.7 10e3/uL    Absolute Basophils 0.0 0.0 - 0.2 10e3/uL    Absolute Immature Granulocytes 0.0 <=0.4 10e3/uL     Absolute NRBCs 0.0 10e3/uL   CT Head w/o Contrast    Narrative    EXAM: CT HEAD W/O CONTRAST  LOCATION: [facility]  DATE: 12/23/2023    INDICATION: dementia, falls, generalized weakness  COMPARISON: MRI brain 08/30/2018, CT head 09/29/2018  TECHNIQUE: Routine CT Head without IV contrast. Multiplanar reformats. Dose reduction techniques were used.    FINDINGS:  INTRACRANIAL CONTENTS: No intracranial hemorrhage, extraaxial collection, or mass effect.  No CT evidence of acute infarct. Mild to moderate presumed chronic small vessel ischemic changes. Moderate to severe ventriculomegaly with ventricles enlarged   disproportionate to the sulcal atrophy. Ventricular size similar compared to CT head 09/29/2018. Findings may reflect normal pressure hydrocephalus, central brain atrophy, or extraventricular noncommunicating hydrocephalus. Please correlate clinically.    Retrocerebellar arachnoid cyst measuring 1.9 cm AP by 4.8 cm TR versus sonam cisterna magna. Findings similar to prior exam.    VISUALIZED ORBITS/SINUSES/MASTOIDS: No intraorbital abnormality. No paranasal sinus mucosal disease. No middle ear or mastoid effusion.    BONES/SOFT TISSUES: No acute abnormality. Vascular calcifications including within the left M1 segment.      Impression    IMPRESSION:  1.  No acute intracranial process.  2.  Chronic intracranial changes described above.   UA with Microscopic reflex to Culture    Specimen: Urine, Clean Catch   Result Value Ref Range    Color Urine Yellow Colorless, Straw, Light Yellow, Yellow    Appearance Urine Clear Clear    Glucose Urine Negative Negative mg/dL    Bilirubin Urine Negative Negative    Ketones Urine 5 (A) Negative mg/dL    Specific Gravity Urine 1.025 1.003 - 1.035    Blood Urine Negative Negative    pH Urine 7.0 5.0 - 7.0    Protein Albumin Urine Negative Negative mg/dL    Urobilinogen Urine 4.0 (A) Normal, 2.0 mg/dL    Nitrite Urine Negative Negative    Leukocyte  Esterase Urine Negative Negative    Mucus Urine Present (A) None Seen /LPF    RBC Urine 1 <=2 /HPF    WBC Urine 1 <=5 /HPF    Squamous Epithelials Urine <1 <=1 /HPF    Narrative    Urine Culture not indicated       Medications   sodium chloride 0.9% BOLUS 500 mL (0 mLs Intravenous Stopped 12/24/23 0123)       Assessments & Plan (with Medical Decision Making)  78-year-old diagnosed with COVID earlier today.  Has dementia and is in a memory care unit in [place].  Increasing weakness today and had an episode of incontinence.  Has fallen a few times over the last month or so.  Head CT was negative for any acute intracranial process.  No signs of trauma.  They did raise a question of possible normal pressure hydrocephalus.  This can be worked up further as an outpatient.  His urine was clear.  Labs are unremarkable.  He was given a small bolus of normal saline and perked up a bit.  He feels like he can make it back at the memory care unit and his sons agree.  He will follow-up with the primary physician in the next 1-2 weeks for reevaluation.  Suspect this is all due to his COVID infection.  He is doing fine from a respiratory standpoint.  Verbal and written discharge instructions given.  They are comfortable with this plan.     I have reviewed the nursing notes.    I have reviewed the findings, diagnosis, plan and need for follow up with the patient.           Medical Decision Making  The patient's presentation was of moderate complexity (an undiagnosed new problem with uncertain diagnosis).    The patient's evaluation involved:  an assessment requiring an independent historian (sons)  ordering and/or review of 3+ test(s) in this encounter (see separate area of note for details)    The patient's management necessitated moderate risk (limitations due to social determinants of health (see separate area of note for details)).        New Prescriptions    No medications on file       Final diagnoses:   COVID-19    Generalized weakness   Urinary incontinence, unspecified type   Dementia, unspecified dementia severity, unspecified dementia type, unspecified whether behavioral, psychotic, or mood disturbance or anxiety (H)       12/23/2023   [facility] EMERGENCY DEPT       Bennie Thrasher MD  12/24/23 0124

## 2023-12-24 NOTE — ED TRIAGE NOTES
"Pt comes from a memory care unit in [place]. He tested positive for covid this morning. He normally walks independently, but today was too weak to walk. Also has bladder incontinence that started today. His son want's him \"checked for a stroke\"        "

## 2024-06-30 ENCOUNTER — HEALTH MAINTENANCE LETTER (OUTPATIENT)
Age: 79
End: 2024-06-30

## 2024-07-31 ENCOUNTER — HOSPITAL ENCOUNTER (EMERGENCY)
Facility: CLINIC | Age: 79
Discharge: HOME | End: 2024-08-01
Attending: FAMILY MEDICINE
Payer: COMMERCIAL
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
## 2024-07-31 DIAGNOSIS — S00.83XA FOREHEAD CONTUSION, INITIAL ENCOUNTER: ICD-10-CM

## 2024-07-31 DIAGNOSIS — G30.0 EARLY ONSET ALZHEIMER'S DEMENTIA WITHOUT BEHAVIORAL DISTURBANCE (H): ICD-10-CM
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
## 2024-07-31 DIAGNOSIS — F02.80 EARLY ONSET ALZHEIMER'S DEMENTIA WITHOUT BEHAVIORAL DISTURBANCE (H): ICD-10-CM

## 2024-07-31 PROCEDURE — 250N000013 HC RX MED GY IP 250 OP 250 PS 637: Performed by: FAMILY MEDICINE

## 2024-07-31 PROCEDURE — 99283 EMERGENCY DEPT VISIT LOW MDM: CPT | Performed by: FAMILY MEDICINE

## 2024-07-31 PROCEDURE — 99284 EMERGENCY DEPT VISIT MOD MDM: CPT | Mod: 25 | Performed by: FAMILY MEDICINE

## 2024-07-31 RX ORDER — ACETAMINOPHEN 500 MG
1000 TABLET ORAL ONCE
Status: COMPLETED | OUTPATIENT
Start: 2024-07-31 | End: 2024-07-31

## 2024-07-31 RX ADMIN — ACETAMINOPHEN 1000 MG: 500 TABLET ORAL at 23:56

## 2024-07-31 ASSESSMENT — COLUMBIA-SUICIDE SEVERITY RATING SCALE - C-SSRS
2. HAVE YOU ACTUALLY HAD ANY THOUGHTS OF KILLING YOURSELF IN THE PAST MONTH?: NO
6. HAVE YOU EVER DONE ANYTHING, STARTED TO DO ANYTHING, OR PREPARED TO DO ANYTHING TO END YOUR LIFE?: NO
1. IN THE PAST MONTH, HAVE YOU WISHED YOU WERE DEAD OR WISHED YOU COULD GO TO SLEEP AND NOT WAKE UP?: NO

## 2024-08-01 ENCOUNTER — APPOINTMENT (OUTPATIENT)
Dept: CT IMAGING | Facility: CLINIC | Age: 79
End: 2024-08-01
Attending: FAMILY MEDICINE
Payer: COMMERCIAL

## 2024-08-01 ENCOUNTER — APPOINTMENT (OUTPATIENT)
Dept: GENERAL RADIOLOGY | Facility: CLINIC | Age: 79
End: 2024-08-01
Attending: FAMILY MEDICINE
Payer: COMMERCIAL

## 2024-08-01 VITALS
DIASTOLIC BLOOD PRESSURE: 71 MMHG | BODY MASS INDEX: 27.37 KG/M2 | SYSTOLIC BLOOD PRESSURE: 129 MMHG | WEIGHT: 180.6 LBS | TEMPERATURE: 98.4 F | HEIGHT: 68 IN | OXYGEN SATURATION: 96 % | RESPIRATION RATE: 19 BRPM | HEART RATE: 84 BPM

## 2024-08-01 PROCEDURE — 70450 CT HEAD/BRAIN W/O DYE: CPT

## 2024-08-01 PROCEDURE — 73030 X-RAY EXAM OF SHOULDER: CPT | Mod: LT

## 2024-08-01 PROCEDURE — 72125 CT NECK SPINE W/O DYE: CPT

## 2024-08-01 ASSESSMENT — ACTIVITIES OF DAILY LIVING (ADL): ADLS_ACUITY_SCORE: 38

## 2024-08-01 NOTE — ED NOTES
Pt able to ambulate out of ER at this time and not in distress at time of exit. Pt left in wheelchair with son at this time.  Son appeared to understand discharge instructions.

## 2024-08-01 NOTE — ED TRIAGE NOTES
Pt comes in today via EMS from [facility] in [place]. Apparently pt had an unwitnessed fall. Small contusion above right eye. Pt states that he tripped and that the staff saw the fall on camera. Only c/o pain is his left shoulder which is chronic. Hx Alzheimer's Disease. Per staff, pt ambulates independently around the unit.      Triage Assessment (Adult)       Row Name 07/31/24 [id number]          Triage Assessment    Airway WDL WDL        Respiratory WDL    Respiratory WDL WDL        Skin Circulation/Temperature WDL    Skin Circulation/Temperature WDL WDL        Cardiac WDL    Cardiac WDL WDL        Peripheral/Neurovascular WDL    Peripheral Neurovascular WDL WDL        Cognitive/Neuro/Behavioral WDL    Cognitive/Neuro/Behavioral WDL X     Level of Consciousness alert     Orientation time;disoriented to;place        Pupils (CN II)    Pupil PERRLA yes     Pupil Size Left 2 mm     Pupil Size Right 2 mm        Asha Coma Scale    Best Eye Response 4-->(E4) spontaneous     Best Motor Response 6-->(M6) obeys commands     Best Verbal Response 5-->(V5) oriented     Asha Coma Scale Score 15

## 2024-08-01 NOTE — ED PROVIDER NOTES
History     Chief Complaint   Patient presents with    Fall     HPI  Duane T Tegg is a 78 year old male who presented to the emergency room via ambulance from his assisted living home in [place], [place] secondary to desire to have him evaluated after a fall in the hallway.  The fall was not witnessed but the staff was able to review the fall on video camera recording.  It appeared the patient tripped while walking down the [place]ana.  They noted a area of contusion to the right forehead region.  Patient with history for Alzheimer's necessitating the need for the assisted living home.  Patient admits to some tenderness to his forehead although he pointed to the opposite side of the contusion.  He had no other complaints.  He does not remember the specifics of why he fell.  He did state that the floor is tile and it gets slippery at times.    Allergies:  No Known Allergies    Problem List:    Patient Active Problem List    Diagnosis Date Noted    Spinal stenosis of lumbar region with neurogenic claudication 09/29/2018     Priority: Medium    Hyperlipidemia LDL goal <130 09/29/2018     Priority: Medium    Lumbago 08/10/2018     Priority: Medium    Early onset Alzheimer's dementia without behavioral disturbance (H) 12/26/2015     Priority: Medium    Benign essential hypertension 12/07/2015     Priority: Medium    Right bundle branch block 11/26/2015     Priority: Medium        Past Medical History:    No past medical history on file.    Past Surgical History:    Past Surgical History:   Procedure Laterality Date    COLONOSCOPY N/A 1/6/2016    Procedure: COMBINED COLONOSCOPY, SINGLE OR MULTIPLE BIOPSY/POLYPECTOMY BY BIOPSY;  Surgeon: Ryan Nicholas MD;  Location:  GI       Family History:    Family History   Problem Relation Age of Onset    Diabetes Father        Social History:  Marital Status:   [5]  Social History     Tobacco Use    Smoking status: Former     Types: Cigarettes    Smokeless tobacco: Never  "  Vaping Use    Vaping status: Never Used   Substance Use Topics    Alcohol use: Yes    Drug use: No        Medications:    amLODIPine (NORVASC) 5 MG tablet  losartan (COZAAR) 100 MG tablet  magnesium hydroxide (MILK OF MAGNESIA) 400 MG/5ML suspension  melatonin 3 MG tablet  omeprazole (PRILOSEC OTC) 20 MG EC tablet  omeprazole (PRILOSEC) 20 MG DR capsule          Review of Systems   Unable to perform ROS: Dementia   All other systems reviewed and are negative.      Physical Exam   BP: 139/78  Pulse: 83  Temp: 98.6  F (37  C)  Resp: 16  Height: 172.7 cm (5' 8\")  Weight: 81.9 kg (180 lb 9.6 oz)  SpO2: 94 %      Physical Exam  Vitals and nursing note reviewed. Exam conducted with a chaperone present (Nino - nursing).   Constitutional:       General: He is not in acute distress.     Comments: Patient is alert but slow to answer questions.  The patient's son arrived to the exam room towards the end of my evaluation of the patient and he states that his father appears to be at his normal baseline state of mentation.  He stated that his father has fairly severe dementia and is slow to answer questions and usually is confused.   HENT:      Head: Contusion present. No raccoon eyes, Mendez's sign, abrasion or laceration.      Jaw: There is normal jaw occlusion.        Right Ear: No hemotympanum.      Left Ear: No hemotympanum.      Nose: No signs of injury.      Right Nostril: No epistaxis.      Left Nostril: No epistaxis.      Mouth/Throat:      Mouth: Mucous membranes are moist. No injury or oral lesions.   Eyes:      General: No scleral icterus.     Conjunctiva/sclera: Conjunctivae normal.      Pupils: Pupils are equal, round, and reactive to light.   Cardiovascular:      Rate and Rhythm: Normal rate.      Pulses: Normal pulses.   Pulmonary:      Effort: Pulmonary effort is normal. No respiratory distress.   Abdominal:      Tenderness: There is no abdominal tenderness.   Musculoskeletal:         General: Tenderness " (Patient with mild tenderness to the left shoulder with palpation.  I examined the patient from head to toe and he had no other areas of tenderness with palpation except to the left anterior shoulder.) present. No swelling or deformity.      Cervical back: Normal range of motion and neck supple.   Skin:     Capillary Refill: Capillary refill takes less than 2 seconds.      Comments: No open skin wounds noted.   Neurological:      Mental Status: He is alert.      Comments: Per the patient's son the patient's mentation is at baseline.         ED Course     ED Course as of 08/01/24 0103   Thu Aug 01, 2024   0052 Patient and his son were notified of the results of the left shoulder x-ray and head CT scan results.  Still awaiting the results from the radiologist and regarding to his neck CT.  Patient states that he is feeling better and has no specific complaints or concerns at this time.  They were noted about the calcific tendinitis in the left rotator cuff tendon and this likely a reason for his chronic pain in that shoulder.     Procedures              Critical Care time:  none               Results for orders placed or performed during the hospital encounter of 07/31/24 (from the past 24 hour(s))   XR Shoulder Left G/E 3 Views    Narrative    EXAM: XR SHOULDER LEFT G/E 3 VIEWS  LOCATION: [facility]  DATE: 8/1/2024    INDICATION: fall, pain  COMPARISON: None.      Impression    IMPRESSION: No fracture. Normal alignment. Small focus of calcification along the greater tuberosity suggestive of calcific rotator cuff tendinopathy.   CT Head w/o Contrast    Narrative    EXAM: CT HEAD W/O CONTRAST  LOCATION: [facility]  DATE: 8/1/2024    INDICATION: Fall, pain right forehead  COMPARISON: CT head 12/23/2023  TECHNIQUE: Routine CT Head without IV contrast. Multiplanar reformats. Dose reduction techniques were used.    FINDINGS:  INTRACRANIAL CONTENTS: No  intracranial hemorrhage, extraaxial collection, or mass effect.  No CT evidence of acute infarct. Mild to moderate presumed chronic small vessel ischemic changes. Moderate to severe ventriculomegaly with ventricles enlarged   disproportionate to the sulcal atrophy. Ventricular size similar in compared to prior exam. Findings may reflect normal pressure hydrocephalus, central brain atrophy, or extraventricular noncommunicating hydrocephalus. Please correlate clinically.    Retrocerebellar arachnoid cyst measuring 1.9 cm AP by 4.8 cm TR versus sonam cisterna magna. Findings similar to prior exam.    VISUALIZED ORBITS/SINUSES/MASTOIDS: No intraorbital abnormality. No paranasal sinus mucosal disease. No middle ear or mastoid effusion.    BONES/SOFT TISSUES: No acute abnormality.      Impression    IMPRESSION:  1.  No acute intracranial process.  2.  Chronic intracranial changes described above.   CT Cervical Spine w/o Contrast    Narrative    EXAM: CT CERVICAL SPINE W/O CONTRAST  LOCATION: [facility]  DATE: 8/1/2024    INDICATION: fall, pain  COMPARISON: None.  TECHNIQUE: Routine CT Cervical Spine without IV contrast. Multiplanar reformats. Dose reduction techniques were used.    FINDINGS:  VERTEBRA: No acute fracture.  No acute post traumatic subluxations.   Normal vertebral body heights. No prevertebral soft tissue swelling.    CANAL/FORAMINA: Multilevel spondylosis. Various levels and degrees of central canal stenosis.  C3-4, C4-5, C5-6, C6-7 severe central canal stenosis. Various levels and degrees of neural foraminal stenosis.  No significant subluxations.    PARASPINAL: Left parotid gland punctate calcifications. Left thyroid lobe 5 mm nodule.      Impression     IMPRESSION:  1.  No acute fracture.    2.  Degenerative changes described above.       Medications   acetaminophen (TYLENOL) tablet 1,000 mg (1,000 mg Oral $Given 7/31/24 5434)       Assessments & Plan (with Medical Decision  Making)  78-year-old male history for dementia to the ER today via ambulance from his assisted living home in [place], [place] secondary to a unwitnessed fall in the hallway.  Patient noted to have a contusion to his right forehead.  Patient's exam findings with evidence for contusion but no open wound present.  CT scan of head and neck were unremarkable for abnormality of acute nature.  Patient had some pain complaints with palpation to left shoulder on exam.  X-ray exam of the shoulder did not reveal any acute injury.  He was given some oral Tylenol which improved his pain.  Decision made to discharge patient back to assisted living home but he by his son.  Handouts discussing head injury were sent home with them.  Return the ER for any increase or worsening of symptoms.  Tylenol can be used every 6 hours as needed for pain.     I have reviewed the nursing notes.    I have reviewed the findings, diagnosis, plan and need for follow up with the patient's son.         New Prescriptions    No medications on file            I verbally discussed the findings of the evaluation today in the ER. I have verbally discussed with Duane the suggested treatment(s) as described in the discharge instructions and handouts.    I have verbally suggested he follow-up in his clinic or return to the ER for increased symptoms. See the follow-up recommendations documented  in the after visit summary in this visit's EPIC chart.      Disclaimer: This note consists of words and symbols derived from keyboarding and dictation using voice recognition software.  As a result, there may be errors that have gone undetected.  Please consider this when interpreting information found in this note.    Final diagnoses:   Fall at home, initial encounter   Early onset Alzheimer's dementia without behavioral disturbance (H)   Forehead contusion, initial encounter       7/31/2024   [facility] EMERGENCY DEPT       Ryan Benitez  Caleb,   08/01/24 0105

## 2024-08-01 NOTE — DISCHARGE INSTRUCTIONS
Please read and follow the handout(s) instructions. Return, if needed, for increased or worsening symptoms and as directed by the handout(s).    The CT scan of your neck was also found to be without signs of new injury.  You are found to have some arthritic change in the neck but this appears to be chronic and not a new injury tonight.